# Patient Record
Sex: FEMALE | Race: WHITE | Employment: OTHER | URBAN - METROPOLITAN AREA
[De-identification: names, ages, dates, MRNs, and addresses within clinical notes are randomized per-mention and may not be internally consistent; named-entity substitution may affect disease eponyms.]

---

## 2017-08-24 ENCOUNTER — HOSPITAL ENCOUNTER (OUTPATIENT)
Dept: MAMMOGRAPHY | Age: 67
Discharge: HOME | End: 2017-08-24
Attending: FAMILY MEDICINE
Payer: MEDICARE

## 2017-08-24 DIAGNOSIS — Z78.0 POSTMENOPAUSAL: ICD-10-CM

## 2017-08-24 PROCEDURE — 77080 DXA BONE DENSITY AXIAL: CPT

## 2018-02-21 ENCOUNTER — APPOINTMENT (OUTPATIENT)
Dept: CT IMAGING | Age: 68
End: 2018-02-21
Attending: EMERGENCY MEDICINE
Payer: MEDICARE

## 2018-02-21 ENCOUNTER — HOSPITAL ENCOUNTER (EMERGENCY)
Age: 68
Discharge: HOME | End: 2018-02-21
Attending: EMERGENCY MEDICINE
Payer: MEDICARE

## 2018-02-21 VITALS
BODY MASS INDEX: 46.95 KG/M2 | RESPIRATION RATE: 18 BRPM | HEART RATE: 57 BPM | WEIGHT: 265 LBS | OXYGEN SATURATION: 93 % | TEMPERATURE: 98.5 F | SYSTOLIC BLOOD PRESSURE: 166 MMHG | HEIGHT: 63 IN | DIASTOLIC BLOOD PRESSURE: 76 MMHG

## 2018-02-21 DIAGNOSIS — R10.31 ABDOMINAL PAIN, RIGHT LOWER QUADRANT: Primary | ICD-10-CM

## 2018-02-21 LAB
ALBUMIN SERPL-MCNC: 4.1 G/DL (ref 3.2–4.6)
ALBUMIN/GLOB SERPL: 1.2 {RATIO} (ref 1.2–3.5)
ALP SERPL-CCNC: 82 U/L (ref 50–136)
ALT SERPL-CCNC: 40 U/L (ref 12–65)
ANION GAP SERPL CALC-SCNC: 8 MMOL/L (ref 7–16)
AST SERPL-CCNC: 30 U/L (ref 15–37)
BASOPHILS # BLD: 0 K/UL (ref 0–0.2)
BASOPHILS NFR BLD: 1 % (ref 0–2)
BILIRUB SERPL-MCNC: 0.6 MG/DL (ref 0.2–1.1)
BUN SERPL-MCNC: 15 MG/DL (ref 8–23)
CALCIUM SERPL-MCNC: 9.1 MG/DL (ref 8.3–10.4)
CHLORIDE SERPL-SCNC: 100 MMOL/L (ref 98–107)
CO2 SERPL-SCNC: 30 MMOL/L (ref 21–32)
CREAT SERPL-MCNC: 1.14 MG/DL (ref 0.6–1)
DIFFERENTIAL METHOD BLD: NORMAL
EOSINOPHIL # BLD: 0.2 K/UL (ref 0–0.8)
EOSINOPHIL NFR BLD: 2 % (ref 0.5–7.8)
ERYTHROCYTE [DISTWIDTH] IN BLOOD BY AUTOMATED COUNT: 12.9 % (ref 11.9–14.6)
GLOBULIN SER CALC-MCNC: 3.3 G/DL (ref 2.3–3.5)
GLUCOSE SERPL-MCNC: 113 MG/DL (ref 65–100)
HCT VFR BLD AUTO: 43.4 % (ref 35.8–46.3)
HGB BLD-MCNC: 14.6 G/DL (ref 11.7–15.4)
IMM GRANULOCYTES # BLD: 0 K/UL (ref 0–0.5)
IMM GRANULOCYTES NFR BLD AUTO: 0 % (ref 0–5)
LYMPHOCYTES # BLD: 1.6 K/UL (ref 0.5–4.6)
LYMPHOCYTES NFR BLD: 22 % (ref 13–44)
MCH RBC QN AUTO: 31 PG (ref 26.1–32.9)
MCHC RBC AUTO-ENTMCNC: 33.6 G/DL (ref 31.4–35)
MCV RBC AUTO: 92.1 FL (ref 79.6–97.8)
MONOCYTES # BLD: 0.7 K/UL (ref 0.1–1.3)
MONOCYTES NFR BLD: 10 % (ref 4–12)
NEUTS SEG # BLD: 4.8 K/UL (ref 1.7–8.2)
NEUTS SEG NFR BLD: 65 % (ref 43–78)
PLATELET # BLD AUTO: 229 K/UL (ref 150–450)
PMV BLD AUTO: 11.1 FL (ref 10.8–14.1)
POTASSIUM SERPL-SCNC: 4 MMOL/L (ref 3.5–5.1)
PROT SERPL-MCNC: 7.4 G/DL (ref 6.3–8.2)
RBC # BLD AUTO: 4.71 M/UL (ref 4.05–5.25)
SODIUM SERPL-SCNC: 138 MMOL/L (ref 136–145)
WBC # BLD AUTO: 7.3 K/UL (ref 4.3–11.1)

## 2018-02-21 PROCEDURE — 96360 HYDRATION IV INFUSION INIT: CPT | Performed by: EMERGENCY MEDICINE

## 2018-02-21 PROCEDURE — 99283 EMERGENCY DEPT VISIT LOW MDM: CPT | Performed by: EMERGENCY MEDICINE

## 2018-02-21 PROCEDURE — 74011636320 HC RX REV CODE- 636/320: Performed by: EMERGENCY MEDICINE

## 2018-02-21 PROCEDURE — 74177 CT ABD & PELVIS W/CONTRAST: CPT

## 2018-02-21 PROCEDURE — 96361 HYDRATE IV INFUSION ADD-ON: CPT | Performed by: EMERGENCY MEDICINE

## 2018-02-21 PROCEDURE — 74011000258 HC RX REV CODE- 258: Performed by: EMERGENCY MEDICINE

## 2018-02-21 PROCEDURE — 80053 COMPREHEN METABOLIC PANEL: CPT | Performed by: EMERGENCY MEDICINE

## 2018-02-21 PROCEDURE — 99284 EMERGENCY DEPT VISIT MOD MDM: CPT | Performed by: EMERGENCY MEDICINE

## 2018-02-21 PROCEDURE — 74011250636 HC RX REV CODE- 250/636: Performed by: EMERGENCY MEDICINE

## 2018-02-21 PROCEDURE — 81003 URINALYSIS AUTO W/O SCOPE: CPT | Performed by: EMERGENCY MEDICINE

## 2018-02-21 PROCEDURE — 85025 COMPLETE CBC W/AUTO DIFF WBC: CPT | Performed by: EMERGENCY MEDICINE

## 2018-02-21 RX ORDER — SODIUM CHLORIDE 0.9 % (FLUSH) 0.9 %
10 SYRINGE (ML) INJECTION
Status: COMPLETED | OUTPATIENT
Start: 2018-02-21 | End: 2018-02-21

## 2018-02-21 RX ORDER — ONDANSETRON 4 MG/1
4 TABLET, ORALLY DISINTEGRATING ORAL
Qty: 15 TAB | Refills: 0 | Status: SHIPPED | OUTPATIENT
Start: 2018-02-21 | End: 2020-08-31

## 2018-02-21 RX ADMIN — IOPAMIDOL 100 ML: 755 INJECTION, SOLUTION INTRAVENOUS at 12:51

## 2018-02-21 RX ADMIN — SODIUM CHLORIDE 500 ML: 900 INJECTION, SOLUTION INTRAVENOUS at 11:37

## 2018-02-21 RX ADMIN — SODIUM CHLORIDE 100 ML: 900 INJECTION, SOLUTION INTRAVENOUS at 12:51

## 2018-02-21 RX ADMIN — Medication 10 ML: at 12:51

## 2018-02-21 NOTE — DISCHARGE INSTRUCTIONS
Abdominal Pain: Care Instructions  Your Care Instructions    Abdominal pain has many possible causes. Some aren't serious and get better on their own in a few days. Others need more testing and treatment. If your pain continues or gets worse, you need to be rechecked and may need more tests to find out what is wrong. You may need surgery to correct the problem. Don't ignore new symptoms, such as fever, nausea and vomiting, urination problems, pain that gets worse, and dizziness. These may be signs of a more serious problem. Your doctor may have recommended a follow-up visit in the next 8 to 12 hours. If you are not getting better, you may need more tests or treatment. The doctor has checked you carefully, but problems can develop later. If you notice any problems or new symptoms, get medical treatment right away. Follow-up care is a key part of your treatment and safety. Be sure to make and go to all appointments, and call your doctor if you are having problems. It's also a good idea to know your test results and keep a list of the medicines you take. How can you care for yourself at home? · Rest until you feel better. · To prevent dehydration, drink plenty of fluids, enough so that your urine is light yellow or clear like water. Choose water and other caffeine-free clear liquids until you feel better. If you have kidney, heart, or liver disease and have to limit fluids, talk with your doctor before you increase the amount of fluids you drink. · If your stomach is upset, eat mild foods, such as rice, dry toast or crackers, bananas, and applesauce. Try eating several small meals instead of two or three large ones. · Wait until 48 hours after all symptoms have gone away before you have spicy foods, alcohol, and drinks that contain caffeine. · Do not eat foods that are high in fat. · Avoid anti-inflammatory medicines such as aspirin, ibuprofen (Advil, Motrin), and naproxen (Aleve).  These can cause stomach upset. Talk to your doctor if you take daily aspirin for another health problem. When should you call for help? Call 911 anytime you think you may need emergency care. For example, call if:  ? · You passed out (lost consciousness). ? · You pass maroon or very bloody stools. ? · You vomit blood or what looks like coffee grounds. ? · You have new, severe belly pain. ?Call your doctor now or seek immediate medical care if:  ? · Your pain gets worse, especially if it becomes focused in one area of your belly. ? · You have a new or higher fever. ? · Your stools are black and look like tar, or they have streaks of blood. ? · You have unexpected vaginal bleeding. ? · You have symptoms of a urinary tract infection. These may include:  ¨ Pain when you urinate. ¨ Urinating more often than usual.  ¨ Blood in your urine. ? · You are dizzy or lightheaded, or you feel like you may faint. ? Watch closely for changes in your health, and be sure to contact your doctor if:  ? · You are not getting better after 1 day (24 hours). Where can you learn more? Go to http://rodolfo-elio.info/. Enter T860 in the search box to learn more about \"Abdominal Pain: Care Instructions. \"  Current as of: March 20, 2017  Content Version: 11.4  © 5697-6079 [employer]. Care instructions adapted under license by [employer] (which disclaims liability or warranty for this information). If you have questions about a medical condition or this instruction, always ask your healthcare professional. [place] [address] any warranty or liability for your use of this information.

## 2018-02-21 NOTE — ED PROVIDER NOTES
HPI:  28-year-old female here with right lower quadrant abdominal pain and the right flank pain that has been ongoing for the past 3 days. No blood in urine. Initially started right flank now radiated down to the right lower quadrant. Has been more consistent and constant. Nausea without vomiting. She thinks she may be constipated. Usually regular daily bowel movement however over the past 3-4 days has only had bowel movement when taken Dulcolax and they are very small. She denies any fever. No recent antibiotic, travel. No prior history of bowel obstructions. Has had total hysterectomy in the past.  Stated the sort of feels like a menstrual cycle. ROS  Constitutional: No fever, no chills  Skin: no rash  Eye: No vision changes  ENMT: No sore throat, no congestion  Respiratory: No shortness of breath, no cough  Cardiovascular: No chest pain, no palpitations  Gastrointestinal: No vomiting, + nausea, no diarrhea, + constipation, no rectal bleeding, + abdominal pain  : No dysuria, no hematuria  MSK: No back pain, no muscle pain, no joint pain  Neuro: No headache, no change in mental status, no numbness, no tingling, no weakness    All other review of systems positive per history of present illness and the above otherwise negative or noncontributory.     Visit Vitals    /84 (BP 1 Location: Right arm, BP Patient Position: At rest)    Pulse 79    Temp 98.4 °F (36.9 °C)    Resp 18    Ht 5' 3\" (1.6 m)    Wt 120.2 kg (265 lb)    SpO2 95%    BMI 46.94 kg/m2     Past Medical History:   Diagnosis Date    Acid reflux     Allergic rhinitis, cause unspecified 2/8/2013    Arthritis     OA- hips and knees    Back pain 2/8/2013    Basal cell carcinoma of cheek 2/8/2013    Elevated blood uric acid level 2/12/2015    Hyperlipidemia     Hypertension     Malaise and fatigue 2/8/2013    Morbid obesity ([address] 9/11/13    BMI 43.3    Personal history of anxiety disorder 2/12/2015    Sleep apnea 06/01/2015  Unspecified hypothyroidism 2015     Past Surgical History:   Procedure Laterality Date    HX BREAST REDUCTION  2013    BREAST REDUCTION performed by Sony Grace MD at [facility]    ganglion cyst removed right wrist     HX SUSAN AND BSO       Prior to Admission Medications   Prescriptions Last Dose Informant Patient Reported? Taking? FLUoxetine (PROZAC) 20 mg capsule   No No   Si-2 po Qam for mood   HYDROcodone-homatropine (HYCODAN) 5-1.5 mg/5 mL (5 mL) syrup   No No   Si-2 tsp po QHS for cough up to q6h ( causes sleepiness)   OTHER   No No   Sig: Knee High compression hose or socks  (20 - 30 mmHg if possible) Wear Daily Dx:   ( vericose veins, edema - R60.9)   Omeprazole delayed release (PRILOSEC D/R) 20 mg tablet   No No   Si po Qam for reflux   Syringe with Needle, Disp, (SYRINGE 3CC/25GX1\") 3 mL 25 gauge x 1\" syrg   No No   Sig: Use Every week - monthly for B12 injection   VITAMIN D2 50,000 unit capsule   No No   Sig: TAKE ONE CAPSULE BY MOUTH EVERY SEVEN DAYS   albuterol (PROAIR HFA) 90 mcg/actuation inhaler   No No   Sig: Take 2 Puffs by inhalation every six (6) hours as needed for Wheezing. allopurinol (ZYLOPRIM) 100 mg tablet   No No   Sig: Take 1 Tab by mouth daily. atorvastatin (LIPITOR) 20 mg tablet   No No   Si by mouth daily at bedtime for cholesterol   cpap machine kit   Yes No   Sig: by Does Not Apply route. cpap 13 cm   cyanocobalamin (VITAMIN B12) 1,000 mcg/mL injection   No No   Si ml IM q week for 8 weeks then 1 ml month  Dx: pernicious anemia   furosemide (LASIX) 20 mg tablet   No No   Si-2 po qam for edema   glucosamine-chondroitin (ARTHX) 500-400 mg cap   Yes No   Sig: Take 1 Cap by mouth daily. ipratropium (ATROVENT) 0.06 % nasal spray   No No   Si Sprays by Both Nostrils route four (4) times daily. levocetirizine (XYZAL) 5 mg tablet   No No   Sig: Take 1 Tab by mouth daily.  For allergies ( instead of zyrtec)  Dx: allergic rhinitis - failed zyrtec claritin on singulair and nasal steroid   levothyroxine (SYNTHROID) 88 mcg tablet   No No   Sig: Take 1 Tab by mouth Daily (before breakfast). lisinopril-hydroCHLOROthiazide (PRINZIDE, ZESTORETIC) 20-12.5 mg per tablet   No No   Sig: Take 1 Tab by mouth daily. meloxicam (MOBIC) 15 mg tablet   No No   Si po Qd for pain and inflammation after eating  Indications: hold for surgery- last dose 13   metFORMIN ER (GLUCOPHAGE XR) 500 mg tablet   No No   Si po every day at heaviest meal increasing to 2 po every day for diabetes   metoprolol succinate (TOPROL-XL) 100 mg tablet   No No   Sig: Take 1 Tab by mouth daily. Instead of metoprolol 100 mg   mometasone (NASONEX) 50 mcg/actuation nasal spray   No No   Si Sprays by Both Nostrils route daily. montelukast (SINGULAIR) 10 mg tablet   No No   Sig: Take 1 Tab by mouth daily. For allergies with zyrtec   omeprazole (PRILOSEC) 20 mg capsule   Yes No   Sig: TAKE ONE CAPSULE BY MOUTH IN THE MORNING FOR REFLUX   potassium chloride (KLOR-CON) 10 mEq tablet   No No   Si po every day for potassium with furosemide   potassium chloride SR (KLOR-CON 10) 10 mEq tablet   Yes No   Sig: TAKE ONE TABLET BY MOUTH EVERY DAY. TAKE WITH FUROSEMIDE. Facility-Administered Medications: None         Adult Exam   General: alert, no acute distress  Head: normocephalic, atraumatic  ENT: moist mucous membranes  Neck: supple, non-tender; full range of motion  Cardiovascular: regular rate and rhythm, normal peripheral perfusion, no edema  Respiratory: lungs are clear to auscultation; normal respirations; no wheezing, rales or rhonchi  Gastrointestinal: soft, focal tenderness to right lower quadrant.; no pain at the right upper left lower, left upper quadrant.   No CVA tenderness on percussion no rebound or guarding, no peritoneal signs, no distension  Back: non-tender, full range of motion  Musculoskeletal: normal range of motion, normal strength, no gross deformities  Neurological: alert and oriented x 4, no gross focal deficits; normal speech  Psychiatric: cooperative; appropriate mood and affect    MDM:urine without signs of blood, infection. Labs unremarkable but focal tenderness consistently for 3 days. We'll obtain CT for evaluation of colitis, enteritis, inflammatory changes. I have a lower suspicion for appendicitis at this time given the overall clinical picture. Low suspicion for kidney stone. Do not suspect dissection or obstruction    Recent Results (from the past 12 hour(s))   CBC WITH AUTOMATED DIFF    Collection Time: 02/21/18 10:28 AM   Result Value Ref Range    WBC 7.3 4.3 - 11.1 K/uL    RBC 4.71 4.05 - 5.25 M/uL    HGB 14.6 11.7 - 15.4 g/dL    HCT 43.4 35.8 - 46.3 %    MCV 92.1 79.6 - 97.8 FL    MCH 31.0 26.1 - 32.9 PG    MCHC 33.6 31.4 - 35.0 g/dL    RDW 12.9 11.9 - 14.6 %    PLATELET 741 525 - 189 K/uL    MPV 11.1 10.8 - 14.1 FL    DF AUTOMATED      NEUTROPHILS 65 43 - 78 %    LYMPHOCYTES 22 13 - 44 %    MONOCYTES 10 4.0 - 12.0 %    EOSINOPHILS 2 0.5 - 7.8 %    BASOPHILS 1 0.0 - 2.0 %    IMMATURE GRANULOCYTES 0 0.0 - 5.0 %    ABS. NEUTROPHILS 4.8 1.7 - 8.2 K/UL    ABS. LYMPHOCYTES 1.6 0.5 - 4.6 K/UL    ABS. MONOCYTES 0.7 0.1 - 1.3 K/UL    ABS. EOSINOPHILS 0.2 0.0 - 0.8 K/UL    ABS. BASOPHILS 0.0 0.0 - 0.2 K/UL    ABS. IMM.  GRANS. 0.0 0.0 - 0.5 K/UL   METABOLIC PANEL, COMPREHENSIVE    Collection Time: 02/21/18 10:28 AM   Result Value Ref Range    Sodium 138 136 - 145 mmol/L    Potassium 4.0 3.5 - 5.1 mmol/L    Chloride 100 98 - 107 mmol/L    CO2 30 21 - 32 mmol/L    Anion gap 8 7 - 16 mmol/L    Glucose 113 (H) 65 - 100 mg/dL    BUN 15 8 - 23 MG/DL    Creatinine 1.14 (H) 0.6 - 1.0 MG/DL    GFR est AA >60 >60 ml/min/1.73m2    GFR est non-AA 51 (L) >60 ml/min/1.73m2    Calcium 9.1 8.3 - 10.4 MG/DL    Bilirubin, total 0.6 0.2 - 1.1 MG/DL    ALT (SGPT) 40 12 - 65 U/L    AST (SGOT) 30 15 - 37 U/L Alk. phosphatase 82 50 - 136 U/L    Protein, total 7.4 6.3 - 8.2 g/dL    Albumin 4.1 3.2 - 4.6 g/dL    Globulin 3.3 2.3 - 3.5 g/dL    A-G Ratio 1.2 1.2 - 3.5         Ct Abd Pelv W Cont    Result Date: 2/21/2018  CT of the Abdomen with Contrast and CT of the Pelvis with Contrast 2/21/2018 12:59 PM Indication: Right lower quadrant pain, nausea x3 days Comparison: None available at this hospital PACS Technique:  Multiple contiguous axial images encompassing the abdomen and pelvis are obtained following the administration of approximately 100cc of Isovue 370 nonionic intravenous contrast by power injector. No oral contrast is administered. For this CT scanner at least one of the following techniques is utilized to decrease patient radiation dose: Automatic exposure control, KVP and mA modulation based on patient weight, and iterative reconstruction. IV contrast is given to better delineate vascular structures and increase sensitivity of lesions in the solid organs. CT abdomen-mild elevation of the right hemidiaphragm. There may be hepatic steatosis but otherwise only a small cyst is suggested in the dome of the liver. Gallbladder, biliary tree and pancreas are not remarkable. Kidneys are symmetric with only some renal and parapelvic cysts suggested. Stomach, duodenum, spleen and adrenals are unremarkable. No acute vascular lesion. No enlarged adenopathy. Unopacified small bowel loops and colon are nondistended with no obvious inflammation. CT pelvis-uterus absent, no adnexal mass. Bladder appearance unremarkable. Mild prominence of fat in the left inguinal canal. Small fat-containing umbilical hernia only. Pelvic small bowel loops and the rectosigmoid colon are nondistended with no obvious inflammation. No acute vascular lesion or lymphadenopathy. IMPRESSION: Nothing acute identified in the abdomen and pelvis. Some chronic changes-possible hepatic steatosis and hepatic and renal cysts.  There has been hysterectomy. Normal size appendix in the right lower quadrant. UA negative. Nonspecific hepato-and renal cysts. No acute intra-abdominal surgical pathology identified. Patient is well-appearing at this time. Uterus and ovaries medicine secondary to total hysterectomy. Discharge home with Zofran. Recommend MiraLAX, Colace for suspected constipation. Return precautions given. No further questions or concern. Dragon voice recognition software was used to create this note. Although the note has been reviewed and corrected where necessary, additional errors may have been overlooked and remain in the text.

## 2018-02-21 NOTE — ED TRIAGE NOTES
C/o constant lower abdominal pain radiating around to bilateral flank and lower back. States intermittent episodes of worsening. Onset Monday. Denies n/v/d. Denies fever. Attempted laxative Monday and yesterday without relief. Denies hx chronic constipation however states no regular bms this weekend. Denies urinary symptoms. Denies hx kidney stones. Denies attempting pain meds.

## 2021-12-06 PROBLEM — E11.9 TYPE 2 DIABETES MELLITUS (HCC): Status: ACTIVE | Noted: 2021-12-06

## 2022-01-06 PROBLEM — N39.41 URGE INCONTINENCE OF URINE: Status: ACTIVE | Noted: 2022-01-06

## 2022-01-06 PROBLEM — N39.3 SUI (STRESS URINARY INCONTINENCE, FEMALE): Status: ACTIVE | Noted: 2022-01-06

## 2022-03-18 PROBLEM — N39.3 SUI (STRESS URINARY INCONTINENCE, FEMALE): Status: ACTIVE | Noted: 2022-01-06

## 2022-03-18 PROBLEM — E11.9 TYPE 2 DIABETES MELLITUS (HCC): Status: ACTIVE | Noted: 2021-12-06

## 2022-03-19 PROBLEM — N39.41 URGE INCONTINENCE OF URINE: Status: ACTIVE | Noted: 2022-01-06

## 2022-03-30 ENCOUNTER — HOSPITAL ENCOUNTER (OUTPATIENT)
Dept: PHYSICAL THERAPY | Age: 72
Discharge: HOME | End: 2022-03-30
Attending: OBSTETRICS & GYNECOLOGY
Payer: MEDICARE

## 2022-03-30 DIAGNOSIS — E66.01 MORBID OBESITY WITH BMI OF 45.0-49.9, ADULT (HCC): ICD-10-CM

## 2022-03-30 DIAGNOSIS — N39.41 URGE INCONTINENCE OF URINE: ICD-10-CM

## 2022-03-30 DIAGNOSIS — N39.3 SUI (STRESS URINARY INCONTINENCE, FEMALE): ICD-10-CM

## 2022-03-30 PROCEDURE — 97110 THERAPEUTIC EXERCISES: CPT

## 2022-03-30 PROCEDURE — 97161 PT EVAL LOW COMPLEX 20 MIN: CPT
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
## 2022-03-30 NOTE — PROGRESS NOTES
Alexi Lutz  : 1950  [place]  Secondary: [address] [facility] [address], [place] [address]  [phone]   [phone]      OUTPATIENT PHYSICAL THERAPY: Daily Treatment Note 3/30/2022   Visit Count:  1  ICD-10: Treatment Diagnosis: Lack of coordination (muscle incoordination) (R27.8), Generalized weakness (M62.81), Pelvic Muscle Wasting (N81.84), Mixed incontinence (Urge and stress incontinence) (N39.46) and Nocturia (R35.1)  Precautions/Allergies:   Patient has no known allergies. TREATMENT PLAN:  Effective Dates: 3/30/2022 TO 2022 (90 days). Frequency/Duration: 1 time a week for 90 Day(s) MEDICAL/REFERRING DIAGNOSIS:  Urge incontinence of urine [N39.41]  PERCY (stress urinary incontinence, female) [N39.3]  Morbid obesity with BMI of 45.0-49.9, adult ([facility] [E66.01, Z68.42]   DATE OF ONSET: chronic   REFERRING PHYSICIAN: Kwame Cisneros DO MD Orders: Evaluate and treat  Return MD Appointment: not scheduled; f/u after PT     Pre-treatment Symptoms/Complaints:  PERCY, UUI  Pain: Initial: Pain Intensity 1: 0  Post Session:  0/10   Medications Last Reviewed:  3/30/2022  Updated Objective Findings:  See evaluation note from today   TREATMENT:   THERAPEUTIC EXERCISE: (10 minutes):  Exercises per grid below to improve mobility, strength and coordination. Required maximal visual, verbal and tactile cues to promote proper body breathing techniques and activation of PFM. Progressed range and repetitions as indicated.   TE= therapeutic exercise, NE= neuro re-education   Date:  3/30/22 Date:   Date:     Activity/Exercise Parameters Parameters Parameters   PFM coordination Isolation of contraction      HEP kegel x10; 3x/day             THERAPEUTIC ACTIVITY: ( 10 minutes): Functional activity education regarding anatomy, pathology and role of pelvic floor muscle (PFM) function in relation to presenting symptoms and role of pelvic floor therapy in conservative treatment. TA Educational Topic Notes Date Completed   Pathology/Anatomy/PFM Function  3/30/22   Bladder health education     Urinary urge suppression     The knack     Voiding strategies     Bowel/Bladder log     Bowel health education     Constipation care     Diarrhea/Fecal leakage     Colonic massage     Toilet positioning     Defecation dynamics     Sources of fiber     Return to intercourse/Dilator training     Sexual positioning     Lubricants/vaginal moisturizers     Vaginal irritants     Body mechanics     Posture/ergonomics     Diaphragmatic breathing     Resources and technology       Pt gives verbal consent to internal vaginal assessment/treatment without chaperon present. Treatment/Session Summary:    Response to Treatment:  Pt reports good understanding of plan of care, as well as prescribed home exercise program.  All questions were answered to pt's satisfaction. Pt was invited to call with any further questions or concerns.   Communication/Consultation:  None today  Equipment provided today:  None today  Recommendations/Intent for next treatment session: Next visit will focus on progression of kegel, functional use of breath, bladder health, Healthy Lifestyles Program.  Variation from POC: N/A  Total Treatment Billable Duration: Evaluation 35 minutes, treatment 20 minutes (10 minutes TE, 10 minutes TA)  PT Patient Time In/Time Out  Time In: 1400  Time Out: [address] [facility] [facility] [address], PT, DPT     Future Appointments   Date Time Provider Ryan Hutchinson   4/6/2022  3:00 PM Chandrakant Rosenberg PT DPT [facility]   4/13/2022  1:00 PM Korina Dorado PT, DPT [facility]   4/20/2022  1:00 PM Korina Dorado PT, DPT [facility]   4/27/2022 11:00 AM Korina Dorado PT, DPT [facility]   5/11/2022  1:00 PM Chandrakant Rosenberg PT, DPT [facility]   5/25/2022  2:00 PM Chandrakant Rosenberg PT, DPT [facility]   6/8/2022  1:15 PM Nix, Meghan Up, PT, DPT [facility]   6/20/2022  1:00 PM Lena Garcia PT, DPT [facility]   9/6/2022  9:30 AM Phoebe Adam MD [facility] FPA FPA   11/8/2022  9:20 AM Clarice Madrid MD [facility] PSCD PP

## 2022-04-06 ENCOUNTER — HOSPITAL ENCOUNTER (OUTPATIENT)
Dept: PHYSICAL THERAPY | Age: 72
Discharge: HOME | End: 2022-04-06
Attending: OBSTETRICS & GYNECOLOGY
Payer: MEDICARE

## 2022-04-06 PROCEDURE — 97530 THERAPEUTIC ACTIVITIES: CPT

## 2022-04-06 PROCEDURE — 97110 THERAPEUTIC EXERCISES: CPT

## 2022-04-06 NOTE — PROGRESS NOTES
Edilma Credit  : 1950  [place]  Secondary: [address] at [facility]  [address], [address], [place]  [phone]   [phone]      OUTPATIENT PHYSICAL THERAPY: Daily Treatment Note 2022   Visit Count:  2  ICD-10: Treatment Diagnosis: Lack of coordination (muscle incoordination) (R27.8), Generalized weakness (M62.81), Pelvic Muscle Wasting (N81.84), Mixed incontinence (Urge and stress incontinence) (N39.46) and Nocturia (R35.1)  Precautions/Allergies:   Patient has no known allergies. TREATMENT PLAN:  Effective Dates: 3/30/2022 TO 2022 (90 days). Frequency/Duration: 1 time a week for 90 Day(s) MEDICAL/REFERRING DIAGNOSIS:  Urge incontinence of urine [N39.41]  PERCY (stress urinary incontinence, female) [N39.3]  Morbid obesity with BMI of 45.0-49.9, adult ([facility] [E66.01, Z68.42]   DATE OF ONSET: chronic   REFERRING PHYSICIAN: Lorene Vargas DO MD Orders: Evaluate and treat  Return MD Appointment: not scheduled; f/u after PT     Pre-treatment Symptoms/Complaints:  PERCY, UUI    Pt reports doing well with exercises. She is doing about 5 sets of kegels per day. Still challenged with isolation of contraction. Nothing new to report. Pain: Initial: Pain Intensity 1: 0  Post Session:  0/10   Medications Last Reviewed:  2022  Updated Objective Findings:  unable to activation PFM with bulging present with increased in IAP   TREATMENT:   THERAPEUTIC EXERCISE: (30 minutes):  Exercises per grid below to improve mobility, strength and coordination. Required maximal visual, verbal and tactile cues to promote proper body breathing techniques and activation of PFM. Progressed range and repetitions as indicated.   TE= therapeutic exercise, NE= neuro re-education   Date:  3/30/22 Date:  22 Date:     Activity/Exercise Parameters Parameters Parameters   PFM coordination Isolation of contraction Isolation of contraction w/ digital palpation; max verbal, tactile cuing to improve activation and minimize bulging  Breathing strategies to improve activation    HEP kegel x10; 3x/day Continue with isolation of PFM            THERAPEUTIC ACTIVITY: ( 25 minutes): Functional activity education on avoiding bladder irritants, substitutions for common irritants, recommended fluid intake (types and spacing), appropriate voiding frequency, weight management and overall contributing factors of bowel health on bladder health/function in order to improve independent self management. Patient was provided a list of common bladder irritants including caffeine, carbonation, alcohol, artificial sweeteners, chocolate, acidic drinks, and tomato based drinks. and Extensive functional activity training on avoiding valsava/breath holding during strenuous activities as well as abdominal/pelvic bracing to provide muscular support and decrease symptoms of PERCY during lifting, sit to stand, getting in/out of car as well as log roll technique to decrease intraabdominal pressure. TA Educational Topic Notes Date Completed   Pathology/Anatomy/PFM Function  3/30/22   Bladder health education Timed voiding every 2-4 hours, fluid management 4/6/22   Urinary urge suppression     The william     Voiding strategies     Bowel/Bladder log     Bowel health education     Constipation care     Diarrhea/Fecal leakage     Colonic massage     Toilet positioning     Defecation dynamics     Sources of fiber     Return to intercourse/Dilator training     Sexual positioning     Lubricants/vaginal moisturizers     Vaginal irritants     Body mechanics Sit to stand w/ IAP management 4/6/22   Posture/ergonomics     Diaphragmatic breathing     Resources and technology       Pt gives verbal consent to internal vaginal assessment/treatment without chaperon present.     Treatment/Session Summary:    Response to Treatment:  Pt with significant difficult coordination activation of PFM. Noted bulging with increase oblique and IAP increase. Difficulty with awareness and activation of TA, often resulting in oblique activation and IAP. Pt educated extensive on bladder health education in order to improve understanding and awareness of bladder norms. Encouraged pt to utilized timed voiding every 2-4 hours in order to avoid overflow incontinence normal bladder sensations/urges. Discussed tips for nocturia management including LE swelling management that might contribute to increased urine production at night time. Practice functional use of breathing during sit to  order minimize IAP management contributing to PERCY.   Communication/Consultation:  None today  Equipment provided today:  None today  Recommendations/Intent for next treatment session: Next visit will focus on progression of kegel, functional use of breath, hip strengthening w/ focus on breathing mechanics to improve IAP management  Variation from POC: N/A  Total Treatment Billable Duration: 30 minutes TE, 25 minutes TA  PT Patient Time In/Time Out  Time In: 1400  Time Out: 1500  Sherman Cordon, PT, DPT     Future Appointments   Date Time Provider Ryan Hutchinson   4/13/2022  1:00 PM Gaston Gregorio PT, DPT [facility]   4/20/2022  1:00 PM Yeni Dorado, PT, DPT [facility]   4/27/2022 11:00 AM Yeni Dorado PT, DPT [facility]   5/11/2022  1:00 PM Gaston Gregorio PT, DPT [facility]   5/25/2022  2:00 PM Gaston Gregorio PT, DPT [facility]   6/8/2022  1:15 PM Gaston Gregorio, PT, DPT [facility]   6/20/2022  1:00 PM Gaston Gregorio, PT, DPT [facility]   9/6/2022  9:30 AM Mitesh Godoy MD [facility] FPA   11/8/2022  9:20 AM Nicolás Teran MD [facility] PP

## 2022-04-13 ENCOUNTER — HOSPITAL ENCOUNTER (OUTPATIENT)
Dept: PHYSICAL THERAPY | Age: 72
Discharge: HOME | End: 2022-04-13
Attending: OBSTETRICS & GYNECOLOGY
Payer: MEDICARE

## 2022-04-13 PROCEDURE — 97140 MANUAL THERAPY 1/> REGIONS: CPT

## 2022-04-13 PROCEDURE — 97530 THERAPEUTIC ACTIVITIES: CPT

## 2022-04-13 PROCEDURE — 97110 THERAPEUTIC EXERCISES: CPT

## 2022-04-13 NOTE — PROGRESS NOTES
Geo Kaiser  : 1950  [place]  Secondary: [address] at [facility]  [address], [address], [place] [address]  [phone]   [phone]      OUTPATIENT PHYSICAL THERAPY: Daily Treatment Note 2022   Visit Count:  3  ICD-10: Treatment Diagnosis: Lack of coordination (muscle incoordination) (R27.8), Generalized weakness (M62.81), Pelvic Muscle Wasting (N81.84), Mixed incontinence (Urge and stress incontinence) (N39.46) and Nocturia (R35.1)  Precautions/Allergies:   Patient has no known allergies. TREATMENT PLAN:  Effective Dates: 3/30/2022 TO 2022 (90 days). Frequency/Duration: 1 time a week for 90 Day(s) MEDICAL/REFERRING DIAGNOSIS:  Urge incontinence of urine [N39.41]  PERCY (stress urinary incontinence, female) [N39.3]  Morbid obesity with BMI of 45.0-49.9, adult ([facility] [E66.01, Z68.42]   DATE OF ONSET: chronic   REFERRING PHYSICIAN: Hiren Cook DO MD Orders: Evaluate and treat  Return MD Appointment: not scheduled; f/u after PT     Pre-treatment Symptoms/Complaints:  PERCY, UUI    Has continued with kegels. She is feeling a squeeze in the vaginal area when she is doing her exercises. Completing in supine and reclined. Moved up medication (lasix) time at night to about 6p. She is only waking up only about 1x/night. Pain: Initial: Pain Intensity 1: 0  Post Session:  0/10   Medications Last Reviewed:  2022  Updated Objective Findings:  decreased accessory mm use in antigravity positioning   TREATMENT:   THERAPEUTIC EXERCISE: (30 minutes):  Exercises per grid below to improve mobility, strength and coordination. Required maximal visual, verbal and tactile cues to promote proper body breathing techniques and activation of PFM. Progressed range and repetitions as indicated.   TE= therapeutic exercise, NE= neuro re-education   Date:  3/30/22 Date:  22 Date:  22   Activity/Exercise Parameters Parameters Parameters   PFM coordination Isolation of contraction  Isolation of contraction w/ digital palpation; max verbal, tactile cuing to improve activation and minimize bulging  Breathing strategies to improve activation - Isolation of contraction w/ digital palpation; max verbal, tactile cuing to improve activation and minimize bulging  - Breathing strategies to improve activation  - kegels in antigravity positioning   HEP kegel x10; 3x/day Continue with isolation of PFM kegels in antigravity positioning 3x/day; urge suppression           THERAPEUTIC ACTIVITY: ( 15 minutes): Functional activity training on role and use of urge suppression in order to delay voiding, minimize urge urinary incontinence and improve control in the presence of urge triggers (ie. running water, key in lock, cold, etc.)  TA Educational Topic Notes Date Completed   Pathology/Anatomy/PFM Function  3/30/22   Bladder health education Timed voiding every 2-4 hours, fluid management 4/6/22   Urinary urge suppression  4/13/22   The william     Voiding strategies     Bowel/Bladder log     Bowel health education     Constipation care     Diarrhea/Fecal leakage     Colonic massage     Toilet positioning     Defecation dynamics     Sources of fiber     Return to intercourse/Dilator training     Sexual positioning     Lubricants/vaginal moisturizers     Vaginal irritants     Body mechanics Sit to stand w/ IAP management 4/6/22   Posture/ergonomics     Diaphragmatic breathing     Resources and technology       MANUAL THERAPY: (10 minutes): Soft tissue mobilization was utilized and necessary because of the patient's restricted motion of soft tissue.     Date Type Location Comments   4/13/2022 Internal assessment/treatment Via vaginal canal Single digit MT to levator ani to improve STM                                       (Used abbreviations: MET - muscle energy technique; SCS- Strain counter strain; CTM-Connective tissue mobilizations; CR- Contract/Relax; SP- Sustained pressure; PIT- Positional inhibition techniques; STM Soft -tissue mobilization; MM- Myofascial mobilization; TrP-Trigger point release; IASTM- Instrument assisted soft tissue mobilizations, TDN-Trigger point dry needling)    Pt gives verbal consent to internal vaginal assessment/treatment without chaperon present. Treatment/Session Summary:    Response to Treatment: Continues to have significant accessory mm use result in increased IAP. There is reduced accessory mm use with pt in anti gravity position. Will complete kegel program and this position over the next week in order to improve strength/endurance of PFM. Pt educated extensively today on role and use of urge suppression in order to improve urge control and minimize UUI. Pt verbalizes good understanding and is encouraged to practice daily and implement as needed in presence of urge triggers and urinary urgency. Communication/Consultation:  None today  Equipment provided today:  None today  Recommendations/Intent for next treatment session: Next visit will focus on progression of kegel, overflow activation, functional integration to improve IAP management- log roll, lifting.   Variation from POC: N/A  Total Treatment Billable Duration: 30 minutes TE, 15 minutes TA, 10 minutes MT  PT Patient Time In/Time Out  Time In: 1300  Time Out: 503 [address] Nix, PT, DPT     Future Appointments   Date Time Provider Ryan Monacoi   4/20/2022  1:00 PM Dixie Castillo PT, DPT [facility]   4/27/2022 11:00 AM [facility]   5/11/2022  1:00 PM [facility]   5/25/2022  2:00 PM Dixie Castillo PT, DPT [facility]   6/8/2022  1:15 PM [facility]illo [facility]   6/20/2022  1:00 PM Dxiie Castillo, PT, DPT [facility]   9/6/2022  9:30 AM Elier Sun MD [facility] FPA FPA   11/8/2022  9:20 AM MD MOIRA Dunbar PSCD PP

## 2022-04-20 ENCOUNTER — HOSPITAL ENCOUNTER (OUTPATIENT)
Dept: PHYSICAL THERAPY | Age: 72
Discharge: HOME | End: 2022-04-20
Attending: OBSTETRICS & GYNECOLOGY

## 2022-04-20 NOTE — PROGRESS NOTES
Jordana Bonds  : 1950  Primary:   Secondary:  [address] at [address]  [address], [address], [place]  [phone]   [phone]        OUTPATIENT DAILY NOTE    NAME/AGE/GENDER: Jordana Bonds is a 73 y.o. female. DATE: 2022    Ms. Sedrick Alfred for today's appointment due to out of town.     Ira Etienne, PT, DPT

## 2022-04-27 ENCOUNTER — HOSPITAL ENCOUNTER (OUTPATIENT)
Dept: PHYSICAL THERAPY | Age: 72
Discharge: HOME | End: 2022-04-27
Attending: OBSTETRICS & GYNECOLOGY
Payer: MEDICARE

## 2022-04-27 PROCEDURE — 97110 THERAPEUTIC EXERCISES: CPT

## 2022-04-27 NOTE — PROGRESS NOTES
Trenton Hines  : 1950  [place]  Secondary: [address] at [facility]  [address], [address], [place] [address]  [phone]   [phone]      OUTPATIENT PHYSICAL THERAPY: Daily Treatment Note 2022   Visit Count:  4  ICD-10: Treatment Diagnosis: Lack of coordination (muscle incoordination) (R27.8), Generalized weakness (M62.81), Pelvic Muscle Wasting (N81.84), Mixed incontinence (Urge and stress incontinence) (N39.46) and Nocturia (R35.1)  Precautions/Allergies:   Patient has no known allergies. TREATMENT PLAN:  Effective Dates: 3/30/2022 TO 2022 (90 days). Frequency/Duration: 1 time a week for 90 Day(s) MEDICAL/REFERRING DIAGNOSIS:  Urge incontinence of urine [N39.41]  PERCY (stress urinary incontinence, female) [N39.3]  Morbid obesity with BMI of 45.0-49.9, adult ([facility] [E66.01, Z68.42]   DATE OF ONSET: chronic   REFERRING PHYSICIAN: Daniel Blackmon DO MD Orders: Evaluate and treat  Return MD Appointment: not scheduled; f/u after PT     Pre-treatment Symptoms/Complaints:  PERCY, UUI    Pt states that did a lot of walking while away on her cruise. She did not get to do her exercises as much. She did well overall while on the cruise. She did not have any urinary accidents. She has had good success with using urge suppression. Pain: Initial: Pain Intensity 1: 0  Post Session:  0/10   Medications Last Reviewed:  2022  Updated Objective Findings:  decreased accessory mm use in antigravity positioning   TREATMENT:   THERAPEUTIC EXERCISE: (53 minutes):  Exercises per grid below to improve mobility, strength and coordination. Required maximal visual, verbal and tactile cues to promote proper body breathing techniques and activation of PFM. Progressed resistance, range, repetitions and complexity of movement as indicated.  All exercises performed with emphasis on kegel and breathing in order improve coordination, awareness and to minimize symptoms.      Date:  3/30/22 Date:  4/6/22 Date:  4/13/22 Date:  4/27/22   Activity/Exercise Parameters Parameters Parameters    PFM coordination Isolation of contraction  Isolation of contraction w/ digital palpation; max verbal, tactile cuing to improve activation and minimize bulging  Breathing strategies to improve activation - Isolation of contraction w/ digital palpation; max verbal, tactile cuing to improve activation and minimize bulging  - Breathing strategies to improve activation  - kegels in antigravity positioning Coordination of kegel w/ digital palpation; exhale w/ contraction to improve recruitment and minimize IAP   HEP kegel x10; 3x/day Continue with isolation of PFM kegels in antigravity positioning 3x/day; urge suppression    Stepper    8 minutes    Hip abduction    x30   Hip adduction    x30   Bridge     2x10   HEP    Continue kegel program and emphasis on exhale w/ contraction; hip add, hip abd and bridge 1x/day            THERAPEUTIC ACTIVITY: ( 0 minutes): Functional activity training on role and use of urge suppression in order to delay voiding, minimize urge urinary incontinence and improve control in the presence of urge triggers (ie. running water, key in lock, cold, etc.)  TA Educational Topic Notes Date Completed   Pathology/Anatomy/PFM Function  3/30/22   Bladder health education Timed voiding every 2-4 hours, fluid management 4/6/22   Urinary urge suppression  4/13/22   The william     Voiding strategies     Bowel/Bladder log     Bowel health education     Constipation care     Diarrhea/Fecal leakage     Colonic massage     Toilet positioning     Defecation dynamics     Sources of fiber     Return to intercourse/Dilator training     Sexual positioning     Lubricants/vaginal moisturizers     Vaginal irritants     Body mechanics Sit to stand w/ IAP management 4/6/22   Posture/ergonomics     Diaphragmatic breathing     Resources and technology MANUAL THERAPY: (0 minutes): Soft tissue mobilization was utilized and necessary because of the patient's restricted motion of soft tissue. Date Type Location Comments   4/27/2022 Internal assessment/treatment Via vaginal canal Single digit MT to levator ani to improve STM                                       (Used abbreviations: MET - muscle energy technique; SCS- Strain counter strain; CTM-Connective tissue mobilizations; CR- Contract/Relax; SP- Sustained pressure; PIT- Positional inhibition techniques; STM Soft -tissue mobilization; MM- Myofascial mobilization; TrP-Trigger point release; IASTM- Instrument assisted soft tissue mobilizations, TDN-Trigger point dry needling)    Pt gives verbal consent to internal vaginal assessment/treatment without chaperon present. Treatment/Session Summary:    Response to Treatment:  Continues to be challenged with IAP management and stability of kegel contraction. This is improved today with emphasis on gentle exhale. Kegel and breathing incorporated today with basic mat exercises to challenge coordiantion and awareness. Pt did well with this with improved coordination with practice. Pt to continue with HEP as prescribed to improve strength, coordination and awareness w/ addition of mat exercises. Communication/Consultation:  None today  Equipment provided today:  None today  Recommendations/Intent for next treatment session: Next visit will focus on progression of kegel, overflow activation, functional integration to improve IAP management- log roll, lifting.   Variation from POC: N/A  Total Treatment Billable Duration: 53 minutes TE  PT Patient Time In/Time Out  Time In: 1100  Time Out: 600 [facility], PT, DPT     Future Appointments   Date Time Provider Ryan Hutchinson   5/11/2022  1:00 PM Cari Taylor, PT, DPT [facility]   5/25/2022  2:00 PM Cari Taylor, PT, DPT [facility]   6/8/2022  1:15 PM Chana Dorado, PT, DPT [facility] 6/20/2022  1:00 PM Nix, Danielito Elliott, PT, DPT [facility]   9/6/2022  9:30 AM Mateusz Schumacher MD SSA FPA FPA   11/8/2022  9:20 AM Lizzy Lazaro MD SSA PSCD PP

## 2022-05-02 ENCOUNTER — HOSPITAL ENCOUNTER (OUTPATIENT)
Dept: PHYSICAL THERAPY | Age: 72
Setting detail: RECURRING SERIES
Discharge: HOME | End: 2022-05-05
Payer: MEDICARE

## 2022-05-03 ENCOUNTER — APPOINTMENT (OUTPATIENT)
Dept: PHYSICAL THERAPY | Age: 72
End: 2022-05-03
Payer: MEDICARE

## 2022-05-11 ENCOUNTER — HOSPITAL ENCOUNTER (OUTPATIENT)
Dept: PHYSICAL THERAPY | Age: 72
Discharge: HOME | End: 2022-05-11
Attending: OBSTETRICS & GYNECOLOGY
Payer: MEDICARE

## 2022-05-11 PROCEDURE — 9990 CHARGE CONVERSION

## 2022-05-11 PROCEDURE — 97110 THERAPEUTIC EXERCISES: CPT

## 2022-05-11 NOTE — PROGRESS NOTES
Iraida Ramsey  : 1950  [place]  Secondary: [address] at [facility]  [address], [address], [place]  [phone]   [phone]      OUTPATIENT PHYSICAL THERAPY: Daily Treatment Note 2022   Visit Count:  5  ICD-10: Treatment Diagnosis: Lack of coordination (muscle incoordination) (R27.8), Generalized weakness (M62.81), Pelvic Muscle Wasting (N81.84), Mixed incontinence (Urge and stress incontinence) (N39.46) and Nocturia (R35.1)  Precautions/Allergies:   Patient has no known allergies. TREATMENT PLAN:  Effective Dates: 3/30/2022 TO 2022 (90 days). Frequency/Duration: 1 time a week for 90 Day(s) MEDICAL/REFERRING DIAGNOSIS:  Urge incontinence of urine [N39.41]  PERCY (stress urinary incontinence, female) [N39.3]  Morbid obesity with BMI of 45.0-49.9, adult ([facility] [E66.01, Z68.42]   DATE OF ONSET: chronic   REFERRING PHYSICIAN: Wilda Lopez DO MD Orders: Evaluate and treat  Return MD Appointment: not scheduled; f/u after PT     Pre-treatment Symptoms/Complaints:  PERCY, UUI    Doing well. Denies accidents the last two weeks. Doing well with exercises; still finds coordination to be challenging, however getting easier. Pain: Initial: Pain Intensity 1: 0  Post Session:  0/10   Medications Last Reviewed:  2022  Updated Objective Findings:  None Today   TREATMENT:   THERAPEUTIC EXERCISE: (50 minutes):  Exercises per grid below to improve mobility, strength and coordination. Required moderate visual, verbal and tactile cues to promote proper body alignment, promote proper body posture, promote proper body breathing techniques and activation of PFM. Progressed resistance, range, repetitions and complexity of movement as indicated. All exercises performed with emphasis on kegel and breathing in order improve coordination, awareness and to minimize symptoms.      Date:  3/30/22 Date:  22 Date:  4/13/22 Date:  4/27/22 Date:  5/11/22   Activity/Exercise Parameters Parameters Parameters     PFM coordination Isolation of contraction  Isolation of contraction w/ digital palpation; max verbal, tactile cuing to improve activation and minimize bulging  Breathing strategies to improve activation - Isolation of contraction w/ digital palpation; max verbal, tactile cuing to improve activation and minimize bulging  - Breathing strategies to improve activation  - kegels in antigravity positioning Coordination of kegel w/ digital palpation; exhale w/ contraction to improve recruitment and minimize IAP    HEP kegel x10; 3x/day Continue with isolation of PFM kegels in antigravity positioning 3x/day; urge suppression     Stepper    8 minutes  8 minutes   Hip abduction    x30 x15, lime   Hip adduction    x30 x15   Bridge     2x10 x10  x10 w/ hip add  x10 w/ hip abd, lime   HEP    Continue kegel program and emphasis on exhale w/ contraction; hip add, hip abd and bridge 1x/day Continue with kegel program 2x/day; strengthening 2x/day, choose 3-4 exercises each time   Sit to stand     2x10   Sidestepping     4x10ft   Overhead press     x10 6# seated   Chest press     x10 6# seated   Row     X15, blue seated     THERAPEUTIC ACTIVITY: ( 0 minutes): Functional activity training on role and use of urge suppression in order to delay voiding, minimize urge urinary incontinence and improve control in the presence of urge triggers (ie. running water, key in lock, cold, etc.)  TA Educational Topic Notes Date Completed   Pathology/Anatomy/PFM Function  3/30/22   Bladder health education Timed voiding every 2-4 hours, fluid management 4/6/22   Urinary urge suppression  4/13/22   The william     Voiding strategies     Bowel/Bladder log     Bowel health education     Constipation care     Diarrhea/Fecal leakage     Colonic massage     Toilet positioning     Defecation dynamics     Sources of fiber     Return to intercourse/Dilator training     Sexual positioning     Lubricants/vaginal moisturizers     Vaginal irritants     Body mechanics Sit to stand w/ IAP management 4/6/22   Posture/ergonomics     Diaphragmatic breathing     Resources and technology       MANUAL THERAPY: (0 minutes): Soft tissue mobilization was utilized and necessary because of the patient's restricted motion of soft tissue. Date Type Location Comments   5/11/2022 Internal assessment/treatment Via vaginal canal Single digit MT to levator ani to improve STM                                       (Used abbreviations: MET - muscle energy technique; SCS- Strain counter strain; CTM-Connective tissue mobilizations; CR- Contract/Relax; SP- Sustained pressure; PIT- Positional inhibition techniques; STM Soft -tissue mobilization; MM- Myofascial mobilization; TrP-Trigger point release; IASTM- Instrument assisted soft tissue mobilizations, TDN-Trigger point dry needling)    Pt gives verbal consent to internal vaginal assessment/treatment without chaperon present. (Not today)    Treatment/Session Summary:    Response to Treatment: Pt tolerated progression of exercise activities well today without increased symptoms. Progressed positioning with exercises to including seated and standing in order to challenge strength and coordination. Communication/Consultation:  None today  Equipment provided today:  None today  Recommendations/Intent for next treatment session: Next visit will focus on progression of kegel, overflow activation, functional integration to improve IAP management- log roll, lifting.   Variation from POC: N/A  Total Treatment Billable Duration: 50 minutes TE  PT Patient Time In/Time Out  Time In: 1300  Time Out: 3828 Titus Toledo PT, DPT     Future Appointments   Date Time Provider Ryan Hutchinson   5/25/2022  2:00 PM Efren Milton DPT [facility]   6/8/2022  1:15 PM David Nuñez PT, DPT [facility]   6/20/2022  1:00 PM Natalie Dorado Speaker, PT, DPT [facility]

## 2022-05-23 DIAGNOSIS — E78.5 HYPERLIPIDEMIA, UNSPECIFIED: ICD-10-CM

## 2022-05-24 NOTE — PROGRESS NOTES
Felipe Rom  : 1950  Primary: Medicare Part A And B  Secondary: [employer]  [address] [facility] [address]  Phone: [phone]  Fax: [phone] Plan Frequency: 1 time a week for 90 days    Plan of Care/Certification Expiration Date: 22      PT Visit Info:    No data recorded    OUTPATIENT PHYSICAL THERAPY:OP NOTE TYPE: Treatment Note 2022       Episode   Appt Desk       Treatment Diagnosis:  Lack of coordination (muscle incoordination) (R27.8)  Generalized weakness (M62.81)  Pelvic Muscle Wasting (N81.84)  Mixed incontinence (Urge and stress incontinence) (N39.46)  Nocturia (R35.1)  Medical/Referring Diagnosis:  Urge incontinence [N39.41]  Stress incontinence (female) (male) [N39.3]  Referring Physician:  Tawnya Ashley DO MD Orders:  PT Eval and Treat   Return MD appointment:  Not scheduled  Date of Onset:  Chronic  Allergies:  Patient has no known allergies. Restrictions/Precautions:    Restrictions/Precautions: None  No data recorded   Interventions Planned (Treatment may consist of any combination of the following):    Current Treatment Recommendations: Therapeutic activities; Patient/Caregiver education & training; Home exercise program; Manual Therapy - Soft Tissue Mobilization; Neuromuscular re-education; Endurance training; Strengthening; ROM; Functional mobility training; ADL/Self-care training     Subjective Comments:   Doing \"pretty good with exercises, sometimes I forget\". Denies urinary leakage/incontinence episodes. Still wearing a brief when out of the house \"just in case\", but has not needed it.    Daytime frequency about 4-5x/day, night time 1-2x/night     Initial:     0/10 Post Session:     0/10  Medications Last Reviewed:  2022  Updated Objective Findings:  See below   Pelvic Floor Internal Exam:  Type of exam Performed: Internal Vaginal exam  Sensation: Intact  Palpation: non-tender w/ palpation via vaginal canal  Vaginal Vault Size: WNL  Tone: Vaginal,Normal  Power: 1/5 (w/ coordinated exhale, otherwise unable to elicit PFM contraction)  Endurance: 3  Co-contraction: Weak  Treatment   THERAPEUTIC EXERCISE: (40 minutes): Exercises per grid below to improve mobility, strength and coordination. Required moderate visual, verbal and tactile cues to promote proper body alignment, promote proper body posture and promote proper body breathing techniques. Progressed resistance, range, repetitions and complexity of movement as indicated. Date:  5/24/22 Date:   Date:     Activity/Exercise Parameters Parameters Parameters   PFM coordination PFM coordination and strength w/ digital palpation- 10 minutes     Stepper 8 minutes L2     Sidestepping  8x10ft, lime     Overhead press      Step up- anterior  x10 B     Step up- lateral x10 B     Mini squat to heel raise x15       THERAPEUTIC ACTIVITY: ( 15 minutes): Instruction on functional pelvic brace exercise including feedforward activation of pelvic floor muscles on exhale prior to activity that increases intra-abdominal pressure (IAP) such as cough, sneeze, laugh, sit to stand, lifting object to decrease pressure on pelvic organs and muscles during exertional activities to minimize AN. and Extensive functional activity training on avoiding valsava/breath holding during strenuous activities as well as abdominal/pelvic bracing to provide muscular support and decrease symptoms of AN during lifting, sit to stand, getting in/out of car as well as log roll technique to decrease intraabdominal pressure.     TA Educational Topic Notes Date Completed   Pathology/Anatomy/PFM Function     Bladder health education     Urinary urge suppression     The knack     Voiding strategies     Nocturia tips     Bowel/Bladder log     Bowel health education     Constipation care     Diarrhea/Fecal leakage     Colonic massage     Toilet positioning     Defecation dynamics     Sources of fiber Return to intercourse/Dilator training     Sexual positioning     Lubricants/vaginal moisturizers     Vaginal irritants     Body mechanics Log roll w/ bed mobility 5/25/22   Posture/ergonomics Lifting mechanics to decreased IAP; lift & carry w/ 11#/21# 5/25/22   Diaphragmatic breathing     Resources and technology         Treatment/Session Summary:    · Treatment Assessment: Pt continues to have minimal voluntary contraction, with best palpable contraction elicited with coordinated exhale. Continues to progress well with improving symptoms despite significant changes in objective measures. Exercises progressed today with functional integration during bed mobility and lifting activities. No symptoms reported during session. Pt progressing well toward goals at this time. · Communication/Consultation:  None today  · Equipment provided today:  None  · Recommendations/Intent for next treatment session: Next visit will focus on progression of kegel, overflow activation, functional integration to improve IAP management.     Total Treatment Billable Duration:  40 minutes TE, 15 minutes TA  Time In: 4877  Time Out: [facility], PT     Future Appointments   Date Time Provider Nickolas Gates   6/8/2022  1:00 PM Julio Navarro, PT [facility]   6/20/2022  1:00 PM Julio Navarro PT [facility]   9/6/2022  9:30 AM MD CARLOS Poe GVANTHONY AMB   11/8/2022  9:20 AM MD SANJUANITA Chavarria [facility] AMB       Post Session Pain  Charge Capture  [facility] Portal  MD Guidelines  Scanned Media  Benefits  MyChart

## 2022-05-25 ENCOUNTER — APPOINTMENT (OUTPATIENT)
Dept: PHYSICAL THERAPY | Age: 72
End: 2022-05-25
Attending: OBSTETRICS & GYNECOLOGY
Payer: MEDICARE

## 2022-05-25 ENCOUNTER — HOSPITAL ENCOUNTER (OUTPATIENT)
Dept: PHYSICAL THERAPY | Age: 72
Setting detail: RECURRING SERIES
Discharge: HOME | End: 2022-05-28
Payer: MEDICARE

## 2022-05-25 PROCEDURE — 97110 THERAPEUTIC EXERCISES: CPT

## 2022-05-25 PROCEDURE — 97530 THERAPEUTIC ACTIVITIES: CPT

## 2022-05-25 RX ORDER — ATORVASTATIN CALCIUM 20 MG/1
TABLET, FILM COATED ORAL
Qty: 90 TABLET | Refills: 1 | Status: SHIPPED | OUTPATIENT
Start: 2022-05-25

## 2022-05-25 ASSESSMENT — PAIN SCALES - GENERAL: PAINLEVEL_OUTOF10: 0

## 2022-06-08 ENCOUNTER — PATIENT MESSAGE (OUTPATIENT)
Dept: FAMILY MEDICINE CLINIC | Facility: CLINIC | Age: 72
End: 2022-06-08

## 2022-06-08 ENCOUNTER — HOSPITAL ENCOUNTER (OUTPATIENT)
Dept: PHYSICAL THERAPY | Age: 72
Setting detail: RECURRING SERIES
End: 2022-06-08
Payer: MEDICARE

## 2022-06-08 ENCOUNTER — APPOINTMENT (OUTPATIENT)
Dept: PHYSICAL THERAPY | Age: 72
End: 2022-06-08
Attending: OBSTETRICS & GYNECOLOGY

## 2022-06-08 ENCOUNTER — OFFICE VISIT (OUTPATIENT)
Dept: FAMILY MEDICINE CLINIC | Facility: CLINIC | Age: 72
End: 2022-06-08
Payer: MEDICARE

## 2022-06-08 VITALS
HEART RATE: 59 BPM | WEIGHT: 290 LBS | TEMPERATURE: 97.5 F | SYSTOLIC BLOOD PRESSURE: 138 MMHG | OXYGEN SATURATION: 93 % | BODY MASS INDEX: 51.38 KG/M2 | DIASTOLIC BLOOD PRESSURE: 53 MMHG | HEIGHT: 63 IN | RESPIRATION RATE: 16 BRPM

## 2022-06-08 DIAGNOSIS — B96.89 ACUTE BACTERIAL SINUSITIS: Primary | ICD-10-CM

## 2022-06-08 DIAGNOSIS — E78.5 HYPERLIPIDEMIA, UNSPECIFIED HYPERLIPIDEMIA TYPE: ICD-10-CM

## 2022-06-08 DIAGNOSIS — F41.1 GAD (GENERALIZED ANXIETY DISORDER): ICD-10-CM

## 2022-06-08 DIAGNOSIS — M1A.00X0 IDIOPATHIC CHRONIC GOUT WITHOUT TOPHUS, UNSPECIFIED SITE: ICD-10-CM

## 2022-06-08 DIAGNOSIS — R09.81 NASAL CONGESTION: ICD-10-CM

## 2022-06-08 DIAGNOSIS — E03.9 ACQUIRED HYPOTHYROIDISM: ICD-10-CM

## 2022-06-08 DIAGNOSIS — E11.9 TYPE 2 DIABETES MELLITUS WITHOUT COMPLICATION, WITHOUT LONG-TERM CURRENT USE OF INSULIN (HCC): ICD-10-CM

## 2022-06-08 DIAGNOSIS — I10 ESSENTIAL HYPERTENSION: ICD-10-CM

## 2022-06-08 DIAGNOSIS — J01.90 ACUTE BACTERIAL SINUSITIS: Primary | ICD-10-CM

## 2022-06-08 DIAGNOSIS — R68.89 FLU-LIKE SYMPTOMS: ICD-10-CM

## 2022-06-08 DIAGNOSIS — R06.2 WHEEZE: ICD-10-CM

## 2022-06-08 PROCEDURE — 1036F TOBACCO NON-USER: CPT | Performed by: FAMILY MEDICINE

## 2022-06-08 PROCEDURE — G8419 CALC BMI OUT NRM PARAM NOF/U: HCPCS | Performed by: FAMILY MEDICINE

## 2022-06-08 PROCEDURE — 3046F HEMOGLOBIN A1C LEVEL >9.0%: CPT | Performed by: FAMILY MEDICINE

## 2022-06-08 PROCEDURE — 1123F ACP DISCUSS/DSCN MKR DOCD: CPT | Performed by: FAMILY MEDICINE

## 2022-06-08 PROCEDURE — G8399 PT W/DXA RESULTS DOCUMENT: HCPCS | Performed by: FAMILY MEDICINE

## 2022-06-08 PROCEDURE — G8427 DOCREV CUR MEDS BY ELIG CLIN: HCPCS | Performed by: FAMILY MEDICINE

## 2022-06-08 PROCEDURE — 2022F DILAT RTA XM EVC RTNOPTHY: CPT | Performed by: FAMILY MEDICINE

## 2022-06-08 PROCEDURE — 99214 OFFICE O/P EST MOD 30 MIN: CPT | Performed by: FAMILY MEDICINE

## 2022-06-08 PROCEDURE — 3017F COLORECTAL CA SCREEN DOC REV: CPT | Performed by: FAMILY MEDICINE

## 2022-06-08 PROCEDURE — 1090F PRES/ABSN URINE INCON ASSESS: CPT | Performed by: FAMILY MEDICINE

## 2022-06-08 RX ORDER — DOXYCYCLINE HYCLATE 100 MG
100 TABLET ORAL 2 TIMES DAILY
Qty: 20 TABLET | Refills: 0 | Status: SHIPPED | OUTPATIENT
Start: 2022-06-08 | End: 2022-06-18

## 2022-06-08 RX ORDER — FLUOXETINE HYDROCHLORIDE 20 MG/1
CAPSULE ORAL
Qty: 180 CAPSULE | Refills: 1 | Status: SHIPPED | OUTPATIENT
Start: 2022-06-08

## 2022-06-08 RX ORDER — AMOXICILLIN AND CLAVULANATE POTASSIUM 875; 125 MG/1; MG/1
TABLET, FILM COATED ORAL
COMMUNITY
Start: 2022-04-18 | End: 2022-08-30

## 2022-06-08 RX ORDER — LISINOPRIL AND HYDROCHLOROTHIAZIDE 20; 12.5 MG/1; MG/1
TABLET ORAL
Qty: 90 TABLET | Refills: 1 | Status: SHIPPED | OUTPATIENT
Start: 2022-06-08

## 2022-06-08 RX ORDER — ALBUTEROL SULFATE 90 UG/1
2 AEROSOL, METERED RESPIRATORY (INHALATION) EVERY 6 HOURS PRN
Qty: 18 G | Refills: 2 | Status: SHIPPED | OUTPATIENT
Start: 2022-06-08

## 2022-06-08 RX ORDER — LEVOTHYROXINE SODIUM 88 UG/1
TABLET ORAL
Qty: 90 TABLET | Refills: 1 | Status: SHIPPED | OUTPATIENT
Start: 2022-06-08

## 2022-06-08 RX ORDER — ALLOPURINOL 100 MG/1
TABLET ORAL
Qty: 90 TABLET | Refills: 1 | Status: SHIPPED | OUTPATIENT
Start: 2022-06-08

## 2022-06-08 ASSESSMENT — ENCOUNTER SYMPTOMS
SWOLLEN GLANDS: 0
NAUSEA: 0
RHINORRHEA: 1
ABDOMINAL PAIN: 0
WHEEZING: 0
SORE THROAT: 0
COUGH: 1
DIARRHEA: 0
SINUS PAIN: 0
VOMITING: 0

## 2022-06-08 ASSESSMENT — PATIENT HEALTH QUESTIONNAIRE - PHQ9
SUM OF ALL RESPONSES TO PHQ QUESTIONS 1-9: 0
SUM OF ALL RESPONSES TO PHQ9 QUESTIONS 1 & 2: 0
2. FEELING DOWN, DEPRESSED OR HOPELESS: 0
1. LITTLE INTEREST OR PLEASURE IN DOING THINGS: 0
SUM OF ALL RESPONSES TO PHQ QUESTIONS 1-9: 0

## 2022-06-08 NOTE — TELEPHONE ENCOUNTER
From: Viky Strange  To: Dr. Camilo Morris: 6/8/2022 1:23 PM EDT  Subject: Covid test    Our home rapid test were negative.

## 2022-06-08 NOTE — PROGRESS NOTES
Ata Burns  : 1950  Primary: Medicare Part A And B  Secondary: [facility]  [address] [place] [address]  Phone: [phone]  Fax: [phone] Plan Frequency: 1 time a week for 90 days    Plan of Care/Certification Expiration Date: 22      PT Visit Info:  No data recorded       OUTPATIENT PHYSICAL THERAPY 2022     Appt Desk   Episode   MyChart      Ms. Abdoul Bermudez cancelled for today due to illness.  >24 hours    Beroshan Escobar, PT    Future Appointments   Date Time Provider Nickolas Gates   2022 11:00 AM MD CARLOS Lino [facility] AMB   2022  7:00 PM Judith Escobar, PT [facility]   2022  1:00 PM Judith Escobar, PT [facility]   2022  9:30 AM MD CARLOS Lino [facility]   2022  9:20 AM Deyanira Echevarria MD [facility]

## 2022-06-08 NOTE — PROGRESS NOTES
HISTORY OF PRESENT ILLNESS  Chief Complaint   Patient presents with    Cough     returned from a cruise on 06/02 and sx started 06/04    Nasal Congestion     returned from a cruise on 06/02 and sx started 06/04  Scant sputum clear like - especially in the mornings. Mostly in the head. Stays sob but not    Has used the inhaler and astelin nasal spray. Has been on 3 cruises since January    Dr. Bodgan Tabares referred her to the weight surgeon and PT was helping until she started coughing a lot. Cough  Associated symptoms include headaches and rhinorrhea. Pertinent negatives include no chest pain, chills, ear pain, fever, myalgias, rash, sore throat, shortness of breath or wheezing. URI   This is a new problem. The current episode started in the past 7 days. The problem has been unchanged. There has been no fever. Associated symptoms include congestion, coughing, headaches and rhinorrhea. Pertinent negatives include no abdominal pain, chest pain, diarrhea, dysuria, ear pain, joint pain, joint swelling, nausea, neck pain, plugged ear sensation, rash, sinus pain, sneezing, sore throat, swollen glands, vomiting or wheezing. She has tried acetaminophen, inhaler use and increased fluids for the symptoms. The treatment provided mild relief. Review of Systems   Constitutional: Negative for activity change, appetite change, chills, fatigue and fever. HENT: Positive for congestion and rhinorrhea. Negative for ear pain, sinus pain, sneezing and sore throat. Respiratory: Positive for cough. Negative for shortness of breath and wheezing. Cardiovascular: Negative for chest pain. Gastrointestinal: Negative for abdominal pain, constipation, diarrhea, nausea and vomiting. Genitourinary: Negative for dysuria. Musculoskeletal: Negative for arthralgias, joint pain, myalgias and neck pain. Skin: Negative for rash. Neurological: Positive for headaches. Negative for dizziness.    Psychiatric/Behavioral: Negative for dysphoric mood. The patient is not nervous/anxious. Patient Active Problem List   Diagnosis    Health care maintenance    AN (stress urinary incontinence, female)    Type 2 diabetes mellitus ([facility]    Vitamin D deficiency    Personal history of anxiety disorder    Hyperlipidemia    Allergic rhinitis    Basal cell carcinoma of cheek    Urge incontinence of urine    Elevated blood uric acid level    Arthritis    Morbid obesity with BMI of 45.0-49.9, adult (HCC)    Acid reflux    Sleep apnea, obstructive    B12 deficiency    Back pain    Essential hypertension    Hypothyroidism    Malaise and fatigue         Blood pressure (!) 138/53, pulse 59, temperature 97.5 °F (36.4 °C), temperature source Temporal, resp. rate 16, height 5' 3\" (1.6 m), weight 290 lb (131.5 kg), SpO2 93 %, not currently breastfeeding. Pain Scale: 0 - No pain/10 Pain Location:   Body mass index is 51.37 kg/m². Physical Exam  Vitals and nursing note reviewed. Constitutional:       General: She is not in acute distress. Appearance: Normal appearance. She is normal weight. She is not toxic-appearing. HENT:      Head: Normocephalic and atraumatic. Nose: Congestion and rhinorrhea present. Right Turbinates: Swollen. Left Turbinates: Swollen. Mouth/Throat:      Mouth: Mucous membranes are moist. No injury or oral lesions. Tongue: No lesions. Pharynx: Oropharynx is clear. Uvula midline. No pharyngeal swelling, oropharyngeal exudate or posterior oropharyngeal erythema. Eyes:      General: Lids are normal.      Extraocular Movements: Extraocular movements intact. Conjunctiva/sclera: Conjunctivae normal.      Pupils: Pupils are equal.   Cardiovascular:      Heart sounds: Normal heart sounds. No murmur heard. No friction rub. No gallop. Pulmonary:      Effort: Pulmonary effort is normal. No respiratory distress. Breath sounds: Normal breath sounds.  No wheezing or rales.   Skin:     General: Skin is warm and dry. Neurological:      Mental Status: She is alert. Psychiatric:         Mood and Affect: Mood normal.         Behavior: Behavior normal.         Thought Content: Thought content normal.         Judgment: Judgment normal.            Results for orders placed or performed in visit on 06/08/22   COVID-19    Specimen: Nasopharyngeal Swab   Result Value Ref Range    SARS-CoV-2 Please find results under separate order     COVID-19    Specimen: Nasopharyngeal   Result Value Ref Range    Source Nasopharyngeal      SARS-CoV-2, PCR Not detected Not detected          ASSESSMENT and PLAN   Diagnosis Orders   1. Acute bacterial sinusitis  doxycycline hyclate (VIBRA-TABS) 100 MG tablet   2. Hyperlipidemia, unspecified  Lipid Panel   3. Nasal congestion     4. Flu-like symptoms  COVID-19    COVID-19   5. Essential hypertension  lisinopril-hydroCHLOROthiazide (PRINZIDE;ZESTORETIC) 20-12.5 MG per tablet    Comprehensive Metabolic Panel    Microalbumin / Creatinine Urine Ratio   6. Type 2 diabetes mellitus without complication, without long-term current use of insulin (HCC)  Hemoglobin A1C   7. Acquired hypothyroidism  levothyroxine (SYNTHROID) 88 MCG tablet    TSH   8. Idiopathic chronic gout without tophus, unspecified site  allopurinol (ZYLOPRIM) 100 MG tablet   9. NANO (generalized anxiety disorder)  FLUoxetine (PROZAC) 20 MG capsule   10. Wheeze  albuterol sulfate HFA (PROVENTIL;VENTOLIN;PROAIR) 108 (90 Base) MCG/ACT inhaler       Reviewed medications and side effects in detail    3m labs and then ov 2-7 days later    May want to consider doing a home covid test since our rapid test is not available at this time. Her other chronic conditions are stable at this time. She is stable on the current treatment and tolerating it well. No significant sides effects. Will not adjust therapy at this time, unless noted above. We will continue to monitor for any problems.   Medications refilled and lab work has been ordered where needed. Reviewed medications are explained including any potential interactions or side effects in detail. The patient's questions were answered. She  understands the above and has no further questions. Further workup and treatment should be done if symptoms persist, worsen or new symptoms occur. She  will call to notify us of any problems, complications or worsening symptoms. Follow-up and Dispositions    · Return in about 3 months (around 9/8/2022). There are no Patient Instructions on file for this visit. (Some details in this note may have been created with speech-recognition software. Some errors in speech recognition may have occurred.    Most of those have been corrected but some may have been missed.)         Alon Moffett MD  [address] [facility] [address]  Phone [phone]

## 2022-06-09 LAB — SARS-COV-2: NORMAL

## 2022-06-10 LAB
SARS-COV-2 RNA RESP QL NAA+PROBE: NOT DETECTED
SOURCE: NORMAL

## 2022-06-16 ASSESSMENT — ENCOUNTER SYMPTOMS
SHORTNESS OF BREATH: 0
CONSTIPATION: 0

## 2022-06-20 ENCOUNTER — HOSPITAL ENCOUNTER (OUTPATIENT)
Dept: PHYSICAL THERAPY | Age: 72
Setting detail: RECURRING SERIES
Discharge: HOME | End: 2022-06-23
Payer: MEDICARE

## 2022-06-20 ENCOUNTER — APPOINTMENT (OUTPATIENT)
Dept: PHYSICAL THERAPY | Age: 72
End: 2022-06-20
Attending: OBSTETRICS & GYNECOLOGY

## 2022-06-20 PROCEDURE — 97530 THERAPEUTIC ACTIVITIES: CPT

## 2022-06-20 PROCEDURE — 97110 THERAPEUTIC EXERCISES: CPT

## 2022-06-20 ASSESSMENT — PAIN SCALES - GENERAL: PAINLEVEL_OUTOF10: 0

## 2022-06-20 NOTE — THERAPY DISCHARGE
Wiley Fournier  : 1950  Primary: Medicare Part A And B  Secondary: [employer] SU[employer]  [address] [facility] [address]  Phone: [phone]  Fax: [phone] Plan Frequency: 1 time a week for 90 days    Plan of Care/Certification Expiration Date: 22      PT Visit Info:    No data recorded    OUTPATIENT PHYSICAL THERAPY:OP NOTE TYPE: Discharge Summary 2022               Episode  Appt Desk         Treatment Diagnosis:  Lack of coordination (muscle incoordination) (R27.8)  Generalized weakness (M62.81)  Pelvic Muscle Wasting (N81.84)  Mixed incontinence (Urge and stress incontinence) (N39.46)  Nocturia (R35.1)  Medical/Referring Diagnosis:  Urge incontinence [N39.41]  Stress incontinence (female) (male) [N39.3]  Referring Physician:  Aravind Eric DO MD Orders:  PT Eval and Treat   Return MD Appt:  Pt to call to schedule  Date of Onset:  No data recorded   Allergies:  Patient has no known allergies. Restrictions/Precautions:    Restrictions/Precautions: None  No data recorded   Medications Last Reviewed:  2022     SUBJECTIVE   History of Injury/Illness (Reason for Referral):  Ms. Darrel Arellano is a 80 yo female referred to pelvic floor physical therapy (PFPT) by Aravind Eric DO 2/2 Urge incontinence [N39.41]  Stress incontinence (female) (male) [N39.3]. 22: Pt w/ self reported improvements in urinary symptoms by 75%. She recently had a sinus infection and had increased AN with coughing and sneezing. This is gradually improving. She continues to wear incontinence briefs out of caution; thinks she could wear just at pad at this point but feels more comfortable with brief. Would like to continue with HEP. Urinary: Frequency 4-5 x/day, 1 x/night (from 2-4x/night) taking PM diuretics before 6p. Positive for stress urinary incontinence and urge urinary incontinence.  (UUI occasio   Negative for urge urinary incontinence, urinary urgency, urinary frequency, incomplete emptying, urinary hesitancy/intermittency, dysuria, hematuria, nocturia and nocturnal enuresis. Pt does use briefs for urinary protection; 1 brief per day (PPD). Fluid intake: 16 oz water/day; bladder irritants include: 1c coffee, 24oz diet coke (3-4x/week), 24oz lemonade (3-4x/week). Pt does not limit fluid intake due to bladder control. Bowel: Frequency daily. Positive for nothing. Negative for pain with bowel movement, pushing/straining with bowel movement, fecal incontinence, constipation and incomplete emptying. Pt does not use pads for bowel protection; 0 pads per day (PPD). Pope stool type: 4. Use of stool softeners or laxatives? No    Sexual: Pt is not sexually active. Patient Stated Goal(s):  \"to stop leakage and incontinence\"  Initial:     0/10 Post Session:     0/10  Past Medical History/Comorbidities:   Ms. Jodi Matamoros  has a past medical history of Acid reflux, Allergic rhinitis, cause unspecified, Arthritis, Back pain, Basal cell carcinoma of cheek, Elevated blood uric acid level, Hyperlipidemia, Hypertension, Malaise and fatigue, Morbid obesity ([address] Personal history of anxiety disorder, Sleep apnea, and Unspecified hypothyroidism. Ms. Jodi Matamoros  has a past surgical history that includes cyst removal (1970); orthopedic surgery; Total abdominal hysterectomy w/ bilateral salpingoophorectomy (2002); other surgical history (1974); and Breast reduction surgery (9/17/2013).       OBJECTIVE   External Observations:   Voluntary contraction: [] absent     [x] present   Involuntary contraction: [x] absent     [] present   Involuntary relaxation: [] absent     [x] present   Perineal descent at rest: [x] absent     [] present   Perineal descent with bearing: [x] absent     [] present   Skin integrity: WNL    Pelvic Floor Muscle (PFM) Assessment:   Vaginal vault size: [] decreased     [] increased     [x] WNL   Muscle volume: [] decreased     [x] WNL     [] Defect   PFM tension: [] decreased     [] increased     [x] WNL    Contraction ability:  Voluntary contraction: [] absent     [x] weak     [] moderate      [] strong  Voluntary relaxation: [] absent     [] partial     [x] complete   MMT: 1/5 w/ coordinated exhale  PFM endurance: ~5 seconds   Repetitions of endurance contractions: 5  Number of quick contractions in 10 seconds: difficulty w/ coordination  Quality of contractions: fair w/ moderate abdominal compensation  Overflow: [] absent     [] min     [x] mod     [] severe / Compensatory mm groups include abdominals    Tissue laxity test:  Anterior wall: [x] minimum     [] moderate     [] severe      [] WNL  Posterior wall: [x] minimum     [] moderate     [] severe      [] WNL    Palpation: via vaginal canal   Superficial Pelvic Floor Musculature (PFM): Tender? Intermediate PFM Tender? Deep PFM Tender? Superficial Transverse Perineal [] Right  [] Left  []DNT Deep Transverse Perineal [] Right  [] Left  []DNT Puborectalis [] Right  [] Left  []DNT   Ischiocavernosus [] Right  [] Left  []DNT Compressor Urethra [] Right  [] Left  []DNT Pubococcygeus [] Right  [] Left  []DNT   Bulbocavernosus [] Right  [] Left  []DNT   Iliococcygeus [] Right  [] Left  []DNT       Obturator Internus [] Right  [] Left  []DNT       Coccygeus [] Right  [] Left  []DNT     Breath assessment:  Observation: A/P chest expansion and lateral rib expansion  Coordination of pelvic floor muscles with breath: minimal pelvic floor muscle excursion and moderate pelvic floor muscle excursion  Co-contraction of PFM with transversus abdominis: present    ASSESSMENT   DISCHARGE SUMMARY: Ms. Mathew Valencia has been seen in skilled PT from 3/30/22 to 6/20/22. Patient has attended 7 out of 8 scheduled visits, with 1 cancellation(s) and 0 no shows.  Treatment has emphasized PFM retraining/coordination, general strengthening with integration of PFM and coordination of breathing in order to minimize IAP, bladder health education and retraining, including timed voiding and urge suppression to improve bladder control and minimize UUI and/or possible overflow incontinence. Patient responded well to therapy, with improvements in incontinence symptoms, however continued to have deficits in strength, coordination and integration of kegel with tasks including coughing, sneezing contributing to persistent AN. Pt has achieved goals as indicated below and all questions have been answered to their satisfaction. Pt was invited to call with any further questions or concerns as needed. PLAN   Effective Dates: 3/30/22 TO Plan of Care/Certification Expiration Date: 06/28/22     Frequency/Duration: Plan Frequency: 1 time a week for 90 days      Goals: (Goals have been discussed and agreed upon with patient.)  Short-Term Functional Goals: Time Frame: 6 weeks  1. Pt will demonstrate I with basic PFM HEP to improve awareness, coordination, and timing of PFM. (MET 6/20/22)  2. Patient will demonstrate understanding of and ability to teach back appropriate water intake, bladder irritants, toileting frequency, and positioning for improved self-management of symptoms. (MET 6/20/22)  3. Patient will demonstrate ability to isolate a pelvic floor contraction without breath holding and minimal to no accessory muscle use. (PARTIAL MET 6/20/22- continues to demonstrate compensation)  4. Patient will demonstrate appropriate co-contraction of the transversus abdominis and pelvic floor muscle group during exhalation in order to reduce IAP and improve functional task performance including sit to stand and bed mobility. (MET 6/20/22)  5. Patient will demonstrate ability to perform diaphragmatic breathing in multiple positions to assist in pelvic floor tension reduction. (MET 6/20/22)  Discharge Goals: Time Frame: 12 weeks  1.  Patient will demonstrate ability to voluntarily contract the pelvic floor muscles prior to a cough or sneeze for decreased leakage during increases in intra-abdominal pressure. (PARTIAL MET 6/20/22- able to activate, however weakness limiting ability to counteract increased IAP)  2. Patient will demonstrate independence with an advanced HEP for general conditioning, core stabilization, and mobility to facilitate carry over and independent management of symptoms. (MET 6/20/22)  3. Patient will improve outcome score by 12%. (MET 6/20/22)    4. Pt will demonstrate appropriate use of the pelvic floor muscle group (quick flicks and/or drops), without compensation, to implement urge suppression appropriately with urgency of urination and decrease the number of pads used per day. (MET 6/20/22)             Outcome Measure:   Pelvic Floor Impact Questionnaire--short form 7 (PFIQ-7):  Score:  Initial: 3/30/22  · Bladder or Urine: 38.1/100  · Bowel or Rectum: 14.29/100  · Vagina or Pelvis: 0/100 Most Recent: 6/20/22  · Bladder or Urine: 14.29/100  · Bowel or Rectum: 0/100  · Vagina or Pelvis: 0/100   Interpretation of Score: Each of the 7 sections is scored on a scale from 0-3; 0 representing \"Not at all\", 3 representing \"Quite a bit\". The mean value is taken from all the answered items, then multiplied by 100 to obtain the scale score, ranging from 0-100. This process is repeated for each column representing bowel, bladder, and pelvic pain. Total Duration:  Time In: 8632  Time Out: 1350    Regarding Marcial Reyes's therapy, I certify that the treatment plan above will be carried out by a therapist or under their direction.   Thank you for this referral,  Gisela Davis, PT          Post Session Pain  Charge Capture  PT Visit Info  POC Link  Treatment Note Link  MD Guidelines  MyChart

## 2022-06-20 NOTE — PROGRESS NOTES
Rishi Troy  : 1950  Primary: Medicare Part A And B  Secondary: [employer]  [address] [place] [address]  Phone: [phone]  Fax: [phone] Plan Frequency: 1 time a week for 90 days    Plan of Care/Certification Expiration Date: 22      PT Visit Info:    No data recorded    OUTPATIENT PHYSICAL THERAPY:OP NOTE TYPE: Treatment Note 2022       Episode   Appt Desk       Treatment Diagnosis:  Lack of coordination (muscle incoordination) (R27.8)  Generalized weakness (M62.81)  Pelvic Muscle Wasting (N81.84)  Mixed incontinence (Urge and stress incontinence) (N39.46)  Nocturia (R35.1)  Medical/Referring Diagnosis:  Urge incontinence [N39.41]  Stress incontinence (female) (male) [N39.3]  Referring Physician:  Fanny Merida DO  MD Orders:  PT Eval and Treat   Return MD appointment:  Not scheduled  Date of Onset:  Chronic  Allergies:  Patient has no known allergies. Restrictions/Precautions:    Restrictions/Precautions: None  No data recorded   Interventions Planned (Treatment may consist of any combination of the following):    Current Treatment Recommendations: Therapeutic activities; Patient/Caregiver education & training; Home exercise program; Manual Therapy - Soft Tissue Mobilization; Neuromuscular re-education; Endurance training; Strengthening; ROM; Functional mobility training; ADL/Self-care training     Subjective Comments:   Was doing well until she got a sinus infection. Improving since; most difficulty with coughing. Initial:     0/10 Post Session:     0/10  Medications Last Reviewed:  2022  Updated Objective Findings:  See discharge assessment     Treatment   THERAPEUTIC EXERCISE: (25 minutes): Exercises per grid below to improve mobility, strength and coordination.   Required moderate visual, verbal and tactile cues to promote proper body alignment, promote proper body posture and promote proper body increases intra-abdominal pressure (IAP) such as cough, sneeze, laugh, sit to stand, lifting object to decrease pressure on pelvic organs and muscles during exertional activities to minimize AN. and Extensive functional activity training on avoiding valsava/breath holding during strenuous activities as well as abdominal/pelvic bracing to provide muscular support and decrease symptoms of AN during lifting, sit to stand, getting in/out of car as well as log roll technique to decrease intraabdominal pressure. Review of previous instruction therapeutic activities. TA Educational Topic Notes Date Completed   Pathology/Anatomy/PFM Function  6/20/22   Bladder health education  6/20/22   Urinary urge suppression  6/20/22   The knack  6/20/22   Voiding strategies     Nocturia tips     Bowel/Bladder log     Bowel health education     Constipation care     Diarrhea/Fecal leakage     Colonic massage     Toilet positioning     Defecation dynamics     Sources of fiber     Return to intercourse/Dilator training     Sexual positioning     Lubricants/vaginal moisturizers     Vaginal irritants     Body mechanics Log roll w/ bed mobility 5/25/22 6/20/22   Posture/ergonomics Lifting mechanics to decreased IAP; lift & carry w/ 11#/21# 5/25/22 6/20/22   Diaphragmatic breathing     Resources and technology         Treatment/Session Summary:    · Treatment Assessment:    Ms. Brayan Cummings has been seen in skilled PT from 3/30/22 to 6/20/22. Patient has attended 7 out of 8 scheduled visits, with 1 cancellation(s) and 0 no shows. Treatment has emphasized PFM retraining/coordination, general strengthening with integration of PFM and coordination of breathing in order to minimize IAP, bladder health education and retraining, including timed voiding and urge suppression to improve bladder control and minimize UUI and/or possible overflow incontinence.  Patient responded well to therapy, with improvements in incontinence symptoms, however continued to have deficits in strength, coordination and integration of kegel with tasks including coughing, sneezing contributing to persistent AN. Pt has achieved goals as indicated below and all questions have been answered to their satisfaction. Pt was invited to call with any further questions or concerns as needed.   · Communication/Consultation:  None today  · Equipment provided today:  None    Total Treatment Billable Duration:  25 minutes TE, 15 minutes TA  Time In: 1305  Time Out: Caden Morris PT     Future Appointments   Date Time Provider Nickolas Gates   8/30/2022 10:00 AM CARLOS MISCELLANEOUS [facility]   9/6/2022  9:30 AM Saranya Asher MD [facility]   11/8/2022  9:20 [facility]       Post Session Pain  Charge Capture  [employer] Portal  MD Guidelines  Scanned Media  Benefits  MyChart

## 2022-06-27 ENCOUNTER — TRANSCRIBE ORDER (OUTPATIENT)
Dept: SCHEDULING | Age: 72
End: 2022-06-27

## 2022-08-30 ENCOUNTER — OFFICE VISIT (OUTPATIENT)
Dept: FAMILY MEDICINE CLINIC | Facility: CLINIC | Age: 72
End: 2022-08-30
Payer: MEDICARE

## 2022-08-30 ENCOUNTER — NURSE ONLY (OUTPATIENT)
Dept: FAMILY MEDICINE CLINIC | Facility: CLINIC | Age: 72
End: 2022-08-30

## 2022-08-30 VITALS
DIASTOLIC BLOOD PRESSURE: 61 MMHG | WEIGHT: 279.8 LBS | HEART RATE: 56 BPM | SYSTOLIC BLOOD PRESSURE: 137 MMHG | OXYGEN SATURATION: 94 % | HEIGHT: 63 IN | RESPIRATION RATE: 16 BRPM | TEMPERATURE: 97.7 F | BODY MASS INDEX: 49.58 KG/M2

## 2022-08-30 DIAGNOSIS — I10 ESSENTIAL HYPERTENSION: ICD-10-CM

## 2022-08-30 DIAGNOSIS — E11.9 TYPE 2 DIABETES MELLITUS WITHOUT COMPLICATION, WITHOUT LONG-TERM CURRENT USE OF INSULIN (HCC): ICD-10-CM

## 2022-08-30 DIAGNOSIS — J01.90 ACUTE BACTERIAL SINUSITIS: ICD-10-CM

## 2022-08-30 DIAGNOSIS — J20.9 ACUTE BRONCHITIS, UNSPECIFIED ORGANISM: Primary | ICD-10-CM

## 2022-08-30 DIAGNOSIS — E78.5 HYPERLIPIDEMIA, UNSPECIFIED HYPERLIPIDEMIA TYPE: ICD-10-CM

## 2022-08-30 DIAGNOSIS — R06.2 WHEEZING: ICD-10-CM

## 2022-08-30 DIAGNOSIS — R05.9 COUGH: ICD-10-CM

## 2022-08-30 DIAGNOSIS — B96.89 ACUTE BACTERIAL SINUSITIS: ICD-10-CM

## 2022-08-30 DIAGNOSIS — E03.9 ACQUIRED HYPOTHYROIDISM: ICD-10-CM

## 2022-08-30 DIAGNOSIS — J30.1 ALLERGIC RHINITIS DUE TO POLLEN, UNSPECIFIED SEASONALITY: ICD-10-CM

## 2022-08-30 LAB
CREAT UR-MCNC: 214 MG/DL
EXP DATE SOLUTION: NORMAL
EXP DATE SWAB: NORMAL
LOT NUMBER SOLUTION: NORMAL
LOT NUMBER SWAB: NORMAL
MICROALBUMIN UR-MCNC: 0.52 MG/DL
MICROALBUMIN/CREAT UR-RTO: 2 MG/G
SARS-COV-2 RNA, POC: NEGATIVE

## 2022-08-30 PROCEDURE — 99214 OFFICE O/P EST MOD 30 MIN: CPT | Performed by: FAMILY MEDICINE

## 2022-08-30 PROCEDURE — 1090F PRES/ABSN URINE INCON ASSESS: CPT | Performed by: FAMILY MEDICINE

## 2022-08-30 PROCEDURE — 3017F COLORECTAL CA SCREEN DOC REV: CPT | Performed by: FAMILY MEDICINE

## 2022-08-30 PROCEDURE — 96372 THER/PROPH/DIAG INJ SC/IM: CPT | Performed by: FAMILY MEDICINE

## 2022-08-30 PROCEDURE — G8427 DOCREV CUR MEDS BY ELIG CLIN: HCPCS | Performed by: FAMILY MEDICINE

## 2022-08-30 PROCEDURE — G8417 CALC BMI ABV UP PARAM F/U: HCPCS | Performed by: FAMILY MEDICINE

## 2022-08-30 PROCEDURE — 87635 SARS-COV-2 COVID-19 AMP PRB: CPT | Performed by: FAMILY MEDICINE

## 2022-08-30 PROCEDURE — G8399 PT W/DXA RESULTS DOCUMENT: HCPCS | Performed by: FAMILY MEDICINE

## 2022-08-30 PROCEDURE — 1123F ACP DISCUSS/DSCN MKR DOCD: CPT | Performed by: FAMILY MEDICINE

## 2022-08-30 PROCEDURE — 1036F TOBACCO NON-USER: CPT | Performed by: FAMILY MEDICINE

## 2022-08-30 RX ORDER — TRIAMCINOLONE ACETONIDE 40 MG/ML
40 INJECTION, SUSPENSION INTRA-ARTICULAR; INTRAMUSCULAR ONCE
Status: COMPLETED | OUTPATIENT
Start: 2022-08-30 | End: 2022-08-30

## 2022-08-30 RX ORDER — AZITHROMYCIN 250 MG/1
TABLET, FILM COATED ORAL
Qty: 1 PACKET | Refills: 0 | Status: SHIPPED | OUTPATIENT
Start: 2022-08-30 | End: 2022-09-06 | Stop reason: ALTCHOICE

## 2022-08-30 RX ADMIN — TRIAMCINOLONE ACETONIDE 40 MG: 40 INJECTION, SUSPENSION INTRA-ARTICULAR; INTRAMUSCULAR at 14:54

## 2022-08-30 SDOH — HEALTH STABILITY: PHYSICAL HEALTH: ON AVERAGE, HOW MANY MINUTES DO YOU ENGAGE IN EXERCISE AT THIS LEVEL?: 0 MIN

## 2022-08-30 SDOH — HEALTH STABILITY: PHYSICAL HEALTH: ON AVERAGE, HOW MANY DAYS PER WEEK DO YOU ENGAGE IN MODERATE TO STRENUOUS EXERCISE (LIKE A BRISK WALK)?: 0 DAYS

## 2022-08-30 ASSESSMENT — LIFESTYLE VARIABLES
HOW OFTEN DO YOU HAVE A DRINK CONTAINING ALCOHOL: 2
HOW OFTEN DO YOU HAVE SIX OR MORE DRINKS ON ONE OCCASION: 1
HOW OFTEN DO YOU HAVE A DRINK CONTAINING ALCOHOL: MONTHLY OR LESS
HOW MANY STANDARD DRINKS CONTAINING ALCOHOL DO YOU HAVE ON A TYPICAL DAY: 1
HOW MANY STANDARD DRINKS CONTAINING ALCOHOL DO YOU HAVE ON A TYPICAL DAY: 1 OR 2

## 2022-08-30 ASSESSMENT — PATIENT HEALTH QUESTIONNAIRE - PHQ9
1. LITTLE INTEREST OR PLEASURE IN DOING THINGS: 0
1. LITTLE INTEREST OR PLEASURE IN DOING THINGS: 1
SUM OF ALL RESPONSES TO PHQ9 QUESTIONS 1 & 2: 0
SUM OF ALL RESPONSES TO PHQ QUESTIONS 1-9: 0
SUM OF ALL RESPONSES TO PHQ QUESTIONS 1-9: 2
SUM OF ALL RESPONSES TO PHQ QUESTIONS 1-9: 2
2. FEELING DOWN, DEPRESSED OR HOPELESS: 1
SUM OF ALL RESPONSES TO PHQ QUESTIONS 1-9: 0
SUM OF ALL RESPONSES TO PHQ QUESTIONS 1-9: 0
2. FEELING DOWN, DEPRESSED OR HOPELESS: 0
SUM OF ALL RESPONSES TO PHQ QUESTIONS 1-9: 0
SUM OF ALL RESPONSES TO PHQ QUESTIONS 1-9: 2
SUM OF ALL RESPONSES TO PHQ9 QUESTIONS 1 & 2: 2
SUM OF ALL RESPONSES TO PHQ QUESTIONS 1-9: 2

## 2022-08-30 ASSESSMENT — ENCOUNTER SYMPTOMS
WHEEZING: 1
SINUS PAIN: 1
ABDOMINAL PAIN: 0
DIARRHEA: 0
VOMITING: 0
SWOLLEN GLANDS: 0
NAUSEA: 0
RHINORRHEA: 1
COUGH: 1

## 2022-08-30 NOTE — PROGRESS NOTES
HISTORY OF PRESENT ILLNESS  Chief Complaint   Patient presents with    Nasal Congestion    Cough    Chest Congestion     Sxs started on Saturday  started coughing a little  Some thick and some thin sputum  no color at this time. Coughed so much that back and chest is achy. Head so stopped up not getting enough air at times. Will get runny nose but when goes to blow it not much comes out. 2 covid tests negative. Yesterday started feeling really bad. Had been to a  out of town. Using the inhaler and nose spray. Used a nebulizer treatment last night - seemed lie it helps some for a little while. Sat got down to 89 last pm.    Is similar to what she is had before. Any time she has a cold will get down into the chest.    Is allergic to the grasses at the .  On her usual allergy medications. URI   This is a new problem. The current episode started in the past 7 days. The problem has been gradually worsening. There has been no fever. Associated symptoms include congestion, coughing, joint swelling, rhinorrhea, sinus pain and wheezing. Pertinent negatives include no abdominal pain, chest pain, diarrhea, dysuria, ear pain, headaches, joint pain, nausea, neck pain, plugged ear sensation, rash, sneezing, swollen glands or vomiting. She has tried inhaler use and increased fluids for the symptoms. The treatment provided mild relief. Review of Systems   HENT:  Positive for congestion, rhinorrhea and sinus pain. Negative for ear pain and sneezing. Respiratory:  Positive for cough and wheezing. Cardiovascular:  Negative for chest pain. Gastrointestinal:  Negative for abdominal pain, diarrhea, nausea and vomiting. Genitourinary:  Negative for dysuria. Musculoskeletal:  Negative for joint pain and neck pain. Skin:  Negative for rash. Neurological:  Negative for headaches.       Patient Active Problem List   Diagnosis    Health care maintenance    AN (stress urinary Mood and Affect: Mood normal.         Behavior: Behavior normal.         Thought Content: Thought content normal.         Judgment: Judgment normal.      Results for orders placed or performed in visit on 08/30/22   AMB POC COVID-19 COV   Result Value Ref Range    SARS-COV-2 RNA, POC Negative     Lot number swab NA     EXP date swab NA     Lot number solution NA     EXP date solution NA           ASSESSMENT and PLAN   Diagnosis Orders   1. Acute bronchitis, unspecified organism  azithromycin (ZITHROMAX) 250 MG tablet      2. Cough  AMB POC COVID-19 COV      3. Acute bacterial sinusitis  azithromycin (ZITHROMAX) 250 MG tablet    triamcinolone acetonide (KENALOG-40) injection 40 mg      4. Wheezing        5. Allergic rhinitis due to pollen, unspecified seasonality            Reviewed medications and side effects in detail    The patient's condition was discussed at length with the patient. The expected course and possible complications were reviewed. All questions were answered. Her other chronic conditions are stable at this time. She is stable on the current treatment and tolerating it well. No significant sides effects. Will not adjust therapy at this time, unless noted above. We will continue to monitor for any problems. Medications refilled and lab work has been ordered where needed. Reviewed medications are explained including any potential interactions or side effects in detail. The patient's questions were answered. She  understands the above and has no further questions. Further workup and treatment should be done if symptoms persist, worsen or new symptoms occur. She  will call to notify us of any problems, complications or worsening symptoms. There are no Patient Instructions on file for this visit. (Some details in this note may have been created with speech-recognition software. Some errors in speech recognition may have occurred.    Most of those have been corrected but some may have been missed.)         Cornelio Myers MD  [facility]  [address],[place] [address]  Phone [phone]

## 2022-08-31 LAB
ALBUMIN SERPL-MCNC: 3.6 G/DL (ref 3.2–4.6)
ALBUMIN/GLOB SERPL: 1.2 {RATIO} (ref 1.2–3.5)
ALP SERPL-CCNC: 101 U/L (ref 50–136)
ALT SERPL-CCNC: 35 U/L (ref 12–65)
ANION GAP SERPL CALC-SCNC: 3 MMOL/L (ref 7–16)
AST SERPL-CCNC: 22 U/L (ref 15–37)
BILIRUB SERPL-MCNC: 0.6 MG/DL (ref 0.2–1.1)
BUN SERPL-MCNC: 10 MG/DL (ref 8–23)
CALCIUM SERPL-MCNC: 9.4 MG/DL (ref 8.3–10.4)
CHLORIDE SERPL-SCNC: 100 MMOL/L (ref 98–107)
CHOLEST SERPL-MCNC: 134 MG/DL
CO2 SERPL-SCNC: 31 MMOL/L (ref 21–32)
CREAT SERPL-MCNC: 1 MG/DL (ref 0.6–1)
EST. AVERAGE GLUCOSE BLD GHB EST-MCNC: 128 MG/DL
GLOBULIN SER CALC-MCNC: 2.9 G/DL (ref 2.3–3.5)
GLUCOSE SERPL-MCNC: 117 MG/DL (ref 65–100)
HBA1C MFR BLD: 6.1 % (ref 4.8–5.6)
HDLC SERPL-MCNC: 39 MG/DL (ref 40–60)
HDLC SERPL: 3.4 {RATIO}
LDLC SERPL CALC-MCNC: 68.6 MG/DL
POTASSIUM SERPL-SCNC: 3.4 MMOL/L (ref 3.5–5.1)
PROT SERPL-MCNC: 6.5 G/DL (ref 6.3–8.2)
SODIUM SERPL-SCNC: 134 MMOL/L (ref 136–145)
TRIGL SERPL-MCNC: 132 MG/DL (ref 35–150)
TSH, 3RD GENERATION: 3.93 UIU/ML (ref 0.36–3.74)
VLDLC SERPL CALC-MCNC: 26.4 MG/DL (ref 6–23)

## 2022-09-03 DIAGNOSIS — K21.9 GASTRO-ESOPHAGEAL REFLUX DISEASE WITHOUT ESOPHAGITIS: ICD-10-CM

## 2022-09-05 NOTE — RESULT ENCOUNTER NOTE
Your A1c is in the prediabetic range. That means your average blood sugar is elevated. That puts you at higher risk for heart disease. Your potassium was also slightly low and your blood sugar elevated on this blood reading. Your good cholesterol, HDL, is on the low side. Improving that level is hard to do. You need to work on increasing your exercise and decreasing the cholesterol in your diet. The [facility] of cardiology guidelines stress the importance of lifestyle modification as the foundation to lowering your cardiovascular disease risk. This includes eating a heart healthy nutritious diet, regular aerobic physical activity maintenance of desired body weight and avoidance of tobacco products. Continue your current meds. Recheck labs with an appointment in 3-6 months.  Other labs are normal.

## 2022-09-06 ENCOUNTER — OFFICE VISIT (OUTPATIENT)
Dept: FAMILY MEDICINE CLINIC | Facility: CLINIC | Age: 72
End: 2022-09-06
Payer: MEDICARE

## 2022-09-06 VITALS
DIASTOLIC BLOOD PRESSURE: 58 MMHG | HEIGHT: 63 IN | BODY MASS INDEX: 49.79 KG/M2 | OXYGEN SATURATION: 93 % | HEART RATE: 53 BPM | TEMPERATURE: 96.9 F | RESPIRATION RATE: 16 BRPM | SYSTOLIC BLOOD PRESSURE: 150 MMHG | WEIGHT: 281 LBS

## 2022-09-06 DIAGNOSIS — K21.9 GASTROESOPHAGEAL REFLUX DISEASE WITHOUT ESOPHAGITIS: ICD-10-CM

## 2022-09-06 DIAGNOSIS — I10 ESSENTIAL HYPERTENSION: ICD-10-CM

## 2022-09-06 DIAGNOSIS — E78.5 HYPERLIPIDEMIA, UNSPECIFIED HYPERLIPIDEMIA TYPE: ICD-10-CM

## 2022-09-06 DIAGNOSIS — E03.9 HYPOTHYROIDISM, UNSPECIFIED TYPE: ICD-10-CM

## 2022-09-06 DIAGNOSIS — E53.8 B12 DEFICIENCY: ICD-10-CM

## 2022-09-06 DIAGNOSIS — E66.01 CLASS 3 SEVERE OBESITY WITH SERIOUS COMORBIDITY AND BODY MASS INDEX (BMI) OF 45.0 TO 49.9 IN ADULT, UNSPECIFIED OBESITY TYPE (HCC): ICD-10-CM

## 2022-09-06 DIAGNOSIS — Z74.1 REQUIRES ASSISTANCE WITH ACTIVITIES OF DAILY LIVING (ADL): ICD-10-CM

## 2022-09-06 DIAGNOSIS — M1A.00X0 IDIOPATHIC CHRONIC GOUT WITHOUT TOPHUS, UNSPECIFIED SITE: ICD-10-CM

## 2022-09-06 DIAGNOSIS — J30.1 NON-SEASONAL ALLERGIC RHINITIS DUE TO POLLEN: ICD-10-CM

## 2022-09-06 DIAGNOSIS — Z11.59 NEED FOR HEPATITIS C SCREENING TEST: ICD-10-CM

## 2022-09-06 DIAGNOSIS — E55.9 VITAMIN D DEFICIENCY: ICD-10-CM

## 2022-09-06 DIAGNOSIS — Z00.00 MEDICARE ANNUAL WELLNESS VISIT, SUBSEQUENT: Primary | ICD-10-CM

## 2022-09-06 DIAGNOSIS — N18.30 STAGE 3 CHRONIC KIDNEY DISEASE, UNSPECIFIED WHETHER STAGE 3A OR 3B CKD (HCC): ICD-10-CM

## 2022-09-06 DIAGNOSIS — E11.9 TYPE 2 DIABETES MELLITUS WITHOUT COMPLICATION, WITHOUT LONG-TERM CURRENT USE OF INSULIN (HCC): ICD-10-CM

## 2022-09-06 DIAGNOSIS — M19.90 ARTHRITIS: ICD-10-CM

## 2022-09-06 DIAGNOSIS — Z79.899 ENCOUNTER FOR LONG-TERM (CURRENT) USE OF MEDICATIONS: ICD-10-CM

## 2022-09-06 PROCEDURE — 1036F TOBACCO NON-USER: CPT | Performed by: FAMILY MEDICINE

## 2022-09-06 PROCEDURE — 99214 OFFICE O/P EST MOD 30 MIN: CPT | Performed by: FAMILY MEDICINE

## 2022-09-06 PROCEDURE — 1123F ACP DISCUSS/DSCN MKR DOCD: CPT | Performed by: FAMILY MEDICINE

## 2022-09-06 PROCEDURE — G8399 PT W/DXA RESULTS DOCUMENT: HCPCS | Performed by: FAMILY MEDICINE

## 2022-09-06 PROCEDURE — 2022F DILAT RTA XM EVC RTNOPTHY: CPT | Performed by: FAMILY MEDICINE

## 2022-09-06 PROCEDURE — 3044F HG A1C LEVEL LT 7.0%: CPT | Performed by: FAMILY MEDICINE

## 2022-09-06 PROCEDURE — 1090F PRES/ABSN URINE INCON ASSESS: CPT | Performed by: FAMILY MEDICINE

## 2022-09-06 PROCEDURE — G8417 CALC BMI ABV UP PARAM F/U: HCPCS | Performed by: FAMILY MEDICINE

## 2022-09-06 PROCEDURE — 3017F COLORECTAL CA SCREEN DOC REV: CPT | Performed by: FAMILY MEDICINE

## 2022-09-06 PROCEDURE — G0439 PPPS, SUBSEQ VISIT: HCPCS | Performed by: FAMILY MEDICINE

## 2022-09-06 PROCEDURE — G8427 DOCREV CUR MEDS BY ELIG CLIN: HCPCS | Performed by: FAMILY MEDICINE

## 2022-09-06 RX ORDER — OMEPRAZOLE 20 MG/1
CAPSULE, DELAYED RELEASE ORAL
Qty: 90 CAPSULE | Refills: 3 | Status: SHIPPED | OUTPATIENT
Start: 2022-09-06

## 2022-09-06 RX ORDER — OMEPRAZOLE 20 MG/1
CAPSULE, DELAYED RELEASE ORAL
Qty: 90 CAPSULE | Refills: 3 | OUTPATIENT
Start: 2022-09-06

## 2022-09-06 RX ORDER — MELOXICAM 15 MG/1
TABLET ORAL
Qty: 90 TABLET | Refills: 1 | Status: SHIPPED | OUTPATIENT
Start: 2022-09-06

## 2022-09-06 RX ORDER — MONTELUKAST SODIUM 10 MG/1
TABLET ORAL
Qty: 90 TABLET | Refills: 3 | Status: SHIPPED | OUTPATIENT
Start: 2022-09-06

## 2022-09-06 ASSESSMENT — ENCOUNTER SYMPTOMS
WHEEZING: 0
SHORTNESS OF BREATH: 0
VOMITING: 0
RHINORRHEA: 0
DIARRHEA: 0
NAUSEA: 0
COUGH: 1
CONSTIPATION: 0
ABDOMINAL PAIN: 0

## 2022-09-06 NOTE — PROGRESS NOTES
HISTORY OF PRESENT ILLNESS  Chief Complaint   Patient presents with    Medicare AWV       Wt Readings from Last 3 Encounters:   09/06/22 281 lb (127.5 kg)   08/30/22 279 lb 12.8 oz (126.9 kg)   06/08/22 290 lb (131.5 kg)      BP Readings from Last 3 Encounters:   09/06/22 (!) 150/58   08/30/22 137/61   06/08/22 (!) 138/53      Feeling better. Finished her zpak and still has a little congestion. Sometimes will have to have  help with toileting and dressing due to breathing. Was referred to bariatrics. Diabetes Mellitus: Patient presents for follow up of diabetes. Symptoms: taking medications as instructed, no medication side effects noted, no TIA's, no chest pain on exertion, no dyspnea on exertion, no swelling of ankles. Symptoms have well controlled. Patient denies visual disturbances. Evaluation to date has been included below. Home sugars: BGs consistently in an acceptable range. Treatment to date: medications. Diabetic issues reviewed with her: low cholesterol diet, weight control and daily exercise discussed.   Key Antihyperglycemic Medications            Semaglutide,0.25 or 0.5MG/DOS, 2 MG/1.5ML SOPN (Taking)    Sig: Give 0.5 mg SQ in abdomen, thigh, or upper arm; rotate inj sites Q week    Semaglutide,0.25 or 0.5MG/DOS, 2 MG/1.5ML SOPN (Taking)    Sig: Give 0.25 mg SQ in abdomen, thigh, or upper arm; rotate inj sites Q week for 4 weeks then increase to 0.5 mg qweek    Class: NO PRINT    metFORMIN (GLUCOPHAGE-XR) 500 MG extended release tablet (Taking)    Class: Historical Med           Lab Results   Component Value Date    LABA1C 6.1 (H) 08/30/2022     Lab Results   Component Value Date     08/30/2022      Lab Results   Component Value Date    CREATININE 1.00 08/30/2022      Lab Results   Component Value Date    LABMICR 0.52 08/30/2022      Wt Readings from Last 3 Encounters:   09/06/22 281 lb (127.5 kg)   08/30/22 279 lb 12.8 oz (126.9 kg)   06/08/22 290 lb (131.5 kg) BP Readings from Last 3 Encounters:   09/06/22 (!) 150/58   08/30/22 137/61   06/08/22 (!) 138/53          HPI  Diabetes  The history is provided by the patient. This is a new problem. The current episode started more than 1 week ago. The problem occurs daily. The problem has not changed since onset. Pertinent negatives include no chest pain, no abdominal pain, no headaches and no shortness of breath. The symptoms are aggravated by eating. The symptoms are relieved by medications. The treatment provided mild relief. Hypertension   The history is provided by the patient. This is a chronic problem. The current episode started more than 1 week ago. The problem has not changed since onset. Associated symptoms include anxiety and peripheral edema. Pertinent negatives include no chest pain, no palpitations, no malaise/fatigue, no blurred vision, no headaches, no tinnitus, no dizziness, no shortness of breath, no nausea and no vomiting. Agents associated with hypertension include NSAIDs and thyroid hormones. Risk factors include dyslipidemia, hypertension and postmenopause. Cholesterol Problem  The history is provided by the patient. This is a chronic problem. The current episode started more than 1 week ago. The problem occurs daily. The problem has not changed since onset. Pertinent negatives include no chest pain, no abdominal pain, no headaches and no shortness of breath. The symptoms are aggravated by eating. The symptoms are relieved by medications and walking. The treatment provided moderate relief. Anxiety  The history is provided by the patient. This is a new problem. The current episode started more than 1 week ago. The problem occurs daily. The problem has been gradually worsening. Pertinent negatives include no chest pain, no abdominal pain, no headaches and no shortness of breath. The symptoms are aggravated by stress. Nothing relieves the symptoms. She has tried rest for the symptoms.  The treatment normal.      Pupils: Pupils are equal.   Cardiovascular:      Rate and Rhythm: Normal rate and regular rhythm. Heart sounds: Normal heart sounds. No murmur heard. No friction rub. No gallop. Pulmonary:      Effort: Pulmonary effort is normal. No respiratory distress. Breath sounds: Normal breath sounds. No wheezing or rales. Musculoskeletal:      Right lower leg: No edema. Left lower leg: No edema. Skin:     General: Skin is warm and dry. Neurological:      General: No focal deficit present. Mental Status: She is alert. Psychiatric:         Mood and Affect: Mood normal.         Behavior: Behavior normal.         Thought Content: Thought content normal.         Judgment: Judgment normal.            ASSESSMENT and PLAN   Diagnosis Orders   1. Medicare annual wellness visit, subsequent        2. Essential hypertension        3. Non-seasonal allergic rhinitis due to pollen  montelukast (SINGULAIR) 10 MG tablet      4. Type 2 diabetes mellitus without complication, without long-term current use of insulin ([facility])  Microalbumin / Creatinine Urine Ratio    Hemoglobin A1C    Semaglutide,0.25 or 0.5MG/DOS, 2 MG/1.5ML SOPN    Semaglutide,0.25 or 0.5MG/DOS, 2 MG/1.5ML SOPN      5. Gastroesophageal reflux disease without esophagitis  omeprazole (PRILOSEC) 20 MG delayed release capsule      6. Hypothyroidism, unspecified type        7. Arthritis  meloxicam (MOBIC) 15 MG tablet      8. Idiopathic chronic gout without tophus, unspecified site        9. Stage 3 chronic kidney disease, unspecified whether stage 3a or 3b CKD ([facility] [address]        10. Hyperlipidemia, unspecified hyperlipidemia type  Lipid Panel      11. Vitamin D deficiency  Vitamin D 25 Hydroxy      12. Need for hepatitis C screening test  Hepatitis C Ab, Rflx to Qt by PCR      13. B12 deficiency  Vitamin B12      14. Encounter for long-term (current) use of medications  Magnesium      15.  Class 3 severe obesity with serious comorbidity and body insulin (HCC)  -     Microalbumin / Creatinine Urine Ratio; Future  -     Hemoglobin A1C; Future  -     Semaglutide,0.25 or 0.5MG/DOS, 2 MG/1.5ML SOPN; Give 0.5 mg SQ in abdomen, thigh, or upper arm; rotate inj sites Q week, Disp-4 Adjustable Dose Pre-filled Pen Syringe, R-3Normal  -     Semaglutide,0.25 or 0.5MG/DOS, 2 MG/1.5ML SOPN; Give 0.25 mg SQ in abdomen, thigh, or upper arm; rotate inj sites Q week for 4 weeks then increase to 0.5 mg qweek, Disp-4 Adjustable Dose Pre-filled Pen Syringe, R-1NO PRINT  Gastroesophageal reflux disease without esophagitis  -     omeprazole (PRILOSEC) 20 MG delayed release capsule; TAKE 1 CAPSULE BY MOUTH EVERY MORNING FOR REFLUX, Disp-90 capsule, R-3Normal  Hypothyroidism, unspecified type  Arthritis  -     meloxicam (MOBIC) 15 MG tablet; TAKE 1 TABLET BY MOUTH EVERY DAY FOR PAIN AND INFLAMMATION, Disp-90 tablet, R-1Normal  Idiopathic chronic gout without tophus, unspecified site  Stage 3 chronic kidney disease, unspecified whether stage 3a or 3b CKD (HCC)  Hyperlipidemia, unspecified hyperlipidemia type  -     Lipid Panel; Future  Vitamin D deficiency  -     Vitamin D 25 Hydroxy; Future  Need for hepatitis C screening test  -     Hepatitis C Ab, Rflx to Qt by PCR; Future  B12 deficiency  -     Vitamin B12; Future  Encounter for long-term (current) use of medications  -     Magnesium; Future  Class 3 severe obesity with serious comorbidity and body mass index (BMI) of 45.0 to 49.9 in adult, unspecified obesity type ([facility] [address]  Requires assistance with activities of daily living (ADL)    Recommendations for Preventive Services Due: see orders and patient instructions/AVS.  Recommended screening schedule for the next 5-10 years is provided to the patient in written form: see Patient Instructions/AVS.     Return for Medicare Annual Wellness Visit in 1 year, AWE.      Subjective       Patient's complete Health Risk Assessment and screening values have been reviewed and are found in Flowsheets. The following problems were reviewed today and where indicated follow up appointments were made and/or referrals ordered. Positive Risk Factor Screenings with Interventions:              Health Habits/Nutrition:  Physical Activity: Inactive    Days of Exercise per Week: 0 days    Minutes of Exercise per Session: 0 min     Have you lost any weight without trying in the past 3 months?: No  Body mass index: (!) 49.77  Have you seen the dentist within the past year?: Yes  Health Habits/Nutrition Interventions:  Inadequate physical activity:  patient is not ready to increase his/her physical activity level at this time      ADLs:  In the past 7 days, did you need help from others to perform any of the following everyday activities: Eating, dressing, grooming, bathing, toileting, or walking/balance?: (!) Yes  Select all that apply: (!) Dressing, Toileting  In the past 7 days, did you need help from others to take care of any of the following: Laundry, housekeeping, banking/finances, shopping, telephone use, food preparation, transportation, or taking medications?: No  ADL Interventions:  Discussed. Should be able to complete adl's better with some weight loss          Objective   Vitals:    09/06/22 0934 09/06/22 1015   BP: (!) 144/58 (!) 150/58   Site: Left Lower Arm Left Lower Arm   Position: Sitting Sitting   Cuff Size: Large Adult    Pulse: 53    Resp: 16    Temp: 96.9 °F (36.1 °C)    TempSrc: Temporal    SpO2: 93%    Weight: 281 lb (127.5 kg)    Height: 5' 3\" (1.6 m)       Body mass index is 49.78 kg/m². No Known Allergies  Prior to Visit Medications    Medication Sig Taking?  Authorizing Provider   montelukast (SINGULAIR) 10 MG tablet TAKE 1 TABLET BY MOUTH DAILY FOR ALLERGIES Yes Taiwo Woods MD   omeprazole (PRILOSEC) 20 MG delayed release capsule TAKE 1 CAPSULE BY MOUTH EVERY MORNING FOR REFLUX Yes Taiwo Woods MD   meloxicam (MOBIC) 15 MG tablet TAKE 1 TABLET BY MOUTH EVERY DAY FOR Tish Torres Yes Ricky Vargas MD   Semaglutide,0.25 or 0.5MG/DOS, 2 MG/1.5ML SOPN Give 0.5 mg SQ in abdomen, thigh, or upper arm; rotate inj sites Q week Yes Ricky Vargas MD   Semaglutide,0.25 or 0.5MG/DOS, 2 MG/1.5ML SOPN Give 0.25 mg SQ in abdomen, thigh, or upper arm; rotate inj sites Q week for 4 weeks then increase to 0.5 mg qweek Yes Ricky Vargas MD   albuterol sulfate HFA (PROVENTIL;VENTOLIN;PROAIR) 108 (90 Base) MCG/ACT inhaler Inhale 2 puffs into the lungs every 6 hours as needed for Wheezing Yes Ricky Vargas MD   FLUoxetine (PROZAC) 20 MG capsule TAKE 1 TO 2 CAPSULES BY MOUTH EVERY MORNING FOR MOOD Yes Ricky Vargas MD   allopurinol (ZYLOPRIM) 100 MG tablet TAKE 1 TABLET BY MOUTH EVERY DAY Yes Ricky Vargas MD   lisinopril-hydroCHLOROthiazide (PRINZIDE;ZESTORETIC) 20-12.5 MG per tablet TAKE 1 TABLET BY MOUTH EVERY DAY Yes Ricky Vargas MD   levothyroxine (SYNTHROID) 88 MCG tablet TAKE 1 TABLET BY MOUTH EVERY DAY BEFORE BREAKFAST Yes Ricky Vargas MD   atorvastatin (LIPITOR) 20 MG tablet TAKE 1 TABLET BY MOUTH AT BEDTIME FOR CHOLESTEROL Yes Ricky Vargas MD   azelastine HCl 0.15 % SOLN 2 sprays by Nasal route 2 times daily Yes Ar Automatic Reconciliation   Cholecalciferol 25 MCG/0.03ML LIQD Take by mouth Yes Ar Automatic Reconciliation   cyanocobalamin 1000 MCG/ML injection 1 ml IM q week for 8 weeks then 1 ml month Dx: pernicious anemia Indications: anemia due to vitamin B12 deficiency-pernicious anemia Yes Ar Automatic Reconciliation   ergocalciferol (ERGOCALCIFEROL) 1.25 MG (91756 UT) capsule TAKE ONE CAPSULE BY MOUTH EVERY SEVEN DAYS Yes Ar Automatic Reconciliation   furosemide (LASIX) 20 MG tablet TAKE 1 TO 2 TABLETS BY MOUTH EVERY MORNING Yes Ar Automatic Reconciliation   levocetirizine (XYZAL) 5 MG tablet TAKE 1 TAB BY MOUTH DAILY.  FOR ALLERGIES ( INSTEAD OF ZYRTEC) Yes Ar Automatic Reconciliation   metFORMIN (GLUCOPHAGE-XR) 500 MG extended release tablet TAKE 2 TABLETS BY MOUTH EVERY

## 2022-09-06 NOTE — PATIENT INSTRUCTIONS
Personalized Preventive Plan for Meng Haynes - 9/6/2022  Medicare offers a range of preventive health benefits. Some of the tests and screenings are paid in full while other may be subject to a deductible, co-insurance, and/or copay. Some of these benefits include a comprehensive review of your medical history including lifestyle, illnesses that may run in your family, and various assessments and screenings as appropriate. After reviewing your medical record and screening and assessments performed today your provider may have ordered immunizations, labs, imaging, and/or referrals for you. A list of these orders (if applicable) as well as your Preventive Care list are included within your After Visit Summary for your review. Other Preventive Recommendations:    A preventive eye exam performed by an eye specialist is recommended every 1-2 years to screen for glaucoma; cataracts, macular degeneration, and other eye disorders. A preventive dental visit is recommended every 6 months. Try to get at least 150 minutes of exercise per week or 10,000 steps per day on a pedometer . Order or download the FREE \"Exercise & Physical Activity: Your Everyday Guide\" from [employer]. Call 6-519.167.9417 or search [employer] online. You need 1010-5567 mg of calcium and 3803-6236 IU of vitamin D per day. It is possible to meet your calcium requirement with diet alone, but a vitamin D supplement is usually necessary to meet this goal.  When exposed to the sun, use a sunscreen that protects against both UVA and UVB radiation with an SPF of 30 or greater. Reapply every 2 to 3 hours or after sweating, drying off with a towel, or swimming. Always wear a seat belt when traveling in a car. Always wear a helmet when riding a bicycle or motorcycle.

## 2022-10-20 DIAGNOSIS — E55.9 VITAMIN D DEFICIENCY, UNSPECIFIED: ICD-10-CM

## 2022-10-20 RX ORDER — ERGOCALCIFEROL 1.25 MG/1
CAPSULE ORAL
Qty: 12 CAPSULE | Refills: 3 | Status: SHIPPED | OUTPATIENT
Start: 2022-10-20

## 2022-11-07 NOTE — PROGRESS NOTES
[address] [facility] [address] [place] [address], [address] [place] [address]  [phone]    Patient Name:  Vivia Severs  YOB: 1950      Office Visit 11/8/2022    The patient is seen by synchronous (real-time) audio-video technology on [facility]. me. Consent:  The patient/healthcare decision maker is aware that this patient-initiated Telehealth encounter is a billable service, with coverage as determined by his insurance carrier. The patient is aware that he/she may receive a bill and has provided verbal consent to proceed: Yes     I was at the office while conducting this visit. We discussed the expected course, resolution and complications of the diagnosis(es) in detail. Medication risks, benefits, costs, interactions, and alternatives were discussed as indicated. I advised him to contact the office if his condition worsens, changes or fails to improve as anticipated. He expressed understanding with the diagnosis(es) and plan. Pursuant to the emergency declaration under the [address] [place] [address], [facility] waiver authority and the [employer] Act, this Virtual  Visit was conducted, with patient's consent, to reduce the patient's risk of exposure to COVID-19 and provide continuity of care for an established patient. Services were provided through a video synchronous discussion virtually to substitute for in-person clinic visit. CHIEF COMPLAINT:    Chief Complaint   Patient presents with    CPAP/BiPAP     PRESSURE CHANGE 7-15    Sleep Apnea         HISTORY OF PRESENT ILLNESS:      The patient returns today in virtual follow-up of obstructive sleep apnea. At her last visit, pressure change was made going to APAP at 7-15 cmH2O with an EPR of 2. Patient's download today indicates 95% compliance over the past year. Average usage is running about 11 hours and her AHI is excellent at 2.0.   She does have a rare significant leak but otherwise is doing well. Maximum pressure achieved on her machine is 13.9 cm water. I will go ahead and renew her supplies today. We can see her back in a year. The patient's weight continues to be an issue but fortunately she has been able to lose about 30 pounds since her last visit. She is now down to 264 pounds on her home scale. She was put on Ozempic by her PCP and this is seem to help. She is also limiting portions and is congratulated on her achievement. The patient's Traver score today is improved at 2/24. Her last visit was 6/24. She did have a TSH drawn earlier this year which was borderline high at 3.93. She is on Synthroid and her PCP is monitoring her TSH.         Component Ref Range & Units 8/30/22 1000    TSH, 3RD GENERATION 0.358 - 3.740 uIU/mL 3.930 High     Resulting Agency  [facility]        Sleep Medicine 11/8/2022 11/9/2021   Sitting and reading 0 0   Watching TV 0 2   Sitting, inactive in a public place (e.g. a theatre or a meeting) 0 0   As a passenger in a car for an hour without a break 0 2   Lying down to rest in the afternoon when circumstances permit 2 2   Sitting and talking to someone 0 0   Sitting quietly after a lunch without alcohol 0 0   In a car, while stopped for a few minutes in traffic 0 0   Traver Sleepiness Score 2 6               Past Medical History:   Diagnosis Date    Acid reflux     Allergic rhinitis, cause unspecified 2/8/2013    Arthritis     OA- hips and knees    Back pain 2/8/2013    Basal cell carcinoma of cheek 2/8/2013    Elevated blood uric acid level 2/12/2015    Hyperlipidemia     Hypertension     Malaise and fatigue 2/8/2013    Morbid obesity ([facility] [address] 9/11/13    BMI 43.3    Personal history of anxiety disorder 2/12/2015    Sleep apnea 06/01/2015    Unspecified hypothyroidism 2/12/2015         [unfilled]      Past Surgical History:   Procedure Laterality Date    BREAST REDUCTION SURGERY  9/17/2013    BREAST REDUCTION performed by Sammi Giles MD at [facility] [address]      gell injected into knee    OTHER SURGICAL HISTORY      ganglion cyst removed right wrist     DAPHNIE AND BSO (CERVIX REMOVED)         [unfilled]        Social History     Socioeconomic History    Marital status:      Spouse name: Not on file    Number of children: Not on file    Years of education: Not on file    Highest education level: Not on file   Occupational History    Not on file   Tobacco Use    Smoking status: Former     Packs/day: 0.50     Types: Cigarettes     Quit date: 2005     Years since quittin.7    Smokeless tobacco: Never   Vaping Use    Vaping Use: Never used   Substance and Sexual Activity    Alcohol use: No    Drug use: No    Sexual activity: Not on file   Other Topics Concern    Not on file   Social History Narrative    Not on file     Social Determinants of Health     Financial Resource Strain: Not on file   Food Insecurity: Not on file   Transportation Needs: Not on file   Physical Activity: Inactive    Days of Exercise per Week: 0 days    Minutes of Exercise per Session: 0 min   Stress: Not on file   Social Connections: Not on file   Intimate Partner Violence: Not on file   Housing Stability: Not on file         Family History   Problem Relation Age of Onset    Lung Disease Brother     Cancer Neg Hx     Diabetes Mother     Hypertension Mother     COPD Mother          No Known Allergies      Current Outpatient Medications   Medication Sig    vitamin D (ERGOCALCIFEROL) 1.25 MG (37292 UT) CAPS capsule TAKE ONE CAPSULE BY MOUTH EVERY SEVEN DAYS    montelukast (SINGULAIR) 10 MG tablet TAKE 1 TABLET BY MOUTH DAILY FOR ALLERGIES    omeprazole (PRILOSEC) 20 MG delayed release capsule TAKE 1 CAPSULE BY MOUTH EVERY MORNING FOR REFLUX    meloxicam (MOBIC) 15 MG tablet TAKE 1 TABLET BY MOUTH EVERY DAY FOR PAIN AND INFLAMMATION    Semaglutide,0.25 or 0.5MG/DOS, 2 MG/1.5ML SOPN Give 0.5 mg SQ in abdomen, thigh, or upper arm; rotate inj sites Q week    Semaglutide,0.25 or 0.5MG/DOS, 2 MG/1.5ML SOPN Give 0.25 mg SQ in abdomen, thigh, or upper arm; rotate inj sites Q week for 4 weeks then increase to 0.5 mg qweek    albuterol sulfate HFA (PROVENTIL;VENTOLIN;PROAIR) 108 (90 Base) MCG/ACT inhaler Inhale 2 puffs into the lungs every 6 hours as needed for Wheezing    FLUoxetine (PROZAC) 20 MG capsule TAKE 1 TO 2 CAPSULES BY MOUTH EVERY MORNING FOR MOOD    allopurinol (ZYLOPRIM) 100 MG tablet TAKE 1 TABLET BY MOUTH EVERY DAY    lisinopril-hydroCHLOROthiazide (PRINZIDE;ZESTORETIC) 20-12.5 MG per tablet TAKE 1 TABLET BY MOUTH EVERY DAY    levothyroxine (SYNTHROID) 88 MCG tablet TAKE 1 TABLET BY MOUTH EVERY DAY BEFORE BREAKFAST    atorvastatin (LIPITOR) 20 MG tablet TAKE 1 TABLET BY MOUTH AT BEDTIME FOR CHOLESTEROL    azelastine HCl 0.15 % SOLN 2 sprays by Nasal route 2 times daily    Cholecalciferol 25 MCG/0.03ML LIQD Take by mouth    cyanocobalamin 1000 MCG/ML injection 1 ml IM q week for 8 weeks then 1 ml month Dx: pernicious anemia Indications: anemia due to vitamin B12 deficiency-pernicious anemia    furosemide (LASIX) 20 MG tablet TAKE 1 TO 2 TABLETS BY MOUTH EVERY MORNING    levocetirizine (XYZAL) 5 MG tablet TAKE 1 TAB BY MOUTH DAILY. FOR ALLERGIES ( INSTEAD OF ZYRTEC)    metFORMIN (GLUCOPHAGE-XR) 500 MG extended release tablet TAKE 2 TABLETS BY MOUTH EVERY DAY AT HEAVIEST MEAL    metoprolol succinate (TOPROL XL) 100 MG extended release tablet Take 100 mg by mouth daily    potassium chloride (KLOR-CON M) 10 MEQ extended release tablet 1 po every day for potassium with furosemide     No current facility-administered medications for this visit. REVIEW OF SYSTEMS:   CONSTITUTIONAL:   There is no history of fever, chills, night sweats, weight loss, weight gain, persistent fatigue, or lethargy/hypersomnolence.    CARDIAC:   No chest pain, pressure, discomfort, palpitations, orthopnea, murmurs, or edema.   GI:   No dysphagia, heartburn reflux, nausea/vomiting, diarrhea, abdominal pain, or bleeding. NEURO:   There is no history of AMS, persistent headache, decreased level of consciousness, seizures, or motor or sensory deficits. PHYSICAL EXAM:    There were no vitals filed for this visit. GENERAL APPEARANCE:   The patient is overweight and in no respiratory distress. HEENT:   PERRL. Conjunctivae unremarkable. No cranial nerve deficits are evident. NECK/LYMPHATIC:   Symmetrical with no elevation of jugular venous pulsation. Trachea midline. No obvious thyroid enlargement. LUNGS:   Normal respiratory effort with symmetrical lung expansion. Breath sounds are without audible wheezing or congestion. SKIN:  No cyanosis, jaundice, or rashes are evident. NEURO:   The patient is alert and oriented to person, place, and time. Memory appears intact and mood is normal.  No gross sensorimotor deficits are present. ASSESSMENT:  (Medical Decision Making)       ICD-10-CM    1. Sleep apnea, obstructive  G47.33 The patient sleep apnea is very well controlled at this time. No adjustment in her settings is needed. I will renew her supplies today and we will plan to see her back in a year. She continues to benefit from therapy. 2. Class 3 severe obesity with serious comorbidity and body mass index (BMI) of 45.0 to 49.9 in adult, unspecified obesity type ([facility] [address]  E66.01 Patient is lost about 30 pounds since her last visit. This seems to have coincided with her starting Ozempic. She is doing well and is also limiting portions. She will continue efforts to lose more weight. T14.85            PLAN:    Continue APAP as currently prescribed. New supplies are ordered today. Continue efforts at weight loss as above. Follow-up will be in 1 year.       Orders Placed This Encounter   Procedures    DME - [address] [place]  Phone: [address] [phone]  Dept: [phone]      Patient Name: Peter Merida  : 1950  Gender: female  Address: [address] [address]  Patient phone: [phone] (home)       Primary Insurance: Payor: Zohra Danielle / Plan: MEDICARE PART A AND B / Product Type: *No Product type* /   Subscriber ID: [id number] - (Medicare)      AMB Supply Order  Order Details     DME Location: [facility]   Order Date: 2022   The primary encounter diagnosis was Sleep apnea, obstructive. A diagnosis of Class 3 severe obesity with serious comorbidity and body mass index (BMI) of 45.0 to 49.9 in adult, unspecified obesity type [facility]) was also pertinent to this visit.              (  X   )Supplies Needed        Machine   (     ) CPAP Unit  (     ) Auto CPAP Unit  (     ) BiLevel Unit  (     ) Auto BiLevel Unit  (     ) ASV   (     ) Bilevel ST      Length of need: 12 months    Pressure: 7-15 cmH20  EPR: 2     Starting Ramp Pressure:  7 cm H20  Ramp Time: min N/A    Patient had a diagnostic Apnea Hypopnea Index (AHI) of : 14.8  *SUPPLIES* Replace all as needed, or per coverage guidelines     Masks Type:  ( x   ) -Full Face Mask (1 per 3 mon)  (  x  ) -Full Mask (1 per month) Interface/Cushion      (  ) -Nasal Mask (1 per 3 mon)  (  ) - Nasal Mask (1 per month) Interface/Cushion  (     ) -Pillow (2 per mon)  (     ) -Luzqikagu (1 per 6 mon)            Other Supplies:    (   X  )-Bzwjxtdk (1 per 6 mon)  (   X  )-Nqotrl Tubing (1 per 3 mon)  (   X  )- Disposable Filter (2 per mon)  (   x  )-Fsgacx Humidifier (1 per year)     ( x    )-Qpcteefgw (sometimes used with Full Face Mask) (1 per 6 mos)  (    )-Tubing-without heat (1 per 3 mos)  (     )-Non-Disposable Filter (1 per 6 mos)  (  x   )-Water Chamber (1 per 6 mos)  (     )-Humidifier non-heated (1 per 5 yrs)      Signed Date: 11/8/2022  Electronically Signed By: Michelle Hilton MD  Electronically Dated:  11/8/2022        No orders of the defined types were placed in this encounter. Michelle Hilton MD  Electronically signed    Over 50% of today's office visit was spent in face to face time reviewing test results, prognosis, importance of compliance, education about disease process, benefits of medications, instructions for management of acute flare-ups, and follow up plans. Total face to face time spent with the patient and charting was 19 minutes. Dictated using voice recognition software.   Proof read but unrecognized errors may exist.

## 2022-11-08 ENCOUNTER — TELEMEDICINE (OUTPATIENT)
Dept: SLEEP MEDICINE | Age: 72
End: 2022-11-08
Payer: MEDICARE

## 2022-11-08 DIAGNOSIS — E66.01 CLASS 3 SEVERE OBESITY WITH SERIOUS COMORBIDITY AND BODY MASS INDEX (BMI) OF 45.0 TO 49.9 IN ADULT, UNSPECIFIED OBESITY TYPE (HCC): ICD-10-CM

## 2022-11-08 DIAGNOSIS — G47.33 SLEEP APNEA, OBSTRUCTIVE: Primary | ICD-10-CM

## 2022-11-08 PROCEDURE — 99214 OFFICE O/P EST MOD 30 MIN: CPT | Performed by: INTERNAL MEDICINE

## 2022-11-08 PROCEDURE — 3017F COLORECTAL CA SCREEN DOC REV: CPT | Performed by: INTERNAL MEDICINE

## 2022-11-08 PROCEDURE — G8399 PT W/DXA RESULTS DOCUMENT: HCPCS | Performed by: INTERNAL MEDICINE

## 2022-11-08 PROCEDURE — G8427 DOCREV CUR MEDS BY ELIG CLIN: HCPCS | Performed by: INTERNAL MEDICINE

## 2022-11-08 PROCEDURE — 1090F PRES/ABSN URINE INCON ASSESS: CPT | Performed by: INTERNAL MEDICINE

## 2022-11-08 PROCEDURE — 1123F ACP DISCUSS/DSCN MKR DOCD: CPT | Performed by: INTERNAL MEDICINE

## 2022-11-08 ASSESSMENT — SLEEP AND FATIGUE QUESTIONNAIRES
HOW LIKELY ARE YOU TO NOD OFF OR FALL ASLEEP WHILE SITTING AND READING: 0
HOW LIKELY ARE YOU TO NOD OFF OR FALL ASLEEP WHILE WATCHING TV: 0
HOW LIKELY ARE YOU TO NOD OFF OR FALL ASLEEP WHILE LYING DOWN TO REST IN THE AFTERNOON WHEN CIRCUMSTANCES PERMIT: 2
HOW LIKELY ARE YOU TO NOD OFF OR FALL ASLEEP WHILE SITTING AND TALKING TO SOMEONE: 0
HOW LIKELY ARE YOU TO NOD OFF OR FALL ASLEEP WHILE SITTING QUIETLY AFTER LUNCH WITHOUT ALCOHOL: 0
ESS TOTAL SCORE: 2
HOW LIKELY ARE YOU TO NOD OFF OR FALL ASLEEP WHILE SITTING INACTIVE IN A PUBLIC PLACE: 0
HOW LIKELY ARE YOU TO NOD OFF OR FALL ASLEEP IN A CAR, WHILE STOPPED FOR A FEW MINUTES IN TRAFFIC: 0
HOW LIKELY ARE YOU TO NOD OFF OR FALL ASLEEP WHEN YOU ARE A PASSENGER IN A CAR FOR AN HOUR WITHOUT A BREAK: 0

## 2022-11-12 DIAGNOSIS — I10 ESSENTIAL (PRIMARY) HYPERTENSION: ICD-10-CM

## 2022-11-12 DIAGNOSIS — R00.2 PALPITATIONS: ICD-10-CM

## 2022-11-14 RX ORDER — METOPROLOL SUCCINATE 100 MG/1
TABLET, EXTENDED RELEASE ORAL
Qty: 90 TABLET | Refills: 1 | Status: SHIPPED | OUTPATIENT
Start: 2022-11-14

## 2022-11-16 DIAGNOSIS — J30.1 ALLERGIC RHINITIS DUE TO POLLEN: ICD-10-CM

## 2022-11-16 RX ORDER — AZELASTINE HCL 205.5 UG/1
SPRAY NASAL
Qty: 30 ML | Refills: 5 | Status: SHIPPED | OUTPATIENT
Start: 2022-11-16

## 2022-11-16 NOTE — TELEPHONE ENCOUNTER
Prescription(s) refilled  It (they) has been escribed to the pharmacy. Requested Prescriptions     Signed Prescriptions Disp Refills    azelastine HCl 0.15 % SOLN 30 mL 5     Sig: SPRAY 2 SPRAYS INTO EACH NOSTRIL TWICE A DAY     Authorizing Provider: JOSEFINA JORDAN     No orders of the defined types were placed in this encounter. ICD-10-CM    1.  Allergic rhinitis due to pollen  J30.1 azelastine HCl 0.15 % SOLN

## 2022-11-17 DIAGNOSIS — E78.5 HYPERLIPIDEMIA, UNSPECIFIED: ICD-10-CM

## 2022-11-17 RX ORDER — ATORVASTATIN CALCIUM 20 MG/1
TABLET, FILM COATED ORAL
Qty: 90 TABLET | Refills: 3 | Status: SHIPPED | OUTPATIENT
Start: 2022-11-17

## 2022-12-07 ENCOUNTER — NURSE ONLY (OUTPATIENT)
Dept: FAMILY MEDICINE CLINIC | Facility: CLINIC | Age: 72
End: 2022-12-07

## 2022-12-07 DIAGNOSIS — E11.9 TYPE 2 DIABETES MELLITUS WITHOUT COMPLICATION, WITHOUT LONG-TERM CURRENT USE OF INSULIN (HCC): ICD-10-CM

## 2022-12-07 DIAGNOSIS — E53.8 B12 DEFICIENCY: ICD-10-CM

## 2022-12-07 DIAGNOSIS — E55.9 VITAMIN D DEFICIENCY: ICD-10-CM

## 2022-12-07 DIAGNOSIS — E78.5 HYPERLIPIDEMIA, UNSPECIFIED HYPERLIPIDEMIA TYPE: ICD-10-CM

## 2022-12-07 DIAGNOSIS — Z11.59 NEED FOR HEPATITIS C SCREENING TEST: ICD-10-CM

## 2022-12-07 DIAGNOSIS — Z79.899 ENCOUNTER FOR LONG-TERM (CURRENT) USE OF MEDICATIONS: ICD-10-CM

## 2022-12-08 LAB
CHOLEST SERPL-MCNC: 137 MG/DL
CREAT UR-MCNC: 57 MG/DL
EST. AVERAGE GLUCOSE BLD GHB EST-MCNC: 111 MG/DL
HBA1C MFR BLD: 5.5 % (ref 4.8–5.6)
HDLC SERPL-MCNC: 48 MG/DL (ref 40–60)
HDLC SERPL: 2.9 {RATIO}
LDLC SERPL CALC-MCNC: 64.6 MG/DL
MAGNESIUM SERPL-MCNC: 2 MG/DL (ref 1.8–2.4)
MICROALBUMIN UR-MCNC: 0.87 MG/DL (ref 0–3)
MICROALBUMIN/CREAT UR-RTO: 15 MG/G (ref 0–30)
TRIGL SERPL-MCNC: 122 MG/DL (ref 35–150)
VIT B12 SERPL-MCNC: 1266 PG/ML (ref 193–986)
VLDLC SERPL CALC-MCNC: 24.4 MG/DL (ref 6–23)

## 2022-12-09 LAB
HCV AB S/CO SERPL IA: <0.1 S/CO RATIO (ref 0–0.9)
HCV AB SERPL QL IA: NORMAL

## 2022-12-11 LAB — 25(OH)D3 SERPL-MCNC: 88.6 NG/ML (ref 30–100)

## 2022-12-14 ENCOUNTER — OFFICE VISIT (OUTPATIENT)
Dept: FAMILY MEDICINE CLINIC | Facility: CLINIC | Age: 72
End: 2022-12-14
Payer: MEDICARE

## 2022-12-14 VITALS
HEIGHT: 63 IN | SYSTOLIC BLOOD PRESSURE: 121 MMHG | WEIGHT: 264 LBS | BODY MASS INDEX: 46.78 KG/M2 | OXYGEN SATURATION: 96 % | HEART RATE: 76 BPM | DIASTOLIC BLOOD PRESSURE: 71 MMHG | TEMPERATURE: 96.9 F | RESPIRATION RATE: 16 BRPM

## 2022-12-14 DIAGNOSIS — E78.5 HYPERLIPIDEMIA, UNSPECIFIED HYPERLIPIDEMIA TYPE: ICD-10-CM

## 2022-12-14 DIAGNOSIS — R60.9 EDEMA, UNSPECIFIED TYPE: ICD-10-CM

## 2022-12-14 DIAGNOSIS — Z12.31 ENCOUNTER FOR SCREENING MAMMOGRAM FOR MALIGNANT NEOPLASM OF BREAST: ICD-10-CM

## 2022-12-14 DIAGNOSIS — I10 ESSENTIAL HYPERTENSION: ICD-10-CM

## 2022-12-14 DIAGNOSIS — E11.9 TYPE 2 DIABETES MELLITUS WITHOUT COMPLICATION, WITHOUT LONG-TERM CURRENT USE OF INSULIN (HCC): Primary | ICD-10-CM

## 2022-12-14 PROCEDURE — 99213 OFFICE O/P EST LOW 20 MIN: CPT | Performed by: FAMILY MEDICINE

## 2022-12-14 PROCEDURE — G8399 PT W/DXA RESULTS DOCUMENT: HCPCS | Performed by: FAMILY MEDICINE

## 2022-12-14 PROCEDURE — 1036F TOBACCO NON-USER: CPT | Performed by: FAMILY MEDICINE

## 2022-12-14 PROCEDURE — G8417 CALC BMI ABV UP PARAM F/U: HCPCS | Performed by: FAMILY MEDICINE

## 2022-12-14 PROCEDURE — 3074F SYST BP LT 130 MM HG: CPT | Performed by: FAMILY MEDICINE

## 2022-12-14 PROCEDURE — G8484 FLU IMMUNIZE NO ADMIN: HCPCS | Performed by: FAMILY MEDICINE

## 2022-12-14 PROCEDURE — 3044F HG A1C LEVEL LT 7.0%: CPT | Performed by: FAMILY MEDICINE

## 2022-12-14 PROCEDURE — 2022F DILAT RTA XM EVC RTNOPTHY: CPT | Performed by: FAMILY MEDICINE

## 2022-12-14 PROCEDURE — 3078F DIAST BP <80 MM HG: CPT | Performed by: FAMILY MEDICINE

## 2022-12-14 PROCEDURE — 1090F PRES/ABSN URINE INCON ASSESS: CPT | Performed by: FAMILY MEDICINE

## 2022-12-14 PROCEDURE — 3017F COLORECTAL CA SCREEN DOC REV: CPT | Performed by: FAMILY MEDICINE

## 2022-12-14 PROCEDURE — G8427 DOCREV CUR MEDS BY ELIG CLIN: HCPCS | Performed by: FAMILY MEDICINE

## 2022-12-14 PROCEDURE — 1123F ACP DISCUSS/DSCN MKR DOCD: CPT | Performed by: FAMILY MEDICINE

## 2022-12-14 RX ORDER — METFORMIN HYDROCHLORIDE 500 MG/1
TABLET, EXTENDED RELEASE ORAL
Qty: 180 TABLET | Refills: 1 | Status: SHIPPED | OUTPATIENT
Start: 2022-12-14

## 2022-12-14 RX ORDER — SEMAGLUTIDE 1.34 MG/ML
INJECTION, SOLUTION SUBCUTANEOUS
Qty: 4 ML | Refills: 5 | Status: SHIPPED | OUTPATIENT
Start: 2022-12-14

## 2022-12-14 RX ORDER — FUROSEMIDE 20 MG/1
TABLET ORAL
Qty: 180 TABLET | Refills: 1 | Status: SHIPPED | OUTPATIENT
Start: 2022-12-14

## 2022-12-14 ASSESSMENT — PATIENT HEALTH QUESTIONNAIRE - PHQ9
SUM OF ALL RESPONSES TO PHQ QUESTIONS 1-9: 0
SUM OF ALL RESPONSES TO PHQ QUESTIONS 1-9: 0
2. FEELING DOWN, DEPRESSED OR HOPELESS: 0
SUM OF ALL RESPONSES TO PHQ9 QUESTIONS 1 & 2: 0
SUM OF ALL RESPONSES TO PHQ QUESTIONS 1-9: 0
SUM OF ALL RESPONSES TO PHQ QUESTIONS 1-9: 0
1. LITTLE INTEREST OR PLEASURE IN DOING THINGS: 0

## 2022-12-14 ASSESSMENT — ENCOUNTER SYMPTOMS
ORTHOPNEA: 0
SHORTNESS OF BREATH: 0
BLURRED VISION: 0
VISUAL CHANGE: 0

## 2022-12-14 NOTE — PROGRESS NOTES
HISTORY OF PRESENT ILLNESS  Chief Complaint   Patient presents with    Diabetes     Ozempic; pt feels this is working        Camden Shaffer; pt feels this is working . Knows that it has curbed her appetitie and not eating as much. Still on a reasonable diet. BP went up around thanksgiving. Has been doing pretty good for the most part. Lots of bruising on the forearms was taking a baby aspirin    Subjective:   Alonzo Covington is a 81 y.o. female with hypertension. Hypertension ROS: taking medications as instructed, no medication side effects noted, no TIA's, no chest pain on exertion, no dyspnea on exertion, no swelling of ankles.    Key CAD CHF Meds            furosemide (LASIX) 20 MG tablet (Taking)    Sig: TAKE 1 TO 2 TABLETS BY MOUTH EVERY MORNING    atorvastatin (LIPITOR) 20 MG tablet (Taking)    Sig: TAKE 1 TABLET BY MOUTH EVERY DAY AT BEDTIME FOR CHOLESTEROL    metoprolol succinate (TOPROL XL) 100 MG extended release tablet (Taking)    Sig: TAKE 1 TABLET BY MOUTH EVERY DAY    lisinopril-hydroCHLOROthiazide (PRINZIDE;ZESTORETIC) 20-12.5 MG per tablet (Taking)    Sig: TAKE 1 TABLET BY MOUTH EVERY DAY           Lab Results   Component Value Date/Time     08/30/2022 10:00 AM    K 3.4 08/30/2022 10:00 AM     08/30/2022 10:00 AM    CO2 31 08/30/2022 10:00 AM    BUN 10 08/30/2022 10:00 AM    CREATININE 1.00 08/30/2022 10:00 AM    GLUCOSE 117 08/30/2022 10:00 AM    CALCIUM 9.4 08/30/2022 10:00 AM       Lab Results   Component Value Date    CREATININE 1.00 08/30/2022      Lab Results   Component Value Date    CHOL 137 12/07/2022    CHOL 134 08/30/2022    CHOL 144 09/02/2021     Lab Results   Component Value Date    TRIG 122 12/07/2022    TRIG 132 08/30/2022    TRIG 123 09/02/2021     Lab Results   Component Value Date    HDL 48 12/07/2022    HDL 39 (L) 08/30/2022    HDL 49 09/02/2021     Lab Results   Component Value Date    LDLCALC 64.6 12/07/2022    LDLCALC 68.6 08/30/2022    LDLCALC 73 09/02/2021     Lab Results   Component Value Date    LABVLDL 24.4 (H) 12/07/2022    LABVLDL 26.4 (H) 08/30/2022    LABVLDL 29 08/28/2019    VLDL 22 09/02/2021    VLDL 29 08/31/2020     Lab Results   Component Value Date    CHOLHDLRATIO 2.9 12/07/2022    CHOLHDLRATIO 3.4 08/30/2022      Lab Results   Component Value Date    LABMICR 0.87 12/07/2022      BP Readings from Last 3 Encounters:   12/14/22 121/71   09/06/22 (!) 150/58   08/30/22 137/61        Worse with stress, salt. Improved with exercise, medication. She is  monitoring blood pressures. Wt Readings from Last 3 Encounters:   12/14/22 264 lb (119.7 kg)   09/06/22 281 lb (127.5 kg)   08/30/22 279 lb 12.8 oz (126.9 kg)        Diabetes  She presents for her follow-up diabetic visit. Pertinent negatives for hypoglycemia include no dizziness, headaches, nervousness/anxiousness or sweats. Associated symptoms include weight loss. Pertinent negatives for diabetes include no blurred vision, no chest pain, no fatigue, no foot paresthesias, no foot ulcerations, no polydipsia, no polyphagia, no polyuria, no visual change and no weakness. Symptoms are stable. Hypertension  This is a chronic problem. The current episode started more than 1 year ago. The problem is unchanged. Pertinent negatives include no anxiety, blurred vision, chest pain, headaches, malaise/fatigue, neck pain, orthopnea, palpitations, peripheral edema, PND, shortness of breath or sweats. Review of Systems   Constitutional:  Positive for weight loss. Negative for activity change, appetite change, chills, fatigue, fever and malaise/fatigue. HENT:  Negative for congestion and rhinorrhea. Eyes:  Negative for blurred vision. Respiratory:  Negative for cough, shortness of breath and wheezing. Cardiovascular:  Negative for chest pain, palpitations, orthopnea and PND. Gastrointestinal:  Negative for abdominal pain, constipation, diarrhea, nausea and vomiting.    Endocrine: Negative for polydipsia, polyphagia and polyuria. Genitourinary:  Negative for dysuria. Musculoskeletal:  Negative for arthralgias, myalgias and neck pain. Neurological:  Negative for dizziness, weakness and headaches. Hematological:  Bruises/bleeds easily. Psychiatric/Behavioral:  Negative for dysphoric mood. The patient is not nervous/anxious. Patient Active Problem List   Diagnosis    Health care maintenance    AN (stress urinary incontinence, female)    Type 2 diabetes mellitus ([facility])    Vitamin D deficiency    Personal history of anxiety disorder    Hyperlipidemia    Allergic rhinitis    Basal cell carcinoma of cheek    Urge incontinence of urine    Elevated blood uric acid level    Arthritis    Class 3 severe obesity with serious comorbidity and body mass index (BMI) of 45.0 to 49.9 in adult ([facility])    Acid reflux    Sleep apnea, obstructive    B12 deficiency    Back pain    Essential hypertension    Hypothyroidism    Malaise and fatigue    Chronic renal disease, stage III ([facility]) [[id number]]         Blood pressure 121/71, pulse 76, temperature 96.9 °F (36.1 °C), temperature source Temporal, resp. rate 16, height 5' 3\" (1.6 m), weight 264 lb (119.7 kg), SpO2 96 %, not currently breastfeeding. Pain Scale: 0 - No pain/10 Pain Location:   Body mass index is 46.77 kg/m². Physical Exam  Vitals and nursing note reviewed. Constitutional:       General: She is not in acute distress. Appearance: Normal appearance. She is normal weight. She is not toxic-appearing. HENT:      Head: Normocephalic and atraumatic. Eyes:      General: Lids are normal.      Extraocular Movements: Extraocular movements intact. Conjunctiva/sclera: Conjunctivae normal.      Pupils: Pupils are equal.   Cardiovascular:      Rate and Rhythm: Normal rate and regular rhythm. Heart sounds: Normal heart sounds. No murmur heard. No friction rub. No gallop.    Pulmonary:      Effort: Pulmonary effort is normal. No respiratory distress. Breath sounds: Normal breath sounds. No wheezing or rales. Musculoskeletal:      Right lower leg: No edema. Left lower leg: No edema. Skin:     General: Skin is warm and dry. Neurological:      General: No focal deficit present. Mental Status: She is alert. Psychiatric:         Mood and Affect: Mood normal.         Behavior: Behavior normal.         Thought Content: Thought content normal.         Judgment: Judgment normal.            ASSESSMENT and PLAN   Diagnosis Orders   1. Type 2 diabetes mellitus without complication, without long-term current use of insulin (HCC)  metFORMIN (GLUCOPHAGE-XR) 500 MG extended release tablet    Semaglutide, 1 MG/DOSE, (OZEMPIC, 1 MG/DOSE,) 4 MG/3ML SOPN      2. Encounter for screening mammogram for malignant neoplasm of breast  BRIANNA DIGITAL SCREEN W OR WO CAD BILATERAL      3. Essential hypertension        4. Hyperlipidemia, unspecified hyperlipidemia type        5. Edema, unspecified type  furosemide (LASIX) 20 MG tablet          Reviewed medications and side effects in detail    Patient is asked to monitor BP at home or work, several times per month and return with written values at next office visit. Her other chronic conditions are stable at this time. She is stable on the current treatment and tolerating it well. No significant sides effects. Will not adjust therapy at this time, unless noted above. We will continue to monitor for any problems. Medications refilled and lab work has been ordered where needed. Reviewed medications are explained including any potential interactions or side effects in detail. The patient's questions were answered. She  understands the above and has no further questions. Further workup and treatment should be done if symptoms persist, worsen or new symptoms occur. She  will call to notify us of any problems, complications or worsening symptoms.     Follow-up and Dispositions    Return in about 3 months (around 3/14/2023). There are no Patient Instructions on file for this visit. (Some details in this note may have been created with speech-recognition software. Some errors in speech recognition may have occurred.    Most of those have been corrected but some may have been missed.)         Gareth French MD  [address] [facility]  Phone [phone]

## 2022-12-20 ASSESSMENT — ENCOUNTER SYMPTOMS
WHEEZING: 0
CONSTIPATION: 0
NAUSEA: 0
RHINORRHEA: 0
VOMITING: 0
ABDOMINAL PAIN: 0
DIARRHEA: 0
COUGH: 0

## 2023-01-15 DIAGNOSIS — E03.9 ACQUIRED HYPOTHYROIDISM: ICD-10-CM

## 2023-01-16 RX ORDER — LEVOTHYROXINE SODIUM 88 UG/1
TABLET ORAL
Qty: 90 TABLET | Refills: 1 | Status: SHIPPED | OUTPATIENT
Start: 2023-01-16

## 2023-02-11 DIAGNOSIS — F41.1 GAD (GENERALIZED ANXIETY DISORDER): ICD-10-CM

## 2023-02-13 RX ORDER — FLUOXETINE HYDROCHLORIDE 20 MG/1
CAPSULE ORAL
Qty: 180 CAPSULE | Refills: 1 | Status: SHIPPED | OUTPATIENT
Start: 2023-02-13

## 2023-03-03 DIAGNOSIS — I10 ESSENTIAL HYPERTENSION: ICD-10-CM

## 2023-03-03 RX ORDER — LISINOPRIL AND HYDROCHLOROTHIAZIDE 20; 12.5 MG/1; MG/1
TABLET ORAL
Qty: 90 TABLET | Refills: 1 | Status: SHIPPED | OUTPATIENT
Start: 2023-03-03

## 2023-03-09 DIAGNOSIS — M1A.00X0 IDIOPATHIC CHRONIC GOUT WITHOUT TOPHUS, UNSPECIFIED SITE: ICD-10-CM

## 2023-03-09 DIAGNOSIS — M19.90 ARTHRITIS: ICD-10-CM

## 2023-03-10 RX ORDER — MELOXICAM 15 MG/1
TABLET ORAL
Qty: 90 TABLET | Refills: 1 | Status: SHIPPED | OUTPATIENT
Start: 2023-03-10

## 2023-03-10 RX ORDER — ALLOPURINOL 100 MG/1
TABLET ORAL
Qty: 90 TABLET | Refills: 1 | Status: SHIPPED | OUTPATIENT
Start: 2023-03-10

## 2023-03-13 SDOH — ECONOMIC STABILITY: TRANSPORTATION INSECURITY
IN THE PAST 12 MONTHS, HAS LACK OF TRANSPORTATION KEPT YOU FROM MEETINGS, WORK, OR FROM GETTING THINGS NEEDED FOR DAILY LIVING?: NO

## 2023-03-13 SDOH — ECONOMIC STABILITY: HOUSING INSECURITY
IN THE LAST 12 MONTHS, WAS THERE A TIME WHEN YOU DID NOT HAVE A STEADY PLACE TO SLEEP OR SLEPT IN A SHELTER (INCLUDING NOW)?: NO

## 2023-03-13 SDOH — ECONOMIC STABILITY: FOOD INSECURITY: WITHIN THE PAST 12 MONTHS, THE FOOD YOU BOUGHT JUST DIDN'T LAST AND YOU DIDN'T HAVE MONEY TO GET MORE.: NEVER TRUE

## 2023-03-13 SDOH — ECONOMIC STABILITY: INCOME INSECURITY: HOW HARD IS IT FOR YOU TO PAY FOR THE VERY BASICS LIKE FOOD, HOUSING, MEDICAL CARE, AND HEATING?: NOT VERY HARD

## 2023-03-13 SDOH — ECONOMIC STABILITY: FOOD INSECURITY: WITHIN THE PAST 12 MONTHS, YOU WORRIED THAT YOUR FOOD WOULD RUN OUT BEFORE YOU GOT MONEY TO BUY MORE.: NEVER TRUE

## 2023-03-13 ASSESSMENT — ENCOUNTER SYMPTOMS
VISUAL CHANGE: 0
SHORTNESS OF BREATH: 0
BLURRED VISION: 0
ORTHOPNEA: 0

## 2023-03-14 ENCOUNTER — OFFICE VISIT (OUTPATIENT)
Dept: FAMILY MEDICINE CLINIC | Facility: CLINIC | Age: 73
End: 2023-03-14
Payer: MEDICARE

## 2023-03-14 VITALS
TEMPERATURE: 96.8 F | OXYGEN SATURATION: 96 % | RESPIRATION RATE: 16 BRPM | DIASTOLIC BLOOD PRESSURE: 56 MMHG | HEIGHT: 63 IN | WEIGHT: 247 LBS | SYSTOLIC BLOOD PRESSURE: 124 MMHG | BODY MASS INDEX: 43.77 KG/M2 | HEART RATE: 81 BPM

## 2023-03-14 DIAGNOSIS — M1A.00X0 IDIOPATHIC CHRONIC GOUT WITHOUT TOPHUS, UNSPECIFIED SITE: ICD-10-CM

## 2023-03-14 DIAGNOSIS — E11.9 TYPE 2 DIABETES MELLITUS WITHOUT COMPLICATION, WITHOUT LONG-TERM CURRENT USE OF INSULIN (HCC): ICD-10-CM

## 2023-03-14 DIAGNOSIS — E66.01 OBESITY, CLASS III, BMI 40-49.9 (MORBID OBESITY) (HCC): ICD-10-CM

## 2023-03-14 DIAGNOSIS — M19.90 ARTHRITIS: ICD-10-CM

## 2023-03-14 DIAGNOSIS — J30.1 NON-SEASONAL ALLERGIC RHINITIS DUE TO POLLEN: ICD-10-CM

## 2023-03-14 DIAGNOSIS — R60.9 EDEMA, UNSPECIFIED TYPE: ICD-10-CM

## 2023-03-14 DIAGNOSIS — I10 ESSENTIAL HYPERTENSION: Primary | ICD-10-CM

## 2023-03-14 DIAGNOSIS — R63.4 WEIGHT LOSS: ICD-10-CM

## 2023-03-14 DIAGNOSIS — N18.30 STAGE 3 CHRONIC KIDNEY DISEASE, UNSPECIFIED WHETHER STAGE 3A OR 3B CKD (HCC): ICD-10-CM

## 2023-03-14 DIAGNOSIS — E78.5 HYPERLIPIDEMIA, UNSPECIFIED HYPERLIPIDEMIA TYPE: ICD-10-CM

## 2023-03-14 DIAGNOSIS — N63.32 MASS OF AXILLARY TAIL OF LEFT BREAST: ICD-10-CM

## 2023-03-14 PROCEDURE — 3078F DIAST BP <80 MM HG: CPT | Performed by: FAMILY MEDICINE

## 2023-03-14 PROCEDURE — 2022F DILAT RTA XM EVC RTNOPTHY: CPT | Performed by: FAMILY MEDICINE

## 2023-03-14 PROCEDURE — 3074F SYST BP LT 130 MM HG: CPT | Performed by: FAMILY MEDICINE

## 2023-03-14 PROCEDURE — 1123F ACP DISCUSS/DSCN MKR DOCD: CPT | Performed by: FAMILY MEDICINE

## 2023-03-14 PROCEDURE — 3017F COLORECTAL CA SCREEN DOC REV: CPT | Performed by: FAMILY MEDICINE

## 2023-03-14 PROCEDURE — G8484 FLU IMMUNIZE NO ADMIN: HCPCS | Performed by: FAMILY MEDICINE

## 2023-03-14 PROCEDURE — 99214 OFFICE O/P EST MOD 30 MIN: CPT | Performed by: FAMILY MEDICINE

## 2023-03-14 PROCEDURE — G8417 CALC BMI ABV UP PARAM F/U: HCPCS | Performed by: FAMILY MEDICINE

## 2023-03-14 PROCEDURE — 3046F HEMOGLOBIN A1C LEVEL >9.0%: CPT | Performed by: FAMILY MEDICINE

## 2023-03-14 PROCEDURE — G8399 PT W/DXA RESULTS DOCUMENT: HCPCS | Performed by: FAMILY MEDICINE

## 2023-03-14 PROCEDURE — G8427 DOCREV CUR MEDS BY ELIG CLIN: HCPCS | Performed by: FAMILY MEDICINE

## 2023-03-14 PROCEDURE — 1090F PRES/ABSN URINE INCON ASSESS: CPT | Performed by: FAMILY MEDICINE

## 2023-03-14 PROCEDURE — 1036F TOBACCO NON-USER: CPT | Performed by: FAMILY MEDICINE

## 2023-03-14 RX ORDER — METFORMIN HYDROCHLORIDE 500 MG/1
TABLET, EXTENDED RELEASE ORAL
Qty: 180 TABLET | Refills: 1 | Status: SHIPPED | OUTPATIENT
Start: 2023-03-14

## 2023-03-14 RX ORDER — ALLOPURINOL 100 MG/1
TABLET ORAL
Qty: 90 TABLET | Refills: 1 | Status: SHIPPED | OUTPATIENT
Start: 2023-03-14

## 2023-03-14 RX ORDER — MELOXICAM 15 MG/1
TABLET ORAL
Qty: 90 TABLET | Refills: 1 | Status: SHIPPED | OUTPATIENT
Start: 2023-03-14

## 2023-03-14 RX ORDER — SEMAGLUTIDE 1.34 MG/ML
INJECTION, SOLUTION SUBCUTANEOUS
Qty: 4 ML | Refills: 5 | Status: SHIPPED | OUTPATIENT
Start: 2023-03-14

## 2023-03-14 RX ORDER — FUROSEMIDE 20 MG/1
TABLET ORAL
Qty: 180 TABLET | Refills: 1 | Status: SHIPPED | OUTPATIENT
Start: 2023-03-14

## 2023-03-14 RX ORDER — LEVOCETIRIZINE DIHYDROCHLORIDE 5 MG/1
TABLET, FILM COATED ORAL
Qty: 90 TABLET | Refills: 3 | Status: SHIPPED | OUTPATIENT
Start: 2023-03-14

## 2023-03-14 ASSESSMENT — PATIENT HEALTH QUESTIONNAIRE - PHQ9
SUM OF ALL RESPONSES TO PHQ QUESTIONS 1-9: 0
SUM OF ALL RESPONSES TO PHQ9 QUESTIONS 1 & 2: 0
1. LITTLE INTEREST OR PLEASURE IN DOING THINGS: 0
2. FEELING DOWN, DEPRESSED OR HOPELESS: 0
SUM OF ALL RESPONSES TO PHQ QUESTIONS 1-9: 0

## 2023-03-14 NOTE — PROGRESS NOTES
HISTORY OF PRESENT ILLNESS  Chief Complaint   Patient presents with    Diabetes       Got down to 245 highest was 295. Breathing and everything seems to be a lot better since she lost weight. Diabetes Mellitus: Patient presents for follow up of diabetes. Symptoms: taking medications as instructed, no medication side effects noted, no TIA's, no chest pain on exertion, no dyspnea on exertion, no swelling of ankles. Symptoms have well controlled. Patient denies chest pain and palpitations. She states she is compliant most of the time with her  medications. She  states she is compliant most of the time with her  diet. Evaluation to date has been included below. Home sugars: BGs consistently in an acceptable range. Treatment to date: medications. Diabetic issues reviewed with her: low cholesterol diet, weight control and daily exercise discussed.   Key Antihyperglycemic Medications            Semaglutide, 1 MG/DOSE, (OZEMPIC, 1 MG/DOSE,) 4 MG/3ML SOPN (Taking)    Sig: Use 1.0 mg SQ once weekly, rotate injection sites    metFORMIN (GLUCOPHAGE-XR) 500 MG extended release tablet (Taking)    Sig: TAKE 2 TABLETS BY MOUTH EVERY DAY AT HEAVIEST MEAL           Key CAD CHF Meds            furosemide (LASIX) 20 MG tablet (Taking)    Sig: TAKE 1 TO 2 TABLETS BY MOUTH EVERY MORNING    lisinopril-hydroCHLOROthiazide (PRINZIDE;ZESTORETIC) 20-12.5 MG per tablet (Taking)    Sig: TAKE 1 TABLET BY MOUTH EVERY DAY    atorvastatin (LIPITOR) 20 MG tablet (Taking)    Sig: TAKE 1 TABLET BY MOUTH EVERY DAY AT BEDTIME FOR CHOLESTEROL    metoprolol succinate (TOPROL XL) 100 MG extended release tablet (Taking)    Sig: TAKE 1 TABLET BY MOUTH EVERY DAY           Lab Results   Component Value Date    LABA1C 5.5 12/07/2022     Lab Results   Component Value Date     12/07/2022      Lab Results   Component Value Date    CREATININE 1.00 08/30/2022      Lab Results   Component Value Date    LABMICR 0.87 12/07/2022      Wt Readings from Last 3 Encounters:   03/14/23 247 lb (112 kg)   12/14/22 264 lb (119.7 kg)   09/06/22 281 lb (127.5 kg)      BP Readings from Last 3 Encounters:   03/14/23 (!) 124/56   12/14/22 121/71   09/06/22 (!) 150/58        Diabetes  She presents for her follow-up diabetic visit. Pertinent negatives for hypoglycemia include no dizziness, headaches, nervousness/anxiousness or sweats. Associated symptoms include weight loss. Pertinent negatives for diabetes include no blurred vision, no chest pain, no fatigue, no foot paresthesias, no foot ulcerations, no polydipsia, no polyphagia, no polyuria, no visual change and no weakness. Symptoms are stable. Hypertension  This is a chronic problem. The current episode started more than 1 year ago. The problem is unchanged. Pertinent negatives include no anxiety, blurred vision, chest pain, headaches, malaise/fatigue, neck pain, orthopnea, palpitations, peripheral edema, PND, shortness of breath or sweats. Review of Systems   Constitutional:  Positive for weight loss. Negative for fatigue and malaise/fatigue. Eyes:  Negative for blurred vision. Respiratory:  Negative for shortness of breath. Cardiovascular:  Negative for chest pain, palpitations, orthopnea and PND. Endocrine: Negative for polydipsia, polyphagia and polyuria. Musculoskeletal:  Negative for neck pain. Neurological:  Negative for dizziness, weakness and headaches. Psychiatric/Behavioral:  The patient is not nervous/anxious.        Patient Active Problem List   Diagnosis    Health care maintenance    AN (stress urinary incontinence, female)    Type 2 diabetes mellitus (HCC)    Vitamin D deficiency    Personal history of anxiety disorder    Hyperlipidemia    Allergic rhinitis    Basal cell carcinoma of cheek    Urge incontinence of urine    Elevated blood uric acid level    Arthritis    Class 3 severe obesity with serious comorbidity and body mass index (BMI) of 45.0 to 49.9 in adult [facility]) Acid reflux    Sleep apnea, obstructive    B12 deficiency    Back pain    Essential hypertension    Hypothyroidism    Malaise and fatigue    Chronic renal disease, stage III ([facility]) [074872]         Blood pressure (!) 124/56, pulse 81, temperature 96.8 °F (36 °C), temperature source Temporal, resp. rate 16, height 5' 3\" (1.6 m), weight 247 lb (112 kg), SpO2 96 %, not currently breastfeeding. Pain Scale: 0 - No pain/10 Pain Location:   Body mass index is 43.75 kg/m². Physical Exam  Vitals and nursing note reviewed. Constitutional:       General: She is not in acute distress. Appearance: Normal appearance. She is normal weight. She is not toxic-appearing. HENT:      Head: Normocephalic and atraumatic. Eyes:      General: Lids are normal.      Extraocular Movements: Extraocular movements intact. Conjunctiva/sclera: Conjunctivae normal.      Pupils: Pupils are equal.   Cardiovascular:      Rate and Rhythm: Normal rate and regular rhythm. Heart sounds: Normal heart sounds. No murmur heard. No friction rub. No gallop. Pulmonary:      Effort: Pulmonary effort is normal. No respiratory distress. Breath sounds: Normal breath sounds. No wheezing or rales. Musculoskeletal:      Right lower leg: No edema. Left lower leg: No edema. Lymphadenopathy:      Comments: Almost cystic feeling mass noted deep in the left axilla. Skin:     General: Skin is warm and dry. Neurological:      General: No focal deficit present. Mental Status: She is alert. Psychiatric:         Mood and Affect: Mood normal.         Behavior: Behavior normal.         Thought Content: Thought content normal.         Judgment: Judgment normal.            ASSESSMENT and PLAN   Diagnosis Orders   1. Essential hypertension  Comprehensive Metabolic Panel    CBC with Auto Differential      2. Idiopathic chronic gout without tophus, unspecified site  allopurinol (ZYLOPRIM) 100 MG tablet      3.  Arthritis  meloxicam (MOBIC) 15 MG tablet      4. Type 2 diabetes mellitus without complication, without long-term current use of insulin (HCC)  Semaglutide, 1 MG/DOSE, (OZEMPIC, 1 MG/DOSE,) 4 MG/3ML SOPN    metFORMIN (GLUCOPHAGE-XR) 500 MG extended release tablet    Hemoglobin A1C      5. Edema, unspecified type  furosemide (LASIX) 20 MG tablet      6. Non-seasonal allergic rhinitis due to pollen  levocetirizine (XYZAL) 5 MG tablet      7. Obesity, Class III, BMI 40-49.9 (morbid obesity) ([facility]        8. Stage 3 chronic kidney disease, unspecified whether stage 3a or 3b CKD ([facility]        9. Mass of axillary tail of left breast        10. Hyperlipidemia, unspecified hyperlipidemia type  Lipid Panel      11. Weight loss  TSH          Reviewed medications and side effects in detail    Call radiology about mammo to see if we need a diagnostic image. Usually goes to [facility] radiology. Has awe scheduled, lab appt 2-7d prior    Her other chronic conditions are stable at this time. She continues to be followed by her previous specialists for the above chronic issues. She is stable on the current treatment and tolerating it well. No significant sides effects. Will not adjust therapy at this time, unless noted above. We will continue to monitor for any problems. Medications refilled and lab work has been ordered where needed. Reviewed medications are explained including any potential interactions or side effects in detail. The patient's questions were answered. She  understands the above and has no further questions. Further workup and treatment should be done if symptoms persist, worsen or new symptoms occur. She  will call to notify us of any problems, complications or worsening symptoms. There are no Patient Instructions on file for this visit. (Some details in this note may have been created with speech-recognition software. Some errors in speech recognition may have occurred.    Most of those have been corrected but some may have been missed.)         Shelton Reno MD  [address] [place] [address]  Phone [phone]

## 2023-05-03 DIAGNOSIS — Z12.31 ENCOUNTER FOR SCREENING MAMMOGRAM FOR MALIGNANT NEOPLASM OF BREAST: ICD-10-CM

## 2023-05-19 DIAGNOSIS — R00.2 PALPITATIONS: ICD-10-CM

## 2023-05-19 DIAGNOSIS — I10 ESSENTIAL (PRIMARY) HYPERTENSION: ICD-10-CM

## 2023-05-19 RX ORDER — METOPROLOL SUCCINATE 100 MG/1
TABLET, EXTENDED RELEASE ORAL
Qty: 90 TABLET | Refills: 1 | Status: SHIPPED | OUTPATIENT
Start: 2023-05-19

## 2023-07-05 DIAGNOSIS — J30.1 NON-SEASONAL ALLERGIC RHINITIS DUE TO POLLEN: ICD-10-CM

## 2023-07-05 DIAGNOSIS — K21.9 GASTROESOPHAGEAL REFLUX DISEASE WITHOUT ESOPHAGITIS: ICD-10-CM

## 2023-07-05 DIAGNOSIS — E03.9 ACQUIRED HYPOTHYROIDISM: ICD-10-CM

## 2023-07-05 DIAGNOSIS — F41.1 GAD (GENERALIZED ANXIETY DISORDER): ICD-10-CM

## 2023-07-05 DIAGNOSIS — I10 ESSENTIAL HYPERTENSION: ICD-10-CM

## 2023-07-06 RX ORDER — MONTELUKAST SODIUM 10 MG/1
TABLET ORAL
Qty: 90 TABLET | Refills: 0 | Status: SHIPPED | OUTPATIENT
Start: 2023-07-06

## 2023-07-06 RX ORDER — FLUOXETINE HYDROCHLORIDE 20 MG/1
CAPSULE ORAL
Qty: 180 CAPSULE | Refills: 0 | Status: SHIPPED | OUTPATIENT
Start: 2023-07-06

## 2023-07-06 RX ORDER — LISINOPRIL AND HYDROCHLOROTHIAZIDE 20; 12.5 MG/1; MG/1
TABLET ORAL
Qty: 90 TABLET | Refills: 0 | Status: SHIPPED | OUTPATIENT
Start: 2023-07-06

## 2023-07-06 RX ORDER — LEVOTHYROXINE SODIUM 88 UG/1
TABLET ORAL
Qty: 90 TABLET | Refills: 0 | Status: SHIPPED | OUTPATIENT
Start: 2023-07-06

## 2023-07-06 RX ORDER — OMEPRAZOLE 20 MG/1
CAPSULE, DELAYED RELEASE ORAL
Qty: 90 CAPSULE | Refills: 0 | Status: SHIPPED | OUTPATIENT
Start: 2023-07-06

## 2023-09-01 ENCOUNTER — NURSE ONLY (OUTPATIENT)
Dept: FAMILY MEDICINE CLINIC | Facility: CLINIC | Age: 73
End: 2023-09-01

## 2023-09-01 DIAGNOSIS — R63.4 WEIGHT LOSS: ICD-10-CM

## 2023-09-01 DIAGNOSIS — E11.9 TYPE 2 DIABETES MELLITUS WITHOUT COMPLICATION, WITHOUT LONG-TERM CURRENT USE OF INSULIN (HCC): ICD-10-CM

## 2023-09-01 DIAGNOSIS — I10 ESSENTIAL HYPERTENSION: ICD-10-CM

## 2023-09-01 DIAGNOSIS — E78.5 HYPERLIPIDEMIA, UNSPECIFIED HYPERLIPIDEMIA TYPE: ICD-10-CM

## 2023-09-01 LAB
ALBUMIN SERPL-MCNC: 3.8 G/DL (ref 3.2–4.6)
ALBUMIN/GLOB SERPL: 1.2 (ref 0.4–1.6)
ALP SERPL-CCNC: 89 U/L (ref 50–136)
ALT SERPL-CCNC: 24 U/L (ref 12–65)
ANION GAP SERPL CALC-SCNC: 4 MMOL/L (ref 2–11)
AST SERPL-CCNC: 19 U/L (ref 15–37)
BASOPHILS # BLD: 0.1 K/UL (ref 0–0.2)
BASOPHILS NFR BLD: 1 % (ref 0–2)
BILIRUB SERPL-MCNC: 0.8 MG/DL (ref 0.2–1.1)
BUN SERPL-MCNC: 14 MG/DL (ref 8–23)
CALCIUM SERPL-MCNC: 10 MG/DL (ref 8.3–10.4)
CHLORIDE SERPL-SCNC: 101 MMOL/L (ref 101–110)
CHOLEST SERPL-MCNC: 124 MG/DL
CO2 SERPL-SCNC: 33 MMOL/L (ref 21–32)
CREAT SERPL-MCNC: 1.1 MG/DL (ref 0.6–1)
DIFFERENTIAL METHOD BLD: NORMAL
EOSINOPHIL # BLD: 0.3 K/UL (ref 0–0.8)
EOSINOPHIL NFR BLD: 4 % (ref 0.5–7.8)
ERYTHROCYTE [DISTWIDTH] IN BLOOD BY AUTOMATED COUNT: 13 % (ref 11.9–14.6)
EST. AVERAGE GLUCOSE BLD GHB EST-MCNC: 117 MG/DL
GLOBULIN SER CALC-MCNC: 3.2 G/DL (ref 2.8–4.5)
GLUCOSE SERPL-MCNC: 96 MG/DL (ref 65–100)
HBA1C MFR BLD: 5.7 % (ref 4.8–5.6)
HCT VFR BLD AUTO: 42.8 % (ref 35.8–46.3)
HDLC SERPL-MCNC: 46 MG/DL (ref 40–60)
HDLC SERPL: 2.7
HGB BLD-MCNC: 13.8 G/DL (ref 11.7–15.4)
IMM GRANULOCYTES # BLD AUTO: 0 K/UL (ref 0–0.5)
IMM GRANULOCYTES NFR BLD AUTO: 0 % (ref 0–5)
LDLC SERPL CALC-MCNC: 50.8 MG/DL
LYMPHOCYTES # BLD: 1.7 K/UL (ref 0.5–4.6)
LYMPHOCYTES NFR BLD: 24 % (ref 13–44)
MCH RBC QN AUTO: 30.5 PG (ref 26.1–32.9)
MCHC RBC AUTO-ENTMCNC: 32.2 G/DL (ref 31.4–35)
MCV RBC AUTO: 94.5 FL (ref 82–102)
MONOCYTES # BLD: 0.6 K/UL (ref 0.1–1.3)
MONOCYTES NFR BLD: 8 % (ref 4–12)
NEUTS SEG # BLD: 4.4 K/UL (ref 1.7–8.2)
NEUTS SEG NFR BLD: 63 % (ref 43–78)
NRBC # BLD: 0 K/UL (ref 0–0.2)
PLATELET # BLD AUTO: 176 K/UL (ref 150–450)
PMV BLD AUTO: 12.3 FL (ref 9.4–12.3)
POTASSIUM SERPL-SCNC: 3.8 MMOL/L (ref 3.5–5.1)
PROT SERPL-MCNC: 7 G/DL (ref 6.3–8.2)
RBC # BLD AUTO: 4.53 M/UL (ref 4.05–5.2)
SODIUM SERPL-SCNC: 138 MMOL/L (ref 133–143)
TRIGL SERPL-MCNC: 136 MG/DL (ref 35–150)
TSH, 3RD GENERATION: 3.31 UIU/ML (ref 0.36–3.74)
VLDLC SERPL CALC-MCNC: 27.2 MG/DL (ref 6–23)
WBC # BLD AUTO: 7.1 K/UL (ref 4.3–11.1)

## 2023-09-05 SDOH — HEALTH STABILITY: PHYSICAL HEALTH: ON AVERAGE, HOW MANY MINUTES DO YOU ENGAGE IN EXERCISE AT THIS LEVEL?: 30 MIN

## 2023-09-05 SDOH — HEALTH STABILITY: PHYSICAL HEALTH: ON AVERAGE, HOW MANY DAYS PER WEEK DO YOU ENGAGE IN MODERATE TO STRENUOUS EXERCISE (LIKE A BRISK WALK)?: 2 DAYS

## 2023-09-05 ASSESSMENT — PATIENT HEALTH QUESTIONNAIRE - PHQ9
SUM OF ALL RESPONSES TO PHQ QUESTIONS 1-9: 0
1. LITTLE INTEREST OR PLEASURE IN DOING THINGS: 0
SUM OF ALL RESPONSES TO PHQ QUESTIONS 1-9: 0
SUM OF ALL RESPONSES TO PHQ9 QUESTIONS 1 & 2: 0
2. FEELING DOWN, DEPRESSED OR HOPELESS: 0

## 2023-09-05 ASSESSMENT — LIFESTYLE VARIABLES
HOW OFTEN DO YOU HAVE A DRINK CONTAINING ALCOHOL: MONTHLY OR LESS
HOW MANY STANDARD DRINKS CONTAINING ALCOHOL DO YOU HAVE ON A TYPICAL DAY: 1 OR 2

## 2023-09-08 ENCOUNTER — PATIENT MESSAGE (OUTPATIENT)
Dept: FAMILY MEDICINE CLINIC | Facility: CLINIC | Age: 73
End: 2023-09-08

## 2023-09-08 NOTE — TELEPHONE ENCOUNTER
From: Fabrizio Prabhakar  To: Dr. Dmitry An: 9/8/2023 11:03 AM EDT  Subject: My appointment     I am sorry I did not make my appointment. I got to the  at 10:20 today, she said my appointment was at 10:00. I had my appointment in my calendar at 10:30. I know this was my fault but I have never been called as soon as I have checked in ever. They did not even say they would check with you to see if you could still see me. Again I am sorry and she rescheduled me for October. I understand your policy and it was my fault I did not double check on MyChart.

## 2023-09-18 DIAGNOSIS — E03.9 ACQUIRED HYPOTHYROIDISM: ICD-10-CM

## 2023-09-18 DIAGNOSIS — E55.9 VITAMIN D DEFICIENCY, UNSPECIFIED: ICD-10-CM

## 2023-09-18 RX ORDER — ERGOCALCIFEROL 1.25 MG/1
CAPSULE ORAL
Qty: 12 CAPSULE | Refills: 3 | Status: SHIPPED | OUTPATIENT
Start: 2023-09-18

## 2023-09-18 RX ORDER — LEVOTHYROXINE SODIUM 88 UG/1
TABLET ORAL
Qty: 90 TABLET | Refills: 0 | Status: SHIPPED | OUTPATIENT
Start: 2023-09-18

## 2023-10-09 SDOH — HEALTH STABILITY: PHYSICAL HEALTH: ON AVERAGE, HOW MANY DAYS PER WEEK DO YOU ENGAGE IN MODERATE TO STRENUOUS EXERCISE (LIKE A BRISK WALK)?: 2 DAYS

## 2023-10-09 SDOH — HEALTH STABILITY: PHYSICAL HEALTH: ON AVERAGE, HOW MANY MINUTES DO YOU ENGAGE IN EXERCISE AT THIS LEVEL?: 30 MIN

## 2023-10-09 ASSESSMENT — PATIENT HEALTH QUESTIONNAIRE - PHQ9
SUM OF ALL RESPONSES TO PHQ QUESTIONS 1-9: 0
SUM OF ALL RESPONSES TO PHQ QUESTIONS 1-9: 0
2. FEELING DOWN, DEPRESSED OR HOPELESS: 0
1. LITTLE INTEREST OR PLEASURE IN DOING THINGS: 0
SUM OF ALL RESPONSES TO PHQ9 QUESTIONS 1 & 2: 0
SUM OF ALL RESPONSES TO PHQ QUESTIONS 1-9: 0
SUM OF ALL RESPONSES TO PHQ QUESTIONS 1-9: 0

## 2023-10-09 ASSESSMENT — LIFESTYLE VARIABLES
HOW OFTEN DO YOU HAVE A DRINK CONTAINING ALCOHOL: 2
HOW OFTEN DO YOU HAVE SIX OR MORE DRINKS ON ONE OCCASION: 1
HOW MANY STANDARD DRINKS CONTAINING ALCOHOL DO YOU HAVE ON A TYPICAL DAY: 1
HOW MANY STANDARD DRINKS CONTAINING ALCOHOL DO YOU HAVE ON A TYPICAL DAY: 1 OR 2
HOW OFTEN DO YOU HAVE A DRINK CONTAINING ALCOHOL: MONTHLY OR LESS

## 2023-10-12 ENCOUNTER — OFFICE VISIT (OUTPATIENT)
Dept: FAMILY MEDICINE CLINIC | Facility: CLINIC | Age: 73
End: 2023-10-12
Payer: MEDICARE

## 2023-10-12 VITALS
DIASTOLIC BLOOD PRESSURE: 51 MMHG | HEIGHT: 63 IN | RESPIRATION RATE: 16 BRPM | WEIGHT: 232 LBS | SYSTOLIC BLOOD PRESSURE: 118 MMHG | HEART RATE: 67 BPM | OXYGEN SATURATION: 97 % | BODY MASS INDEX: 41.11 KG/M2 | TEMPERATURE: 97.2 F

## 2023-10-12 DIAGNOSIS — M19.90 ARTHRITIS: ICD-10-CM

## 2023-10-12 DIAGNOSIS — I10 ESSENTIAL HYPERTENSION: ICD-10-CM

## 2023-10-12 DIAGNOSIS — E78.5 HYPERLIPIDEMIA, UNSPECIFIED HYPERLIPIDEMIA TYPE: ICD-10-CM

## 2023-10-12 DIAGNOSIS — E03.9 ACQUIRED HYPOTHYROIDISM: ICD-10-CM

## 2023-10-12 DIAGNOSIS — M1A.00X0 IDIOPATHIC CHRONIC GOUT WITHOUT TOPHUS, UNSPECIFIED SITE: ICD-10-CM

## 2023-10-12 DIAGNOSIS — J30.1 NON-SEASONAL ALLERGIC RHINITIS DUE TO POLLEN: ICD-10-CM

## 2023-10-12 DIAGNOSIS — I10 ESSENTIAL (PRIMARY) HYPERTENSION: ICD-10-CM

## 2023-10-12 DIAGNOSIS — E53.8 B12 DEFICIENCY: ICD-10-CM

## 2023-10-12 DIAGNOSIS — F41.1 GAD (GENERALIZED ANXIETY DISORDER): ICD-10-CM

## 2023-10-12 DIAGNOSIS — K21.9 GASTROESOPHAGEAL REFLUX DISEASE WITHOUT ESOPHAGITIS: ICD-10-CM

## 2023-10-12 DIAGNOSIS — Z00.00 MEDICARE ANNUAL WELLNESS VISIT, SUBSEQUENT: Primary | ICD-10-CM

## 2023-10-12 DIAGNOSIS — R00.2 PALPITATIONS: ICD-10-CM

## 2023-10-12 DIAGNOSIS — E11.9 TYPE 2 DIABETES MELLITUS WITHOUT COMPLICATION, WITHOUT LONG-TERM CURRENT USE OF INSULIN (HCC): ICD-10-CM

## 2023-10-12 DIAGNOSIS — R60.9 EDEMA, UNSPECIFIED TYPE: ICD-10-CM

## 2023-10-12 PROCEDURE — 3078F DIAST BP <80 MM HG: CPT | Performed by: FAMILY MEDICINE

## 2023-10-12 PROCEDURE — 1123F ACP DISCUSS/DSCN MKR DOCD: CPT | Performed by: FAMILY MEDICINE

## 2023-10-12 PROCEDURE — 3017F COLORECTAL CA SCREEN DOC REV: CPT | Performed by: FAMILY MEDICINE

## 2023-10-12 PROCEDURE — 3044F HG A1C LEVEL LT 7.0%: CPT | Performed by: FAMILY MEDICINE

## 2023-10-12 PROCEDURE — G8484 FLU IMMUNIZE NO ADMIN: HCPCS | Performed by: FAMILY MEDICINE

## 2023-10-12 PROCEDURE — G0439 PPPS, SUBSEQ VISIT: HCPCS | Performed by: FAMILY MEDICINE

## 2023-10-12 PROCEDURE — 3074F SYST BP LT 130 MM HG: CPT | Performed by: FAMILY MEDICINE

## 2023-10-12 RX ORDER — FLUOXETINE HYDROCHLORIDE 20 MG/1
CAPSULE ORAL
Qty: 180 CAPSULE | Refills: 1 | Status: SHIPPED | OUTPATIENT
Start: 2023-10-12

## 2023-10-12 RX ORDER — FUROSEMIDE 20 MG/1
TABLET ORAL
Qty: 180 TABLET | Refills: 1 | Status: SHIPPED | OUTPATIENT
Start: 2023-10-12

## 2023-10-12 RX ORDER — MONTELUKAST SODIUM 10 MG/1
TABLET ORAL
Qty: 90 TABLET | Refills: 1 | Status: SHIPPED | OUTPATIENT
Start: 2023-10-12

## 2023-10-12 RX ORDER — ALLOPURINOL 100 MG/1
TABLET ORAL
Qty: 90 TABLET | Refills: 1 | Status: CANCELLED | OUTPATIENT
Start: 2023-10-12

## 2023-10-12 RX ORDER — LEVOTHYROXINE SODIUM 88 UG/1
88 TABLET ORAL
Qty: 90 TABLET | Refills: 1 | Status: SHIPPED | OUTPATIENT
Start: 2023-10-12

## 2023-10-12 RX ORDER — OMEPRAZOLE 20 MG/1
CAPSULE, DELAYED RELEASE ORAL
Qty: 90 CAPSULE | Refills: 1 | Status: SHIPPED | OUTPATIENT
Start: 2023-10-12

## 2023-10-12 RX ORDER — CYANOCOBALAMIN 1000 UG/ML
INJECTION, SOLUTION INTRAMUSCULAR; SUBCUTANEOUS
Qty: 10 ML | Refills: 5 | Status: SHIPPED | OUTPATIENT
Start: 2023-10-12

## 2023-10-12 RX ORDER — METFORMIN HYDROCHLORIDE 500 MG/1
TABLET, EXTENDED RELEASE ORAL
Qty: 180 TABLET | Refills: 1 | Status: SHIPPED | OUTPATIENT
Start: 2023-10-12

## 2023-10-12 RX ORDER — MELOXICAM 15 MG/1
TABLET ORAL
Qty: 90 TABLET | Refills: 1 | Status: SHIPPED | OUTPATIENT
Start: 2023-10-12

## 2023-10-12 RX ORDER — ATORVASTATIN CALCIUM 20 MG/1
TABLET, FILM COATED ORAL
Qty: 90 TABLET | Refills: 1 | Status: SHIPPED | OUTPATIENT
Start: 2023-10-12

## 2023-10-12 RX ORDER — SEMAGLUTIDE 1.34 MG/ML
INJECTION, SOLUTION SUBCUTANEOUS
Qty: 4 ML | Refills: 5 | Status: SHIPPED | OUTPATIENT
Start: 2023-10-12

## 2023-10-12 RX ORDER — IBUPROFEN 200 MG
TABLET ORAL
Qty: 50 EACH | Refills: 3 | Status: SHIPPED | OUTPATIENT
Start: 2023-10-12

## 2023-10-12 RX ORDER — LISINOPRIL AND HYDROCHLOROTHIAZIDE 20; 12.5 MG/1; MG/1
1 TABLET ORAL DAILY
Qty: 90 TABLET | Refills: 1 | Status: SHIPPED | OUTPATIENT
Start: 2023-10-12

## 2023-10-12 RX ORDER — METOPROLOL SUCCINATE 100 MG/1
100 TABLET, EXTENDED RELEASE ORAL DAILY
Qty: 90 TABLET | Refills: 1 | Status: SHIPPED | OUTPATIENT
Start: 2023-10-12

## 2023-10-12 ASSESSMENT — ENCOUNTER SYMPTOMS
VISUAL CHANGE: 0
ORTHOPNEA: 0
SHORTNESS OF BREATH: 0
BLURRED VISION: 0

## 2023-10-12 NOTE — PROGRESS NOTES
HISTORY OF PRESENT ILLNESS  Chief Complaint   Patient presents with    Medicare AWV    Medication Refill     B12 injections needles; only takes 1ml vial monthly      ? ?change to plain lisinopril due to gout? No gout flares    Usually 130/80    10/4/23 r cataract and 9/30 left eye cataract. Recent covid infections after cruise 9/11 week. Had been as high as 295 when started loosing weight. Diabetes Mellitus: Patient presents for follow up of diabetes. Symptoms: taking medications as instructed, no medication side effects noted, no TIA's, no chest pain on exertion, no dyspnea on exertion, no swelling of ankles. Symptoms have well controlled. Patient denies chest pain. She states she is compliant most of the time with her  medications. She  states she is compliant most of the time with her  diet. Evaluation to date has been included below. Home sugars: BGs consistently in an acceptable range. Treatment to date: medications. Diabetic issues reviewed with her: low cholesterol diet, weight control and daily exercise discussed. Key Antihyperglycemic Medications            metFORMIN (GLUCOPHAGE-XR) 500 MG extended release tablet (Taking)    Sig: TAKE 2 TABLETS BY MOUTH EVERY DAY AT HEAVIEST MEAL    Semaglutide, 1 MG/DOSE, (OZEMPIC, 1 MG/DOSE,) 4 MG/3ML SOPN (Taking)    Sig: Use 1.0 mg SQ once weekly, rotate injection sites           Key CAD CHF Meds            atorvastatin (LIPITOR) 20 MG tablet (Taking)    Sig: TAKE 1 TABLET BY MOUTH EVERY DAY AT BEDTIME FOR CHOLESTEROL    furosemide (LASIX) 20 MG tablet (Taking)    Sig: TAKE 1 TO 2 TABLETS BY MOUTH EVERY MORNING    metoprolol succinate (TOPROL XL) 100 MG extended release tablet (Taking)    Sig - Route: Take 1 tablet by mouth daily - Oral    lisinopril-hydroCHLOROthiazide (PRINZIDE;ZESTORETIC) 20-12.5 MG per tablet (Taking)    Sig - Route:  Take 1 tablet by mouth daily - Oral           Lab Results   Component Value Date    LABA1C 5.7 (H)

## 2023-11-07 NOTE — PROGRESS NOTES
counseling and/or coordinating care regarding KAYLAH and CPAP.         [id number], APRN - CNP  Electronically signed

## 2023-11-08 ENCOUNTER — TELEMEDICINE (OUTPATIENT)
Dept: SLEEP MEDICINE | Age: 73
End: 2023-11-08
Payer: MEDICARE

## 2023-11-08 DIAGNOSIS — E66.01 CLASS 3 SEVERE OBESITY DUE TO EXCESS CALORIES WITHOUT SERIOUS COMORBIDITY WITH BODY MASS INDEX (BMI) OF 40.0 TO 44.9 IN ADULT (HCC): ICD-10-CM

## 2023-11-08 DIAGNOSIS — G47.33 OSA (OBSTRUCTIVE SLEEP APNEA): Primary | ICD-10-CM

## 2023-11-08 PROCEDURE — 99213 OFFICE O/P EST LOW 20 MIN: CPT | Performed by: NURSE PRACTITIONER

## 2023-11-08 PROCEDURE — 3017F COLORECTAL CA SCREEN DOC REV: CPT | Performed by: NURSE PRACTITIONER

## 2023-11-08 PROCEDURE — G8427 DOCREV CUR MEDS BY ELIG CLIN: HCPCS | Performed by: NURSE PRACTITIONER

## 2023-11-08 PROCEDURE — G8399 PT W/DXA RESULTS DOCUMENT: HCPCS | Performed by: NURSE PRACTITIONER

## 2023-11-08 PROCEDURE — 1123F ACP DISCUSS/DSCN MKR DOCD: CPT | Performed by: NURSE PRACTITIONER

## 2023-11-08 PROCEDURE — 1090F PRES/ABSN URINE INCON ASSESS: CPT | Performed by: NURSE PRACTITIONER

## 2023-11-08 ASSESSMENT — SLEEP AND FATIGUE QUESTIONNAIRES
HOW LIKELY ARE YOU TO NOD OFF OR FALL ASLEEP WHILE LYING DOWN TO REST IN THE AFTERNOON WHEN CIRCUMSTANCES PERMIT: 3
HOW LIKELY ARE YOU TO NOD OFF OR FALL ASLEEP WHILE SITTING QUIETLY AFTER LUNCH WITHOUT ALCOHOL: 1
ESS TOTAL SCORE: 8
HOW LIKELY ARE YOU TO NOD OFF OR FALL ASLEEP WHILE SITTING AND TALKING TO SOMEONE: 0
HOW LIKELY ARE YOU TO NOD OFF OR FALL ASLEEP WHILE SITTING INACTIVE IN A PUBLIC PLACE: 0
HOW LIKELY ARE YOU TO NOD OFF OR FALL ASLEEP WHEN YOU ARE A PASSENGER IN A CAR FOR AN HOUR WITHOUT A BREAK: 2
HOW LIKELY ARE YOU TO NOD OFF OR FALL ASLEEP IN A CAR, WHILE STOPPED FOR A FEW MINUTES IN TRAFFIC: 0
HOW LIKELY ARE YOU TO NOD OFF OR FALL ASLEEP WHILE SITTING AND READING: 1
HOW LIKELY ARE YOU TO NOD OFF OR FALL ASLEEP WHILE WATCHING TV: 1

## 2023-11-08 NOTE — PATIENT INSTRUCTIONS
Continue CPAP 7-15 cm H2O with compliance  New CPAP supplies ordered  Recommendations as above  Follow-up in 1 year or sooner if needed

## 2023-11-14 DIAGNOSIS — M1A.00X0 IDIOPATHIC CHRONIC GOUT WITHOUT TOPHUS, UNSPECIFIED SITE: ICD-10-CM

## 2023-11-14 RX ORDER — ALLOPURINOL 100 MG/1
TABLET ORAL
Qty: 90 TABLET | Refills: 1 | Status: SHIPPED | OUTPATIENT
Start: 2023-11-14

## 2023-11-14 NOTE — TELEPHONE ENCOUNTER
Prescription(s) refilled  It (they) has been escribed to the pharmacy. Requested Prescriptions     Signed Prescriptions Disp Refills    allopurinol (ZYLOPRIM) 100 MG tablet 90 tablet 1     Sig: TAKE 1 TABLET BY MOUTH EVERY DAY     Authorizing Provider: JOSEFINA JORDAN     No orders of the defined types were placed in this encounter. ICD-10-CM    1. Idiopathic chronic gout without tophus, unspecified site  M1A. 00X0 allopurinol (ZYLOPRIM) 100 MG tablet

## 2024-03-14 DIAGNOSIS — I10 ESSENTIAL (PRIMARY) HYPERTENSION: ICD-10-CM

## 2024-03-14 DIAGNOSIS — J30.1 NON-SEASONAL ALLERGIC RHINITIS DUE TO POLLEN: ICD-10-CM

## 2024-03-14 DIAGNOSIS — M19.90 ARTHRITIS: ICD-10-CM

## 2024-03-14 DIAGNOSIS — E78.5 HYPERLIPIDEMIA, UNSPECIFIED HYPERLIPIDEMIA TYPE: ICD-10-CM

## 2024-03-14 DIAGNOSIS — R60.9 EDEMA, UNSPECIFIED TYPE: ICD-10-CM

## 2024-03-14 DIAGNOSIS — R00.2 PALPITATIONS: ICD-10-CM

## 2024-03-14 DIAGNOSIS — F41.1 GAD (GENERALIZED ANXIETY DISORDER): ICD-10-CM

## 2024-03-14 DIAGNOSIS — K21.9 GASTROESOPHAGEAL REFLUX DISEASE WITHOUT ESOPHAGITIS: ICD-10-CM

## 2024-03-14 DIAGNOSIS — I10 ESSENTIAL HYPERTENSION: ICD-10-CM

## 2024-03-14 DIAGNOSIS — M1A.00X0 IDIOPATHIC CHRONIC GOUT WITHOUT TOPHUS, UNSPECIFIED SITE: ICD-10-CM

## 2024-03-15 RX ORDER — ATORVASTATIN CALCIUM 20 MG/1
TABLET, FILM COATED ORAL
Qty: 90 TABLET | Refills: 2 | Status: SHIPPED | OUTPATIENT
Start: 2024-03-15

## 2024-03-15 RX ORDER — FUROSEMIDE 20 MG/1
TABLET ORAL
Qty: 180 TABLET | Refills: 2 | Status: SHIPPED | OUTPATIENT
Start: 2024-03-15

## 2024-03-15 RX ORDER — METOPROLOL SUCCINATE 100 MG/1
100 TABLET, EXTENDED RELEASE ORAL DAILY
Qty: 90 TABLET | Refills: 2 | Status: SHIPPED | OUTPATIENT
Start: 2024-03-15

## 2024-03-15 RX ORDER — LISINOPRIL AND HYDROCHLOROTHIAZIDE 20; 12.5 MG/1; MG/1
1 TABLET ORAL DAILY
Qty: 90 TABLET | Refills: 2 | Status: SHIPPED | OUTPATIENT
Start: 2024-03-15

## 2024-03-15 RX ORDER — FLUOXETINE HYDROCHLORIDE 20 MG/1
CAPSULE ORAL
Qty: 180 CAPSULE | Refills: 2 | Status: SHIPPED | OUTPATIENT
Start: 2024-03-15

## 2024-03-15 RX ORDER — ALLOPURINOL 100 MG/1
TABLET ORAL
Qty: 90 TABLET | Refills: 2 | Status: SHIPPED | OUTPATIENT
Start: 2024-03-15

## 2024-03-15 RX ORDER — MONTELUKAST SODIUM 10 MG/1
TABLET ORAL
Qty: 90 TABLET | Refills: 2 | Status: SHIPPED | OUTPATIENT
Start: 2024-03-15

## 2024-03-15 RX ORDER — OMEPRAZOLE 20 MG/1
CAPSULE, DELAYED RELEASE ORAL
Qty: 90 CAPSULE | Refills: 2 | Status: SHIPPED | OUTPATIENT
Start: 2024-03-15

## 2024-03-15 RX ORDER — MELOXICAM 15 MG/1
TABLET ORAL
Qty: 90 TABLET | Refills: 2 | Status: SHIPPED | OUTPATIENT
Start: 2024-03-15

## 2024-03-25 SDOH — ECONOMIC STABILITY: FOOD INSECURITY: WITHIN THE PAST 12 MONTHS, THE FOOD YOU BOUGHT JUST DIDN'T LAST AND YOU DIDN'T HAVE MONEY TO GET MORE.: NEVER TRUE

## 2024-03-25 SDOH — ECONOMIC STABILITY: FOOD INSECURITY: WITHIN THE PAST 12 MONTHS, YOU WORRIED THAT YOUR FOOD WOULD RUN OUT BEFORE YOU GOT MONEY TO BUY MORE.: NEVER TRUE

## 2024-03-25 SDOH — ECONOMIC STABILITY: INCOME INSECURITY: HOW HARD IS IT FOR YOU TO PAY FOR THE VERY BASICS LIKE FOOD, HOUSING, MEDICAL CARE, AND HEATING?: NOT VERY HARD

## 2024-03-25 ASSESSMENT — PATIENT HEALTH QUESTIONNAIRE - PHQ9
SUM OF ALL RESPONSES TO PHQ9 QUESTIONS 1 & 2: 0
2. FEELING DOWN, DEPRESSED OR HOPELESS: NOT AT ALL
1. LITTLE INTEREST OR PLEASURE IN DOING THINGS: NOT AT ALL
SUM OF ALL RESPONSES TO PHQ QUESTIONS 1-9: 0
SUM OF ALL RESPONSES TO PHQ9 QUESTIONS 1 & 2: 0
1. LITTLE INTEREST OR PLEASURE IN DOING THINGS: NOT AT ALL
SUM OF ALL RESPONSES TO PHQ QUESTIONS 1-9: 0
2. FEELING DOWN, DEPRESSED OR HOPELESS: NOT AT ALL
SUM OF ALL RESPONSES TO PHQ QUESTIONS 1-9: 0
SUM OF ALL RESPONSES TO PHQ QUESTIONS 1-9: 0

## 2024-03-26 ENCOUNTER — OFFICE VISIT (OUTPATIENT)
Dept: FAMILY MEDICINE CLINIC | Facility: CLINIC | Age: 74
End: 2024-03-26
Payer: MEDICARE

## 2024-03-26 ENCOUNTER — PATIENT MESSAGE (OUTPATIENT)
Dept: FAMILY MEDICINE CLINIC | Facility: CLINIC | Age: 74
End: 2024-03-26

## 2024-03-26 VITALS
BODY MASS INDEX: 38.62 KG/M2 | DIASTOLIC BLOOD PRESSURE: 68 MMHG | OXYGEN SATURATION: 98 % | WEIGHT: 218 LBS | TEMPERATURE: 97.2 F | SYSTOLIC BLOOD PRESSURE: 124 MMHG | HEIGHT: 63 IN | HEART RATE: 78 BPM | RESPIRATION RATE: 16 BRPM

## 2024-03-26 DIAGNOSIS — E53.8 B12 DEFICIENCY: ICD-10-CM

## 2024-03-26 DIAGNOSIS — I48.19 PERSISTENT ATRIAL FIBRILLATION (HCC): ICD-10-CM

## 2024-03-26 DIAGNOSIS — E66.01 SEVERE OBESITY (BMI 35.0-39.9) WITH COMORBIDITY (HCC): ICD-10-CM

## 2024-03-26 DIAGNOSIS — E78.5 HYPERLIPIDEMIA, UNSPECIFIED HYPERLIPIDEMIA TYPE: ICD-10-CM

## 2024-03-26 DIAGNOSIS — R63.4 WEIGHT LOSS: ICD-10-CM

## 2024-03-26 DIAGNOSIS — E11.9 TYPE 2 DIABETES MELLITUS WITHOUT COMPLICATION, WITHOUT LONG-TERM CURRENT USE OF INSULIN (HCC): Primary | ICD-10-CM

## 2024-03-26 DIAGNOSIS — I10 ESSENTIAL HYPERTENSION: ICD-10-CM

## 2024-03-26 DIAGNOSIS — E03.9 HYPOTHYROIDISM, UNSPECIFIED TYPE: ICD-10-CM

## 2024-03-26 DIAGNOSIS — E55.9 VITAMIN D DEFICIENCY: ICD-10-CM

## 2024-03-26 DIAGNOSIS — E03.9 ACQUIRED HYPOTHYROIDISM: ICD-10-CM

## 2024-03-26 DIAGNOSIS — N18.30 STAGE 3 CHRONIC KIDNEY DISEASE, UNSPECIFIED WHETHER STAGE 3A OR 3B CKD (HCC): ICD-10-CM

## 2024-03-26 DIAGNOSIS — G47.33 OSA (OBSTRUCTIVE SLEEP APNEA): ICD-10-CM

## 2024-03-26 DIAGNOSIS — R00.2 PALPITATION: ICD-10-CM

## 2024-03-26 DIAGNOSIS — Z79.899 ENCOUNTER FOR LONG-TERM (CURRENT) USE OF MEDICATIONS: ICD-10-CM

## 2024-03-26 LAB
ALBUMIN SERPL-MCNC: 4.1 G/DL (ref 3.2–4.6)
ALBUMIN/GLOB SERPL: 1.3 (ref 0.4–1.6)
ALP SERPL-CCNC: 77 U/L (ref 50–136)
ALT SERPL-CCNC: 25 U/L (ref 12–65)
ANION GAP SERPL CALC-SCNC: 3 MMOL/L (ref 2–11)
AST SERPL-CCNC: 17 U/L (ref 15–37)
BASOPHILS # BLD: 0.1 K/UL (ref 0–0.2)
BASOPHILS NFR BLD: 1 % (ref 0–2)
BILIRUB SERPL-MCNC: 0.9 MG/DL (ref 0.2–1.1)
BUN SERPL-MCNC: 16 MG/DL (ref 8–23)
CALCIUM SERPL-MCNC: 10.8 MG/DL (ref 8.3–10.4)
CHLORIDE SERPL-SCNC: 97 MMOL/L (ref 103–113)
CO2 SERPL-SCNC: 36 MMOL/L (ref 21–32)
CREAT SERPL-MCNC: 1.2 MG/DL (ref 0.6–1)
DIFFERENTIAL METHOD BLD: NORMAL
EOSINOPHIL # BLD: 0.3 K/UL (ref 0–0.8)
EOSINOPHIL NFR BLD: 3 % (ref 0.5–7.8)
ERYTHROCYTE [DISTWIDTH] IN BLOOD BY AUTOMATED COUNT: 12.5 % (ref 11.9–14.6)
GLOBULIN SER CALC-MCNC: 3.2 G/DL (ref 2.8–4.5)
GLUCOSE SERPL-MCNC: 84 MG/DL (ref 65–100)
HCT VFR BLD AUTO: 45.6 % (ref 35.8–46.3)
HGB BLD-MCNC: 15 G/DL (ref 11.7–15.4)
IMM GRANULOCYTES # BLD AUTO: 0 K/UL (ref 0–0.5)
IMM GRANULOCYTES NFR BLD AUTO: 0 % (ref 0–5)
LYMPHOCYTES # BLD: 2.8 K/UL (ref 0.5–4.6)
LYMPHOCYTES NFR BLD: 26 % (ref 13–44)
MAGNESIUM SERPL-MCNC: 2.2 MG/DL (ref 1.8–2.4)
MCH RBC QN AUTO: 30.5 PG (ref 26.1–32.9)
MCHC RBC AUTO-ENTMCNC: 32.9 G/DL (ref 31.4–35)
MCV RBC AUTO: 92.7 FL (ref 82–102)
MONOCYTES # BLD: 1 K/UL (ref 0.1–1.3)
MONOCYTES NFR BLD: 10 % (ref 4–12)
NEUTS SEG # BLD: 6.7 K/UL (ref 1.7–8.2)
NEUTS SEG NFR BLD: 60 % (ref 43–78)
NRBC # BLD: 0 K/UL (ref 0–0.2)
PLATELET # BLD AUTO: 234 K/UL (ref 150–450)
PMV BLD AUTO: 11.6 FL (ref 9.4–12.3)
POTASSIUM SERPL-SCNC: 3.4 MMOL/L (ref 3.5–5.1)
PROT SERPL-MCNC: 7.3 G/DL (ref 6.3–8.2)
RBC # BLD AUTO: 4.92 M/UL (ref 4.05–5.2)
SODIUM SERPL-SCNC: 136 MMOL/L (ref 136–146)
T4 FREE SERPL-MCNC: 1.1 NG/DL (ref 0.78–1.46)
TSH, 3RD GENERATION: 10.3 UIU/ML (ref 0.36–3.74)
WBC # BLD AUTO: 10.9 K/UL (ref 4.3–11.1)

## 2024-03-26 PROCEDURE — 99214 OFFICE O/P EST MOD 30 MIN: CPT | Performed by: FAMILY MEDICINE

## 2024-03-26 PROCEDURE — 93000 ELECTROCARDIOGRAM COMPLETE: CPT | Performed by: FAMILY MEDICINE

## 2024-03-26 PROCEDURE — 1123F ACP DISCUSS/DSCN MKR DOCD: CPT | Performed by: FAMILY MEDICINE

## 2024-03-26 PROCEDURE — G2211 COMPLEX E/M VISIT ADD ON: HCPCS | Performed by: FAMILY MEDICINE

## 2024-03-26 PROCEDURE — 3078F DIAST BP <80 MM HG: CPT | Performed by: FAMILY MEDICINE

## 2024-03-26 PROCEDURE — 3074F SYST BP LT 130 MM HG: CPT | Performed by: FAMILY MEDICINE

## 2024-03-26 RX ORDER — METFORMIN HYDROCHLORIDE 500 MG/1
TABLET, EXTENDED RELEASE ORAL
Qty: 180 TABLET | Refills: 1 | Status: SHIPPED | OUTPATIENT
Start: 2024-03-26

## 2024-03-26 RX ORDER — LEVOTHYROXINE SODIUM 88 UG/1
88 TABLET ORAL
Qty: 90 TABLET | Refills: 1 | Status: SHIPPED | OUTPATIENT
Start: 2024-03-26

## 2024-03-26 NOTE — PROGRESS NOTES
HISTORY OF PRESENT ILLNESS  Chief Complaint   Patient presents with    Palpitations     Pt has had palpitations but was told by home health that she has pvc and possible afib     NOTICE FOR THE PATIENT: This clinical note is not designed to be interpreted by patients.  We do not recommend reading it unless you have medical training. These notes may contain candid and (unintentionally) offensive descriptions, which are sometimes required for accurate documentation. If you would like more information about your healthcare, please obtain it directly by myself or my staff/colleagues - never solely from the notes. Thank you for your understanding and cooperation.     Changed insurance to a medicare advantage.  Nurse came out on Saturday.    f/up from insurance home nurse visit    jana Burns   Pt has had palpitations but was told by home health that she has pvc and possible afib    At times chest heaviness.  At times when she stands will feel a little light headed.    A1c 5.5 on Saturday.  Did circulation of toes and fingers.    Diabetes Mellitus: Patient presents for follow up of diabetes. Symptoms: taking medications as instructed, no medication side effects noted, no TIA's, no chest pain on exertion, no dyspnea on exertion, no swelling of ankles. Symptoms have well controlled.    Patient denies chest pain.  She states she is compliant most of the time with her  medications. She  states she is compliant most of the time with her  diet.  Evaluation to date has been included below.   Home sugars: BGs consistently in an acceptable range. Treatment to date: medications.  Diabetic issues reviewed with her: low cholesterol diet, weight control and daily exercise discussed.  Diabetic Medications       Biguanides       metFORMIN (GLUCOPHAGE-XR) 500 MG extended release tablet TAKE 2 TABLETS BY MOUTH EVERY DAY AT HEAVIEST MEAL       Incretin Mimetic Agents       Semaglutide, 1 MG/DOSE, (OZEMPIC, 1 MG/DOSE,) 4 MG/3ML

## 2024-03-27 NOTE — TELEPHONE ENCOUNTER
From: Lisbeth Reyes  To: Dr. Emani Alvarez  Sent: 3/26/2024 6:19 PM EDT  Subject: Meloxicam    When I picked up Eloquis, the pharmacist asked if you said to stop meloxicam. He told me to contact you to stop taking it while on eloquis. Is that what you want me to do?

## 2024-04-04 NOTE — RESULT ENCOUNTER NOTE
Your thyroid level is somewhat hypothyroid.  We could adjust your medication but I am hesitant to do so until we get your heart evaluated.  Your potassium was a little on the low side.  Make sure you get some electrolyte beverages in your fluids occasionally.    Your kidney function is borderline.  We will need to keep an eye on that.  Make sure you are drinking plenty of fluids.  We will need to keep your blood pressure and blood sugar under control.  Stay away from anti-inflammatories like Advil and Aleve.  Recheck that level with an office visit in 3 months.

## 2024-05-16 ENCOUNTER — INITIAL CONSULT (OUTPATIENT)
Age: 74
End: 2024-05-16
Payer: MEDICARE

## 2024-05-16 VITALS
DIASTOLIC BLOOD PRESSURE: 68 MMHG | SYSTOLIC BLOOD PRESSURE: 128 MMHG | HEIGHT: 63 IN | BODY MASS INDEX: 38.8 KG/M2 | WEIGHT: 219 LBS | HEART RATE: 66 BPM

## 2024-05-16 DIAGNOSIS — G47.33 OSA (OBSTRUCTIVE SLEEP APNEA): ICD-10-CM

## 2024-05-16 DIAGNOSIS — I10 ESSENTIAL HYPERTENSION: ICD-10-CM

## 2024-05-16 DIAGNOSIS — R60.0 BILATERAL LOWER EXTREMITY EDEMA: ICD-10-CM

## 2024-05-16 DIAGNOSIS — E78.2 MIXED HYPERLIPIDEMIA: ICD-10-CM

## 2024-05-16 DIAGNOSIS — I48.19 PERSISTENT ATRIAL FIBRILLATION (HCC): Primary | ICD-10-CM

## 2024-05-16 PROCEDURE — 3078F DIAST BP <80 MM HG: CPT | Performed by: INTERNAL MEDICINE

## 2024-05-16 PROCEDURE — 3074F SYST BP LT 130 MM HG: CPT | Performed by: INTERNAL MEDICINE

## 2024-05-16 PROCEDURE — 93000 ELECTROCARDIOGRAM COMPLETE: CPT | Performed by: INTERNAL MEDICINE

## 2024-05-16 PROCEDURE — 1123F ACP DISCUSS/DSCN MKR DOCD: CPT | Performed by: INTERNAL MEDICINE

## 2024-05-16 PROCEDURE — 99205 OFFICE O/P NEW HI 60 MIN: CPT | Performed by: INTERNAL MEDICINE

## 2024-05-16 RX ORDER — SODIUM CHLORIDE 9 MG/ML
INJECTION, SOLUTION INTRAVENOUS PRN
OUTPATIENT
Start: 2024-05-16

## 2024-05-16 RX ORDER — SODIUM CHLORIDE 0.9 % (FLUSH) 0.9 %
5-40 SYRINGE (ML) INJECTION PRN
OUTPATIENT
Start: 2024-05-16

## 2024-05-16 RX ORDER — FLECAINIDE ACETATE 50 MG/1
50 TABLET ORAL 2 TIMES DAILY
Qty: 60 TABLET | Refills: 11 | Status: SHIPPED | OUTPATIENT
Start: 2024-05-16

## 2024-05-16 RX ORDER — SODIUM CHLORIDE 0.9 % (FLUSH) 0.9 %
5-40 SYRINGE (ML) INJECTION EVERY 12 HOURS SCHEDULED
OUTPATIENT
Start: 2024-05-16

## 2024-05-16 ASSESSMENT — ENCOUNTER SYMPTOMS
EYE REDNESS: 0
WHEEZING: 0
STRIDOR: 0
HEMATEMESIS: 0
HOARSE VOICE: 0
HEMOPTYSIS: 0
ABDOMINAL PAIN: 0
HEMATOCHEZIA: 0
DOUBLE VISION: 0

## 2024-05-16 NOTE — PROGRESS NOTES
[facility] CARDIOLOGY  [address] [place] [address], [address]  [place] [address]  PHONE: [phone]          24    NAME:  Lisbeth Reyes  : 1950  MRN: [id number]         SUBJECTIVE:   Lisbeth Reyes is a 73 y.o. female seen for a visit regarding the following:     Chief Complaint   Patient presents with    Hyperlipidemia    Hypertension           HPI:    Cardio problem list:  1.  A-fib-noted initially on  EKG from 3/26/2024  2.  Hypertension  3.  Hyperlipidemia  4.  Type 2 diabetes mellitus  5.  Lower extremity edema  6.  Obesity  7.  Sleep apnea on CPAP    Dear Dr. lAvarez,  I saw Ms. Reyes who is a pleasant 73-year-old woman in cardiovascular consultation for A-fib, hypertension, hyperlipidemia with underlying sleep apnea lower extremity edema.    Atrial fibrillation-new diagnosis in 2024.  Has palpitations.  Has had dyspnea on exertion.  No orthopnea PND.  Lower extremity edema if anything has improved over time.  No TIAs or strokelike symptoms.  Was started on Eliquis in 2024 and has been on it now for close to 2 months.  Denies any excessive bleeding or bruising.  Occasional epistaxis when she blows her nose hard but not otherwise.  -No chest pain in any way.  No history of any known coronary artery disease.    Hypertension: Denies headaches blurry vision and remains compliant on her current therapy.    Hyperlipidemia-remains on appropriate atorvastatin therapy with no significant myalgias.    Type 2 diabetes mellitus-has lost 80 pounds after she was started on Ozempic and already feels significantly better.  Sugars have been a lot better.    Sleep apnea-remains on CPAP therapy and is doing well with this.  Denies any excessive daytime drowsiness or fatigue.        Past Medical History, Past Surgical History, Family history, Social History, and Medications were all reviewed with the patient today and updated as necessary.     No Known Allergies  Patient Active Problem

## 2024-05-21 NOTE — PROGRESS NOTES
Patient pre-assessment complete for CVN scheduled for Dr Olivarez, arrival time 100. Patient verified using .  NPO status reinforced. Patient instructed to HOLD Lasix & Lisinopril HCTZ. Instructed they can take all other medications excluding vitamins & supplements. Patient verbalizes understanding of all instructions & denies any questions at this time.

## 2024-05-22 ENCOUNTER — HOSPITAL ENCOUNTER (OUTPATIENT)
Dept: CARDIAC CATH/INVASIVE PROCEDURES | Age: 74
Discharge: HOME | End: 2024-05-22
Attending: INTERNAL MEDICINE | Admitting: INTERNAL MEDICINE
Payer: MEDICARE

## 2024-05-22 ENCOUNTER — APPOINTMENT (OUTPATIENT)
Dept: NON INVASIVE DIAGNOSTICS | Age: 74
End: 2024-05-22
Attending: INTERNAL MEDICINE
Payer: MEDICARE

## 2024-05-22 VITALS
OXYGEN SATURATION: 100 % | RESPIRATION RATE: 17 BRPM | BODY MASS INDEX: 38.8 KG/M2 | WEIGHT: 219 LBS | HEIGHT: 63 IN | SYSTOLIC BLOOD PRESSURE: 112 MMHG | DIASTOLIC BLOOD PRESSURE: 66 MMHG | HEART RATE: 61 BPM

## 2024-05-22 DIAGNOSIS — I48.91 UNSPECIFIED ATRIAL FIBRILLATION (HCC): Primary | ICD-10-CM

## 2024-05-22 DIAGNOSIS — I48.91 ATRIAL FIBRILLATION (HCC): ICD-10-CM

## 2024-05-22 PROBLEM — I48.19 PERSISTENT ATRIAL FIBRILLATION (HCC): Status: ACTIVE | Noted: 2024-05-22

## 2024-05-22 LAB
ANION GAP SERPL CALC-SCNC: 9 MMOL/L (ref 9–18)
BUN SERPL-MCNC: 10 MG/DL (ref 8–23)
CALCIUM SERPL-MCNC: 10.3 MG/DL (ref 8.8–10.2)
CHLORIDE SERPL-SCNC: 101 MMOL/L (ref 98–107)
CO2 SERPL-SCNC: 31 MMOL/L (ref 20–28)
CREAT SERPL-MCNC: 0.96 MG/DL (ref 0.6–1.1)
ECHO AO ASC DIAM: 3.3 CM
ECHO AO ASCENDING AORTA INDEX: 1.64 CM/M2
ECHO AO ROOT DIAM: 2.8 CM
ECHO AO ROOT INDEX: 1.39 CM/M2
ECHO AV AREA PEAK VELOCITY: 1.9 CM2
ECHO AV AREA VTI: 1.9 CM2
ECHO AV AREA/BSA PEAK VELOCITY: 0.9 CM2/M2
ECHO AV AREA/BSA VTI: 0.9 CM2/M2
ECHO AV MEAN GRADIENT: 4 MMHG
ECHO AV MEAN VELOCITY: 1 M/S
ECHO AV PEAK GRADIENT: 6 MMHG
ECHO AV PEAK VELOCITY: 1.3 M/S
ECHO AV VELOCITY RATIO: 0.77
ECHO AV VTI: 29.5 CM
ECHO BSA: 2.1 M2
ECHO BSA: 2.1 M2
ECHO EST RA PRESSURE: 3 MMHG
ECHO LA DIAMETER INDEX: 2.39 CM/M2
ECHO LA DIAMETER: 4.8 CM
ECHO LA TO AORTIC ROOT RATIO: 1.71
ECHO LV E' LATERAL VELOCITY: 8 CM/S
ECHO LV E' SEPTAL VELOCITY: 6 CM/S
ECHO LV EDV A2C: 63 ML
ECHO LV EDV A4C: 49 ML
ECHO LV EDV BP: 56 ML (ref 56–104)
ECHO LV EDV INDEX A4C: 24 ML/M2
ECHO LV EDV INDEX BP: 28 ML/M2
ECHO LV EDV NDEX A2C: 31 ML/M2
ECHO LV EJECTION FRACTION A2C: 60 %
ECHO LV EJECTION FRACTION A4C: 62 %
ECHO LV EJECTION FRACTION BIPLANE: 60 % (ref 55–100)
ECHO LV ESV A2C: 25 ML
ECHO LV ESV A4C: 18 ML
ECHO LV ESV BP: 22 ML (ref 19–49)
ECHO LV ESV INDEX A2C: 12 ML/M2
ECHO LV ESV INDEX A4C: 9 ML/M2
ECHO LV ESV INDEX BP: 11 ML/M2
ECHO LV FRACTIONAL SHORTENING: 37 % (ref 28–44)
ECHO LV INTERNAL DIMENSION DIASTOLE INDEX: 2.29 CM/M2
ECHO LV INTERNAL DIMENSION DIASTOLIC: 4.6 CM (ref 3.9–5.3)
ECHO LV INTERNAL DIMENSION SYSTOLIC INDEX: 1.44 CM/M2
ECHO LV INTERNAL DIMENSION SYSTOLIC: 2.9 CM
ECHO LV IVSD: 0.9 CM (ref 0.6–0.9)
ECHO LV MASS 2D: 148.1 G (ref 67–162)
ECHO LV MASS INDEX 2D: 73.7 G/M2 (ref 43–95)
ECHO LV POSTERIOR WALL DIASTOLIC: 1 CM (ref 0.6–0.9)
ECHO LV RELATIVE WALL THICKNESS RATIO: 0.43
ECHO LVOT AREA: 2.5 CM2
ECHO LVOT AV VTI INDEX: 0.76
ECHO LVOT DIAM: 1.8 CM
ECHO LVOT MEAN GRADIENT: 2 MMHG
ECHO LVOT PEAK GRADIENT: 4 MMHG
ECHO LVOT PEAK VELOCITY: 1 M/S
ECHO LVOT STROKE VOLUME INDEX: 28.3 ML/M2
ECHO LVOT SV: 57 ML
ECHO LVOT VTI: 22.4 CM
ECHO MV A VELOCITY: 0.37 M/S
ECHO MV AREA PHT: 2.9 CM2
ECHO MV E DECELERATION TIME (DT): 265 MS
ECHO MV E VELOCITY: 0.95 M/S
ECHO MV E/A RATIO: 2.57
ECHO MV E/E' LATERAL: 11.88
ECHO MV E/E' RATIO (AVERAGED): 13.85
ECHO MV E/E' SEPTAL: 15.83
ECHO MV PRESSURE HALF TIME (PHT): 76.9 MS
ECHO PULMONARY ARTERY END DIASTOLIC PRESSURE: 6 MMHG
ECHO PV MAX VELOCITY: 0.9 M/S
ECHO PV PEAK GRADIENT: 4 MMHG
ECHO PV REGURGITANT MAX VELOCITY: 1.2 M/S
ECHO RIGHT VENTRICULAR SYSTOLIC PRESSURE (RVSP): 47 MMHG
ECHO RV BASAL DIMENSION: 4.3 CM
ECHO RV FREE WALL PEAK S': 11 CM/S
ECHO RV TAPSE: 1.8 CM (ref 1.7–?)
ECHO TV REGURGITANT MAX VELOCITY: 3.32 M/S
ECHO TV REGURGITANT PEAK GRADIENT: 44 MMHG
EKG ATRIAL RATE: 57 BPM
EKG DIAGNOSIS: NORMAL
EKG DIAGNOSIS: NORMAL
EKG P AXIS: 48 DEGREES
EKG P-R INTERVAL: 234 MS
EKG Q-T INTERVAL: 420 MS
EKG Q-T INTERVAL: 476 MS
EKG QRS DURATION: 82 MS
EKG QRS DURATION: 84 MS
EKG QTC CALCULATION (BAZETT): 440 MS
EKG QTC CALCULATION (BAZETT): 463 MS
EKG R AXIS: -39 DEGREES
EKG R AXIS: -53 DEGREES
EKG T AXIS: 76 DEGREES
EKG T AXIS: 76 DEGREES
EKG VENTRICULAR RATE: 57 BPM
EKG VENTRICULAR RATE: 66 BPM
ERYTHROCYTE [DISTWIDTH] IN BLOOD BY AUTOMATED COUNT: 13 % (ref 11.9–14.6)
GLUCOSE SERPL-MCNC: 98 MG/DL (ref 70–99)
HCT VFR BLD AUTO: 40.1 % (ref 35.8–46.3)
HGB BLD-MCNC: 13.4 G/DL (ref 11.7–15.4)
MAGNESIUM SERPL-MCNC: 1.9 MG/DL (ref 1.8–2.4)
MCH RBC QN AUTO: 30.9 PG (ref 26.1–32.9)
MCHC RBC AUTO-ENTMCNC: 33.4 G/DL (ref 31.4–35)
MCV RBC AUTO: 92.4 FL (ref 82–102)
NRBC # BLD: 0 K/UL (ref 0–0.2)
PLATELET # BLD AUTO: 212 K/UL (ref 150–450)
PMV BLD AUTO: 10.8 FL (ref 9.4–12.3)
POTASSIUM SERPL-SCNC: 3.7 MMOL/L (ref 3.5–5.1)
RBC # BLD AUTO: 4.34 M/UL (ref 4.05–5.2)
SODIUM SERPL-SCNC: 141 MMOL/L (ref 136–145)
WBC # BLD AUTO: 8.1 K/UL (ref 4.3–11.1)

## 2024-05-22 PROCEDURE — 99152 MOD SED SAME PHYS/QHP 5/>YRS: CPT | Performed by: INTERNAL MEDICINE

## 2024-05-22 PROCEDURE — 80048 BASIC METABOLIC PNL TOTAL CA: CPT

## 2024-05-22 PROCEDURE — 92960 CARDIOVERSION ELECTRIC EXT: CPT

## 2024-05-22 PROCEDURE — 85027 COMPLETE CBC AUTOMATED: CPT

## 2024-05-22 PROCEDURE — 92960 CARDIOVERSION ELECTRIC EXT: CPT | Performed by: INTERNAL MEDICINE

## 2024-05-22 PROCEDURE — 93005 ELECTROCARDIOGRAM TRACING: CPT | Performed by: INTERNAL MEDICINE

## 2024-05-22 PROCEDURE — 6360000002 HC RX W HCPCS: Performed by: INTERNAL MEDICINE

## 2024-05-22 PROCEDURE — 83735 ASSAY OF MAGNESIUM: CPT

## 2024-05-22 PROCEDURE — 93306 TTE W/DOPPLER COMPLETE: CPT

## 2024-05-22 PROCEDURE — 99152 MOD SED SAME PHYS/QHP 5/>YRS: CPT

## 2024-05-22 RX ORDER — MIDAZOLAM HYDROCHLORIDE 1 MG/ML
INJECTION INTRAMUSCULAR; INTRAVENOUS PRN
Status: COMPLETED | OUTPATIENT
Start: 2024-05-22 | End: 2024-05-22

## 2024-05-22 RX ORDER — FENTANYL CITRATE 50 UG/ML
INJECTION, SOLUTION INTRAMUSCULAR; INTRAVENOUS PRN
Status: COMPLETED | OUTPATIENT
Start: 2024-05-22 | End: 2024-05-22

## 2024-05-22 RX ADMIN — MIDAZOLAM 1 MG: 1 INJECTION INTRAMUSCULAR; INTRAVENOUS at 11:12

## 2024-05-22 RX ADMIN — MIDAZOLAM 2 MG: 1 INJECTION INTRAMUSCULAR; INTRAVENOUS at 11:10

## 2024-05-22 RX ADMIN — FENTANYL CITRATE 50 MCG: 50 INJECTION, SOLUTION INTRAMUSCULAR; INTRAVENOUS at 11:11

## 2024-05-22 NOTE — PROGRESS NOTES
CVN complete with   Sedation start 1109  Sedation end 1119  3mg of Versed  50mcg of Fentanyl used for sedation  1 shock at 200j with conversion to NSR/SB

## 2024-06-01 DIAGNOSIS — E11.9 TYPE 2 DIABETES MELLITUS WITHOUT COMPLICATION, WITHOUT LONG-TERM CURRENT USE OF INSULIN (HCC): ICD-10-CM

## 2024-06-03 RX ORDER — SEMAGLUTIDE 1.34 MG/ML
INJECTION, SOLUTION SUBCUTANEOUS
Qty: 4 ADJUSTABLE DOSE PRE-FILLED PEN SYRINGE | Refills: 5 | Status: SHIPPED | OUTPATIENT
Start: 2024-06-03

## 2024-06-14 ENCOUNTER — OFFICE VISIT (OUTPATIENT)
Age: 74
End: 2024-06-14
Payer: MEDICARE

## 2024-06-14 VITALS
HEART RATE: 56 BPM | DIASTOLIC BLOOD PRESSURE: 62 MMHG | HEIGHT: 63 IN | SYSTOLIC BLOOD PRESSURE: 112 MMHG | BODY MASS INDEX: 38.7 KG/M2 | WEIGHT: 218.4 LBS

## 2024-06-14 DIAGNOSIS — G47.33 OSA (OBSTRUCTIVE SLEEP APNEA): ICD-10-CM

## 2024-06-14 DIAGNOSIS — I10 ESSENTIAL HYPERTENSION: ICD-10-CM

## 2024-06-14 DIAGNOSIS — E78.2 MIXED HYPERLIPIDEMIA: ICD-10-CM

## 2024-06-14 DIAGNOSIS — R60.0 BILATERAL LOWER EXTREMITY EDEMA: ICD-10-CM

## 2024-06-14 DIAGNOSIS — I48.19 PERSISTENT ATRIAL FIBRILLATION (HCC): ICD-10-CM

## 2024-06-14 DIAGNOSIS — R60.9 EDEMA, UNSPECIFIED TYPE: ICD-10-CM

## 2024-06-14 DIAGNOSIS — I48.0 PAROXYSMAL ATRIAL FIBRILLATION (HCC): Primary | ICD-10-CM

## 2024-06-14 PROCEDURE — 99214 OFFICE O/P EST MOD 30 MIN: CPT | Performed by: INTERNAL MEDICINE

## 2024-06-14 PROCEDURE — 3074F SYST BP LT 130 MM HG: CPT | Performed by: INTERNAL MEDICINE

## 2024-06-14 PROCEDURE — 1123F ACP DISCUSS/DSCN MKR DOCD: CPT | Performed by: INTERNAL MEDICINE

## 2024-06-14 PROCEDURE — 3078F DIAST BP <80 MM HG: CPT | Performed by: INTERNAL MEDICINE

## 2024-06-14 ASSESSMENT — ENCOUNTER SYMPTOMS
HEMATEMESIS: 0
HEMOPTYSIS: 0
WHEEZING: 0
EYE REDNESS: 0
ABDOMINAL PAIN: 0
HEMATOCHEZIA: 0
STRIDOR: 0
DOUBLE VISION: 0
HOARSE VOICE: 0

## 2024-06-14 NOTE — PROGRESS NOTES
Oh Pickard MD on 5/22/2024  5:11 PM   EKG from 3/26/2024 shows atrial fibrillation with controlled ventricular response with a heart rate of 69 bpm, poor R wave progression    EKG from 5/16/2024-atrial fibrillation with controlled ventricular response with a heart rate of 66 bpm, poor R wave progression, nonspecific T wave changes-personally reviewed with her.      ASSESSMENT and PLAN      Paroxysmal atrial fibrillation (HCC)  We got her through an echo which showed preserved LV systolic function, moderately dilated left atrium, and then we cardioverted her back into sinus rhythm on 5/22/2024.  We placed her on flecainide which has worked well to maintain sinus rhythm.  Continue Eliquis for thromboembolic prophylaxis    Essential hypertension  Continue current antihypertensive therapy.  Pressures are well-controlled on current dosing of lisinopril/hydrochlorothiazide 20/12.5 mg daily along with metoprolol succinate 100 mg daily    Mixed hyperlipidemia  Lipids well-controlled-continue atorvastatin 20 mg daily    KAYLAH (obstructive sleep apnea)  -Continue CPAP therapy.    Bilateral lower extremity edema  Has improved substantially.  Echo with preserved LV function.  Could be potentially aggravated initially by A-fib.  Cut back Lasix from 40 to 20 mg daily.       Overall Impression  -Maintaining sinus rhythm following her cardioversion.  Continue flecainide at current dosing along with metoprolol succinate.  Baseline heart rates in the 50s at this point.  Pressures are on the lower side so I cut back on Lasix from 40 to 20 mg daily with her being completely euvolemic.  I will see her tentatively in follow-up in 3 months time.  If systolic blood pressures are persistently below 110, we will cut back her metoprolol from 100 g daily to 75 mg equivalent daily.       On this date I have spent 35 minutes personally reviewing previous notes/outside records, test results and face to face time with the patient

## 2024-06-20 DIAGNOSIS — I48.0 PAROXYSMAL ATRIAL FIBRILLATION (HCC): ICD-10-CM

## 2024-06-25 ASSESSMENT — ENCOUNTER SYMPTOMS
VISUAL CHANGE: 0
BLURRED VISION: 0
ORTHOPNEA: 0
SHORTNESS OF BREATH: 0

## 2024-06-26 ENCOUNTER — OFFICE VISIT (OUTPATIENT)
Dept: FAMILY MEDICINE CLINIC | Facility: CLINIC | Age: 74
End: 2024-06-26
Payer: MEDICARE

## 2024-06-26 VITALS
TEMPERATURE: 97.2 F | OXYGEN SATURATION: 95 % | HEIGHT: 63 IN | RESPIRATION RATE: 16 BRPM | WEIGHT: 218 LBS | BODY MASS INDEX: 38.62 KG/M2 | SYSTOLIC BLOOD PRESSURE: 130 MMHG | DIASTOLIC BLOOD PRESSURE: 52 MMHG | HEART RATE: 57 BPM

## 2024-06-26 DIAGNOSIS — E53.8 B12 DEFICIENCY: ICD-10-CM

## 2024-06-26 DIAGNOSIS — E83.52 HYPERCALCEMIA: ICD-10-CM

## 2024-06-26 DIAGNOSIS — I10 ESSENTIAL HYPERTENSION: ICD-10-CM

## 2024-06-26 DIAGNOSIS — G47.33 OSA (OBSTRUCTIVE SLEEP APNEA): ICD-10-CM

## 2024-06-26 DIAGNOSIS — I48.19 PERSISTENT ATRIAL FIBRILLATION (HCC): ICD-10-CM

## 2024-06-26 DIAGNOSIS — E03.9 ACQUIRED HYPOTHYROIDISM: ICD-10-CM

## 2024-06-26 DIAGNOSIS — E11.9 TYPE 2 DIABETES MELLITUS WITHOUT COMPLICATION, WITHOUT LONG-TERM CURRENT USE OF INSULIN (HCC): ICD-10-CM

## 2024-06-26 DIAGNOSIS — E55.9 VITAMIN D DEFICIENCY: ICD-10-CM

## 2024-06-26 DIAGNOSIS — E78.5 HYPERLIPIDEMIA, UNSPECIFIED HYPERLIPIDEMIA TYPE: ICD-10-CM

## 2024-06-26 DIAGNOSIS — E11.9 TYPE 2 DIABETES MELLITUS WITHOUT COMPLICATION, WITHOUT LONG-TERM CURRENT USE OF INSULIN (HCC): Primary | ICD-10-CM

## 2024-06-26 PROBLEM — D68.69 SECONDARY HYPERCOAGULABLE STATE (HCC): Status: ACTIVE | Noted: 2024-06-26

## 2024-06-26 LAB
ALBUMIN SERPL-MCNC: 4 G/DL (ref 3.2–4.6)
ALBUMIN/GLOB SERPL: 1.6 (ref 1–1.9)
ALP SERPL-CCNC: 88 U/L (ref 35–104)
ALT SERPL-CCNC: 19 U/L (ref 12–65)
ANION GAP SERPL CALC-SCNC: 10 MMOL/L (ref 9–18)
AST SERPL-CCNC: 26 U/L (ref 15–37)
BILIRUB SERPL-MCNC: 0.6 MG/DL (ref 0–1.2)
BUN SERPL-MCNC: 9 MG/DL (ref 8–23)
CALCIUM SERPL-MCNC: 10.3 MG/DL (ref 8.8–10.2)
CALCIUM SERPL-MCNC: 10.4 MG/DL (ref 8.8–10.2)
CHLORIDE SERPL-SCNC: 97 MMOL/L (ref 98–107)
CHOLEST SERPL-MCNC: 155 MG/DL (ref 0–200)
CO2 SERPL-SCNC: 31 MMOL/L (ref 20–28)
CREAT SERPL-MCNC: 0.86 MG/DL (ref 0.6–1.1)
EST. AVERAGE GLUCOSE BLD GHB EST-MCNC: 114 MG/DL
GLOBULIN SER CALC-MCNC: 2.5 G/DL (ref 2.3–3.5)
GLUCOSE SERPL-MCNC: 81 MG/DL (ref 70–99)
HBA1C MFR BLD: 5.6 % (ref 0–5.6)
HDLC SERPL-MCNC: 54 MG/DL (ref 40–60)
HDLC SERPL: 2.9 (ref 0–5)
LDLC SERPL CALC-MCNC: 75 MG/DL (ref 0–100)
POTASSIUM SERPL-SCNC: 4.1 MMOL/L (ref 3.5–5.1)
PROT SERPL-MCNC: 6.5 G/DL (ref 6.3–8.2)
PTH-INTACT SERPL-MCNC: 85.4 PG/ML (ref 15–65)
SODIUM SERPL-SCNC: 138 MMOL/L (ref 136–145)
T4 FREE SERPL-MCNC: 1.5 NG/DL (ref 0.9–1.7)
TRIGL SERPL-MCNC: 132 MG/DL (ref 0–150)
TSH, 3RD GENERATION: 2.3 UIU/ML (ref 0.27–4.2)
VLDLC SERPL CALC-MCNC: 26 MG/DL (ref 6–23)

## 2024-06-26 PROCEDURE — 3075F SYST BP GE 130 - 139MM HG: CPT | Performed by: FAMILY MEDICINE

## 2024-06-26 PROCEDURE — 3078F DIAST BP <80 MM HG: CPT | Performed by: FAMILY MEDICINE

## 2024-06-26 PROCEDURE — 1123F ACP DISCUSS/DSCN MKR DOCD: CPT | Performed by: FAMILY MEDICINE

## 2024-06-26 PROCEDURE — 99214 OFFICE O/P EST MOD 30 MIN: CPT | Performed by: FAMILY MEDICINE

## 2024-06-26 RX ORDER — LEVOTHYROXINE SODIUM 88 UG/1
88 TABLET ORAL
Qty: 90 TABLET | Refills: 1 | Status: SHIPPED | OUTPATIENT
Start: 2024-06-26

## 2024-06-26 NOTE — ASSESSMENT & PLAN NOTE
Followed by cardiology -  echo which showed preserved LV systolic function, moderately dilated left atrium, and then we cardioverted her back into sinus rhythm on 5/22/2024.  We placed her on flecainide which has worked well to maintain sinus rhythm.  Continue Eliquis for thromboembolic prophylaxis

## 2024-06-26 NOTE — PROGRESS NOTES
HISTORY OF PRESENT ILLNESS  Chief Complaint   Patient presents with    Follow-up     3 mo follow up - Diabetes/A-Fib     NOTICE FOR THE PATIENT: This clinical note is not designed to be interpreted by patients.  We do not recommend reading it unless you have medical training. These notes may contain candid and (unintentionally) offensive descriptions, which are sometimes required for accurate documentation. If you would like more information about your healthcare, please obtain it directly by myself or my staff/colleagues - never solely from the notes. Thank you for your understanding and cooperation.     Got a little lightheaded on the way to the exam room.  Did cut back on the lasix.    Did fill the ozempic because in the donut.    Went to [email or web address] and got her eliquis for 80 instead of the 400 that [facility] wanted.    Previously said, \" 06/14/24 [facility] CARDIOLOGY   Paroxysmal atrial fibrillation (HCC)  We got her through an echo which showed preserved LV systolic function, moderately dilated left atrium, and then we cardioverted her back into sinus rhythm on 5/22/2024.  We placed her on flecainide which has worked well to maintain sinus rhythm.  Continue Eliquis for thromboembolic prophylaxis  Cut back Lasix from 40 to 20 mg daily.   Baseline heart rates in the 50s at this point.   If systolic blood pressures are persistently below 110, we will cut back her metoprolol from 100 g daily to 75 mg equivalent daily. \"    Face to Face Evaluation for Sleep Apnea/ CPAP Therapy    Face to Face evaluation for CPAP therapy done today.  The dangerous risks of nocturnal hypoxia were discussed including MI, CVA, and death.  The benefits of CPAP were discussed with her  including decreased SOB and decreased risk of overnight death.  She  may also have improvement in her  sleep, improvement in headaches and improved fatigue.    She  is using her  CPAP nightly.  She  states it is working well it does really help

## 2024-07-03 NOTE — RESULT ENCOUNTER NOTE
Your labs show continued elevation of your calcium.  Your intact PTH is elevated.  That combination primary hyperparathyroidism.  We need to get your bone density repeated.  Where would you like to have that done?  Make sure you are getting weightbearing exercise.  You need enough calcium and vitamin D in your diet.  Can discuss this further at your follow-up appointment.

## 2024-07-08 ENCOUNTER — CLINICAL DOCUMENTATION (OUTPATIENT)
Dept: FAMILY MEDICINE CLINIC | Facility: CLINIC | Age: 74
End: 2024-07-08

## 2024-07-08 DIAGNOSIS — Z78.0 ASYMPTOMATIC MENOPAUSE: Primary | ICD-10-CM

## 2024-07-08 NOTE — PROGRESS NOTES
Orders Placed This Encounter   Procedures    DEXA BONE DENSITY AXIAL SKELETON     Standing Status:   Future     Standing Expiration Date:   7/8/2025     Scheduling Instructions:      Isabel

## 2024-07-08 NOTE — PROGRESS NOTES
Faxed order for Bone Density test to [facility]iology scheduling for patient to be scheduled @ [facility]. Confirmation received.

## 2024-08-15 ENCOUNTER — OFFICE VISIT (OUTPATIENT)
Dept: ORTHOPEDIC SURGERY | Age: 74
End: 2024-08-15
Payer: MEDICARE

## 2024-08-15 VITALS — WEIGHT: 218 LBS | HEIGHT: 63 IN | BODY MASS INDEX: 38.62 KG/M2

## 2024-08-15 DIAGNOSIS — M51.36 DDD (DEGENERATIVE DISC DISEASE), LUMBAR: ICD-10-CM

## 2024-08-15 DIAGNOSIS — M47.816 LUMBAR SPONDYLOSIS: Primary | ICD-10-CM

## 2024-08-15 PROCEDURE — 1123F ACP DISCUSS/DSCN MKR DOCD: CPT | Performed by: PHYSICIAN ASSISTANT

## 2024-08-15 PROCEDURE — G2211 COMPLEX E/M VISIT ADD ON: HCPCS | Performed by: PHYSICIAN ASSISTANT

## 2024-08-15 PROCEDURE — 99204 OFFICE O/P NEW MOD 45 MIN: CPT | Performed by: PHYSICIAN ASSISTANT

## 2024-08-15 RX ORDER — METHYLPREDNISOLONE 4 MG/1
TABLET ORAL
Qty: 1 KIT | Refills: 0 | Status: SHIPPED | OUTPATIENT
Start: 2024-08-15

## 2024-08-15 NOTE — PROGRESS NOTES
Name: Lisbeth Reyes  YOB: 1950  Gender: female  MRN: [id number]    CC:   Chief Complaint   Patient presents with    New Patient     Low back pain          HPI:   Lisbeth Reyes is a 74 y.o. female with a PMHx of diabetes last A1c 5.6, recent diagnosis of A-fib on Eliquis recently cardioverted.,  Other comorbidities below.      They present here for evaluation of back pain rating to the right buttock and right lower leg.  This been going on for few months off-and-on.  She initially was taking some meloxicam but had to come off that when she started the Eliquis.  She also has tried a TENS unit at home.  She occasionally has pain rating down to the lower leg and shin area.  No specific injury or trauma at the onset.      Past Medical History Includes:   Past Medical History:   Diagnosis Date    Acid reflux     Allergic rhinitis, cause unspecified 2013    Arthritis     OA- hips and knees    Back pain 2013    Basal cell carcinoma of cheek 2013    Elevated blood uric acid level 2015    Hyperlipidemia     Hypertension     Malaise and fatigue 2013    Personal history of anxiety disorder 2015    Sleep apnea 2015    Unspecified hypothyroidism 2015   ,   Past Surgical History:   Procedure Laterality Date    BREAST REDUCTION SURGERY  2013    BREAST REDUCTION performed by SHAMA Fry MD at [facility]    CYST REMOVAL      ORTHOPEDIC SURGERY      gell injected into knee    OTHER SURGICAL HISTORY      ganglion cyst removed right wrist     DAPHNIE AND BSO (CERVIX REMOVED)       Family History:   Family History   Problem Relation Age of Onset    Lung Disease Brother     Cancer Neg Hx     Diabetes Mother     Hypertension Mother     COPD Mother       Social History:   Social History     Tobacco Use    Smoking status: Former     Current packs/day: 0.00     Types: Cigarettes     Quit date: 2005     Years since quittin.5    Smokeless tobacco:

## 2024-08-16 DIAGNOSIS — E55.9 VITAMIN D DEFICIENCY, UNSPECIFIED: ICD-10-CM

## 2024-08-16 RX ORDER — ERGOCALCIFEROL 1.25 MG/1
CAPSULE ORAL
Qty: 12 CAPSULE | Refills: 3 | Status: SHIPPED | OUTPATIENT
Start: 2024-08-16

## 2024-09-12 DIAGNOSIS — I48.0 PAROXYSMAL ATRIAL FIBRILLATION (HCC): ICD-10-CM

## 2024-09-20 ENCOUNTER — OFFICE VISIT (OUTPATIENT)
Age: 74
End: 2024-09-20
Payer: MEDICARE

## 2024-09-20 VITALS
WEIGHT: 220 LBS | HEART RATE: 56 BPM | DIASTOLIC BLOOD PRESSURE: 54 MMHG | BODY MASS INDEX: 38.98 KG/M2 | SYSTOLIC BLOOD PRESSURE: 126 MMHG | HEIGHT: 63 IN

## 2024-09-20 DIAGNOSIS — I10 ESSENTIAL (PRIMARY) HYPERTENSION: ICD-10-CM

## 2024-09-20 DIAGNOSIS — R00.2 PALPITATIONS: ICD-10-CM

## 2024-09-20 DIAGNOSIS — I48.0 PAROXYSMAL ATRIAL FIBRILLATION (HCC): Primary | ICD-10-CM

## 2024-09-20 DIAGNOSIS — E78.2 MIXED HYPERLIPIDEMIA: ICD-10-CM

## 2024-09-20 DIAGNOSIS — G47.33 OSA (OBSTRUCTIVE SLEEP APNEA): ICD-10-CM

## 2024-09-20 DIAGNOSIS — I48.19 PERSISTENT ATRIAL FIBRILLATION (HCC): ICD-10-CM

## 2024-09-20 DIAGNOSIS — I10 ESSENTIAL HYPERTENSION: ICD-10-CM

## 2024-09-20 DIAGNOSIS — R60.0 BILATERAL LOWER EXTREMITY EDEMA: ICD-10-CM

## 2024-09-20 PROCEDURE — 99214 OFFICE O/P EST MOD 30 MIN: CPT | Performed by: INTERNAL MEDICINE

## 2024-09-20 PROCEDURE — 3078F DIAST BP <80 MM HG: CPT | Performed by: INTERNAL MEDICINE

## 2024-09-20 PROCEDURE — 3074F SYST BP LT 130 MM HG: CPT | Performed by: INTERNAL MEDICINE

## 2024-09-20 PROCEDURE — 1123F ACP DISCUSS/DSCN MKR DOCD: CPT | Performed by: INTERNAL MEDICINE

## 2024-09-20 RX ORDER — FLECAINIDE ACETATE 50 MG/1
50 TABLET ORAL 2 TIMES DAILY
Qty: 60 TABLET | Refills: 11 | Status: SHIPPED | OUTPATIENT
Start: 2024-09-20

## 2024-09-20 RX ORDER — METOPROLOL SUCCINATE 100 MG/1
100 TABLET, EXTENDED RELEASE ORAL DAILY
Qty: 90 TABLET | Refills: 2 | Status: SHIPPED | OUTPATIENT
Start: 2024-09-20

## 2024-09-20 ASSESSMENT — ENCOUNTER SYMPTOMS
EYE REDNESS: 0
DOUBLE VISION: 0
HEMATEMESIS: 0
HEMATOCHEZIA: 0
ABDOMINAL PAIN: 0
HEMOPTYSIS: 0
STRIDOR: 0
HOARSE VOICE: 0
WHEEZING: 0

## 2024-10-12 SDOH — HEALTH STABILITY: PHYSICAL HEALTH: ON AVERAGE, HOW MANY DAYS PER WEEK DO YOU ENGAGE IN MODERATE TO STRENUOUS EXERCISE (LIKE A BRISK WALK)?: 0 DAYS

## 2024-10-12 SDOH — HEALTH STABILITY: PHYSICAL HEALTH: ON AVERAGE, HOW MANY MINUTES DO YOU ENGAGE IN EXERCISE AT THIS LEVEL?: 0 MIN

## 2024-10-12 ASSESSMENT — PATIENT HEALTH QUESTIONNAIRE - PHQ9
SUM OF ALL RESPONSES TO PHQ9 QUESTIONS 1 & 2: 1
SUM OF ALL RESPONSES TO PHQ QUESTIONS 1-9: 1
SUM OF ALL RESPONSES TO PHQ QUESTIONS 1-9: 1
1. LITTLE INTEREST OR PLEASURE IN DOING THINGS: SEVERAL DAYS
SUM OF ALL RESPONSES TO PHQ QUESTIONS 1-9: 1
SUM OF ALL RESPONSES TO PHQ QUESTIONS 1-9: 1
2. FEELING DOWN, DEPRESSED OR HOPELESS: NOT AT ALL

## 2024-10-12 ASSESSMENT — LIFESTYLE VARIABLES
HOW OFTEN DO YOU HAVE A DRINK CONTAINING ALCOHOL: 2-4 TIMES A MONTH
HOW MANY STANDARD DRINKS CONTAINING ALCOHOL DO YOU HAVE ON A TYPICAL DAY: 1 OR 2
HOW OFTEN DO YOU HAVE SIX OR MORE DRINKS ON ONE OCCASION: 1
HOW OFTEN DO YOU HAVE A DRINK CONTAINING ALCOHOL: 3
HOW MANY STANDARD DRINKS CONTAINING ALCOHOL DO YOU HAVE ON A TYPICAL DAY: 1

## 2024-10-15 ENCOUNTER — OFFICE VISIT (OUTPATIENT)
Dept: FAMILY MEDICINE CLINIC | Facility: CLINIC | Age: 74
End: 2024-10-15
Payer: MEDICARE

## 2024-10-15 VITALS
HEIGHT: 63 IN | SYSTOLIC BLOOD PRESSURE: 113 MMHG | WEIGHT: 219 LBS | TEMPERATURE: 97 F | DIASTOLIC BLOOD PRESSURE: 47 MMHG | OXYGEN SATURATION: 96 % | RESPIRATION RATE: 17 BRPM | HEART RATE: 55 BPM | BODY MASS INDEX: 38.8 KG/M2

## 2024-10-15 DIAGNOSIS — E53.8 B12 DEFICIENCY: ICD-10-CM

## 2024-10-15 DIAGNOSIS — J30.1 NON-SEASONAL ALLERGIC RHINITIS DUE TO POLLEN: ICD-10-CM

## 2024-10-15 DIAGNOSIS — E03.9 ACQUIRED HYPOTHYROIDISM: ICD-10-CM

## 2024-10-15 DIAGNOSIS — Z00.00 MEDICARE ANNUAL WELLNESS VISIT, SUBSEQUENT: Primary | ICD-10-CM

## 2024-10-15 DIAGNOSIS — I10 ESSENTIAL HYPERTENSION: ICD-10-CM

## 2024-10-15 DIAGNOSIS — E11.9 TYPE 2 DIABETES MELLITUS WITHOUT COMPLICATION, WITHOUT LONG-TERM CURRENT USE OF INSULIN (HCC): ICD-10-CM

## 2024-10-15 DIAGNOSIS — R60.9 EDEMA, UNSPECIFIED TYPE: ICD-10-CM

## 2024-10-15 DIAGNOSIS — F41.1 GAD (GENERALIZED ANXIETY DISORDER): ICD-10-CM

## 2024-10-15 DIAGNOSIS — E55.9 VITAMIN D DEFICIENCY, UNSPECIFIED: ICD-10-CM

## 2024-10-15 DIAGNOSIS — K21.9 GASTROESOPHAGEAL REFLUX DISEASE WITHOUT ESOPHAGITIS: ICD-10-CM

## 2024-10-15 PROCEDURE — 3074F SYST BP LT 130 MM HG: CPT | Performed by: FAMILY MEDICINE

## 2024-10-15 PROCEDURE — 1123F ACP DISCUSS/DSCN MKR DOCD: CPT | Performed by: FAMILY MEDICINE

## 2024-10-15 PROCEDURE — 99214 OFFICE O/P EST MOD 30 MIN: CPT | Performed by: FAMILY MEDICINE

## 2024-10-15 PROCEDURE — 3078F DIAST BP <80 MM HG: CPT | Performed by: FAMILY MEDICINE

## 2024-10-15 PROCEDURE — G0439 PPPS, SUBSEQ VISIT: HCPCS | Performed by: FAMILY MEDICINE

## 2024-10-15 PROCEDURE — 3044F HG A1C LEVEL LT 7.0%: CPT | Performed by: FAMILY MEDICINE

## 2024-10-15 RX ORDER — ERGOCALCIFEROL 1.25 MG/1
CAPSULE, LIQUID FILLED ORAL
Qty: 12 CAPSULE | Refills: 3 | Status: SHIPPED | OUTPATIENT
Start: 2024-10-15

## 2024-10-15 RX ORDER — METFORMIN HYDROCHLORIDE 500 MG/1
TABLET, EXTENDED RELEASE ORAL
COMMUNITY
Start: 2024-09-13

## 2024-10-15 RX ORDER — SEMAGLUTIDE 1.34 MG/ML
INJECTION, SOLUTION SUBCUTANEOUS
Qty: 4 ADJUSTABLE DOSE PRE-FILLED PEN SYRINGE | Refills: 5 | Status: SHIPPED | OUTPATIENT
Start: 2024-10-15

## 2024-10-15 RX ORDER — MONTELUKAST SODIUM 10 MG/1
TABLET ORAL
Qty: 90 TABLET | Refills: 2 | Status: SHIPPED | OUTPATIENT
Start: 2024-10-15

## 2024-10-15 RX ORDER — LEVOTHYROXINE SODIUM 88 UG/1
88 TABLET ORAL
Qty: 90 TABLET | Refills: 1 | Status: SHIPPED | OUTPATIENT
Start: 2024-10-15

## 2024-10-15 RX ORDER — LISINOPRIL AND HYDROCHLOROTHIAZIDE 12.5; 2 MG/1; MG/1
1 TABLET ORAL DAILY
Qty: 90 TABLET | Refills: 2 | Status: SHIPPED | OUTPATIENT
Start: 2024-10-15

## 2024-10-15 RX ORDER — FUROSEMIDE 20 MG/1
TABLET ORAL
Qty: 180 TABLET | Refills: 2 | Status: SHIPPED | OUTPATIENT
Start: 2024-10-15

## 2024-10-15 RX ORDER — CYANOCOBALAMIN 1000 UG/ML
INJECTION, SOLUTION INTRAMUSCULAR; SUBCUTANEOUS
Qty: 10 ML | Refills: 5 | Status: SHIPPED | OUTPATIENT
Start: 2024-10-15

## 2024-10-15 NOTE — PATIENT INSTRUCTIONS
Learning About Being Active as an Older Adult  Why is being active important as you get older?     Being active is one of the best things you can do for your health. And it's never too late to start. Being active--or getting active, if you aren't already--has definite benefits. It can:  Give you more energy,  Keep your mind sharp.  Improve balance to reduce your risk of falls.  Help you manage chronic illness with fewer medicines.  No matter how old you are, how fit you are, or what health problems you have, there is a form of activity that will work for you. And the more physical activity you can do, the better your overall health will be.  What kinds of activity can help you stay healthy?  Being more active will make your daily activities easier. Physical activity includes planned exercise and things you do in daily life. There are four types of activity:  Aerobic.  Doing aerobic activity makes your heart and lungs strong.  Includes walking, dancing, and gardening.  Aim for at least 2½ hours spread throughout the week.  It improves your energy and can help you sleep better.  Muscle-strengthening.  This type of activity can help maintain muscle and strengthen bones.  Includes climbing stairs, using resistance bands, and lifting or carrying heavy loads.  Aim for at least twice a week.  It can help protect the knees and other joints.  Stretching.  Stretching gives you better range of motion in joints and muscles.  Includes upper arm stretches, calf stretches, and gentle yoga.  Aim for at least twice a week, preferably after your muscles are warmed up from other activities.  It can help you function better in daily life.  Balancing.  This helps you stay coordinated and have good posture.  Includes heel-to-toe walking, grady chi, and certain types of yoga.  Aim for at least 3 days a week.  It can reduce your risk of falling.  Even if you have a hard time meeting the recommendations, it's better to be more active

## 2024-10-17 ENCOUNTER — LAB (OUTPATIENT)
Dept: FAMILY MEDICINE CLINIC | Facility: CLINIC | Age: 74
End: 2024-10-17

## 2024-10-17 DIAGNOSIS — E83.52 HYPERCALCEMIA: ICD-10-CM

## 2024-10-17 DIAGNOSIS — E83.52 HYPERCALCEMIA: Primary | ICD-10-CM

## 2024-10-17 LAB
CALCIUM SERPL-MCNC: 10.2 MG/DL (ref 8.8–10.2)
PTH-INTACT SERPL-MCNC: 92 PG/ML (ref 15–65)

## 2024-10-17 NOTE — PROGRESS NOTES
Orders Placed This Encounter   Procedures    PTH, Intact     Standing Status:   Future     Standing Expiration Date:   10/17/2025

## 2024-10-18 NOTE — RESULT ENCOUNTER NOTE
Your labs show that your parathyroid gland is slightly overactive.  That gland regulates calcium in the body.  We should recheck this at your next visit.

## 2024-11-04 ENCOUNTER — OFFICE VISIT (OUTPATIENT)
Dept: ORTHOPEDIC SURGERY | Age: 74
End: 2024-11-04
Payer: MEDICARE

## 2024-11-04 ENCOUNTER — TELEPHONE (OUTPATIENT)
Dept: ORTHOPEDIC SURGERY | Age: 74
End: 2024-11-04

## 2024-11-04 VITALS — BODY MASS INDEX: 38.8 KG/M2 | WEIGHT: 219 LBS | HEIGHT: 63 IN

## 2024-11-04 DIAGNOSIS — M47.816 LUMBAR SPONDYLOSIS: Primary | ICD-10-CM

## 2024-11-04 PROCEDURE — G2211 COMPLEX E/M VISIT ADD ON: HCPCS | Performed by: PHYSICIAN ASSISTANT

## 2024-11-04 PROCEDURE — 99214 OFFICE O/P EST MOD 30 MIN: CPT | Performed by: PHYSICIAN ASSISTANT

## 2024-11-04 PROCEDURE — 1123F ACP DISCUSS/DSCN MKR DOCD: CPT | Performed by: PHYSICIAN ASSISTANT

## 2024-11-04 RX ORDER — TIZANIDINE 2 MG/1
2 TABLET ORAL 3 TIMES DAILY PRN
Qty: 90 TABLET | Refills: 2 | Status: SHIPPED | OUTPATIENT
Start: 2024-11-04

## 2024-11-04 NOTE — TELEPHONE ENCOUNTER
LUMBAR SPINE APPROVAL     Summary of Your Request   Please review the details of your request below and if everything looks correct click CONTINUE  Your case has been Approved.  The prior authorization you submitted, Case [id number], has been received. Additional case status notifications will be sent if you opted in for email notifications. Thank you.          Provider Name: SANDERYARELIS DELGADO Contact: bindu   Provider Address: [address] [place] [address] Phone Number: [phone]      Fax Number: [phone]      Patient Name: MOLLY KRUGER Patient Id: [id number]   Insurance Carrier: [facility] hospital        Site [facility]: [facility] Site ID: [id number]   Site Address: [address] [place] [address]          Primary Diagnosis Code: M47.816 Description: Spondylosis without myelopathy or radiculopathy, lumbar region   Secondary Diagnosis Code:  Description:    Date of Service: Not provided     CPT Code: 56187 Description: MRI L SPINE W/O CONTRAST   Authorization Number: [id number]     Case Number: [id number]     Review Date: 11/4/2024 1:57:33 PM     Expiration Date: 5/3/2025     Status: Your case has been Approved.  The prior authorization you submitted, Case [id number], has been received. Additional case status notifications will be sent if you opted in for email notifications. Thank you

## 2024-11-04 NOTE — PROGRESS NOTES
patient's single, serious, or complex PAIN condition with the following diagnoses: Lumbar spondylosis   Chronic Pain Condition that is not stable = not at the patient's treatment goal

## 2024-11-08 ENCOUNTER — OFFICE VISIT (OUTPATIENT)
Dept: SLEEP MEDICINE | Age: 74
End: 2024-11-08

## 2024-11-08 VITALS
OXYGEN SATURATION: 93 % | RESPIRATION RATE: 17 BRPM | DIASTOLIC BLOOD PRESSURE: 82 MMHG | HEIGHT: 63 IN | SYSTOLIC BLOOD PRESSURE: 148 MMHG | BODY MASS INDEX: 39.51 KG/M2 | HEART RATE: 62 BPM | WEIGHT: 223 LBS

## 2024-11-08 DIAGNOSIS — E66.9 OBESITY (BMI 35.0-39.9 WITHOUT COMORBIDITY): ICD-10-CM

## 2024-11-08 DIAGNOSIS — G47.33 OSA (OBSTRUCTIVE SLEEP APNEA): Primary | ICD-10-CM

## 2024-11-08 ASSESSMENT — SLEEP AND FATIGUE QUESTIONNAIRES
HOW LIKELY ARE YOU TO NOD OFF OR FALL ASLEEP WHILE WATCHING TV: SLIGHT CHANCE OF DOZING
HOW LIKELY ARE YOU TO NOD OFF OR FALL ASLEEP IN A CAR, WHILE STOPPED FOR A FEW MINUTES IN TRAFFIC: WOULD NEVER DOZE
HOW LIKELY ARE YOU TO NOD OFF OR FALL ASLEEP WHILE SITTING AND READING: MODERATE CHANCE OF DOZING
HOW LIKELY ARE YOU TO NOD OFF OR FALL ASLEEP WHILE SITTING INACTIVE IN A PUBLIC PLACE: SLIGHT CHANCE OF DOZING
HOW LIKELY ARE YOU TO NOD OFF OR FALL ASLEEP WHEN YOU ARE A PASSENGER IN A CAR FOR AN HOUR WITHOUT A BREAK: MODERATE CHANCE OF DOZING
HOW LIKELY ARE YOU TO NOD OFF OR FALL ASLEEP WHILE LYING DOWN TO REST IN THE AFTERNOON WHEN CIRCUMSTANCES PERMIT: HIGH CHANCE OF DOZING
ESS TOTAL SCORE: 10
HOW LIKELY ARE YOU TO NOD OFF OR FALL ASLEEP WHILE SITTING AND TALKING TO SOMEONE: WOULD NEVER DOZE
HOW LIKELY ARE YOU TO NOD OFF OR FALL ASLEEP WHILE SITTING QUIETLY AFTER LUNCH WITHOUT ALCOHOL: SLIGHT CHANCE OF DOZING

## 2024-11-08 NOTE — PATIENT INSTRUCTIONS
Continue CPAP 7-15 cm H2O nightly compliance  New CPAP supplies ordered  Recommendations as above  Follow-up in 1 year or sooner if needed

## 2024-11-08 NOTE — PROGRESS NOTES
[facility]  [address]  [place] [address]  [phone]    Patient Name:  Lisbeth Reyes  YOB: 1950      Office Visit 11/8/2024    CHIEF COMPLAINT:    Chief Complaint   Patient presents with    Sleep Apnea         HISTORY OF PRESENT ILLNESS:  Patient is a 74 y.o. female seen today for follow up of KAYLAH. Diagnostic sleep study on 03/20/2015 with an AHI of 14.8 and lowest oxygen saturation of 60%. She is prescribed cpap therapy with a humidifier set at 7-15 cm with a full face mask. Most recent download reveals AHI on PAP therapy is 1.2, leak is median 0.0 and 3.4 at 95th percentile and the hourly usage is 10 hours 50 minutes nightly. The overall use is 3443 hours with days greater than four hours at 317/365. The patient is compliant with the Pap therapy and is feeling better as a result.   Her compliance with CPAP over the last year has been very good.  She reports that she awakens in the morning refreshed and denies any excessive daytime sleepiness and fatigue.  Leedey score is 10/24.  She denies any major medical changes over the last year.  Reports that her weight has consistently been around 220 pounds.  Her blood pressure is well-controlled today.    Leedey Sleepiness Scale      11/8/2024    11:20 AM 11/8/2023    10:57 AM 11/8/2022     9:10 AM 11/9/2021    10:00 AM   Sleep Medicine   Sitting and reading 2 1 0 0   Watching TV 1 1 0 2   Sitting, inactive in a public place (e.g. a theatre or a meeting) 1 0 0 0   As a passenger in a car for an hour without a break 2 2 0 2   Lying down to rest in the afternoon when circumstances permit 3 3 2 2   Sitting and talking to someone 0 0 0 0   Sitting quietly after a lunch without alcohol 1 1 0 0   In a car, while stopped for a few minutes in traffic 0 0 0 0   Leedey Sleepiness Score 10 8 2 6          Past Medical History:   Diagnosis Date    Acid reflux     Allergic rhinitis, cause unspecified 2/8/2013

## 2024-11-10 ENCOUNTER — HOSPITAL ENCOUNTER (INPATIENT)
Age: 74
LOS: 1 days | Discharge: HOME | DRG: 418 | End: 2024-11-12
Attending: STUDENT IN AN ORGANIZED HEALTH CARE EDUCATION/TRAINING PROGRAM | Admitting: SURGERY
Payer: MEDICARE

## 2024-11-10 DIAGNOSIS — K81.9 CHOLECYSTITIS: ICD-10-CM

## 2024-11-10 DIAGNOSIS — K81.0 ACUTE CHOLECYSTITIS: ICD-10-CM

## 2024-11-10 LAB
ALBUMIN SERPL-MCNC: 4 G/DL (ref 3.2–4.6)
ALBUMIN/GLOB SERPL: 1.3 (ref 1–1.9)
ALP SERPL-CCNC: 89 U/L (ref 35–104)
ALT SERPL-CCNC: 13 U/L (ref 8–45)
ANION GAP SERPL CALC-SCNC: 13 MMOL/L (ref 7–16)
AST SERPL-CCNC: 19 U/L (ref 15–37)
BASOPHILS # BLD: 0.1 K/UL (ref 0–0.2)
BASOPHILS NFR BLD: 1 % (ref 0–2)
BILIRUB SERPL-MCNC: 0.6 MG/DL (ref 0–1.2)
BUN SERPL-MCNC: 9 MG/DL (ref 8–23)
CALCIUM SERPL-MCNC: 10.2 MG/DL (ref 8.8–10.2)
CHLORIDE SERPL-SCNC: 102 MMOL/L (ref 98–107)
CO2 SERPL-SCNC: 24 MMOL/L (ref 20–29)
CREAT SERPL-MCNC: 0.85 MG/DL (ref 0.6–1.1)
DIFFERENTIAL METHOD BLD: ABNORMAL
EOSINOPHIL # BLD: 0.1 K/UL (ref 0–0.8)
EOSINOPHIL NFR BLD: 1 % (ref 0.5–7.8)
ERYTHROCYTE [DISTWIDTH] IN BLOOD BY AUTOMATED COUNT: 12.6 % (ref 11.9–14.6)
GLOBULIN SER CALC-MCNC: 3 G/DL (ref 2.3–3.5)
GLUCOSE SERPL-MCNC: 133 MG/DL (ref 70–99)
HCT VFR BLD AUTO: 42.2 % (ref 35.8–46.3)
HGB BLD-MCNC: 14 G/DL (ref 11.7–15.4)
IMM GRANULOCYTES # BLD AUTO: 0.1 K/UL (ref 0–0.5)
IMM GRANULOCYTES NFR BLD AUTO: 1 % (ref 0–5)
LACTATE SERPL-SCNC: 1.2 MMOL/L (ref 0.5–2)
LIPASE SERPL-CCNC: 27 U/L (ref 13–60)
LYMPHOCYTES # BLD: 1.3 K/UL (ref 0.5–4.6)
LYMPHOCYTES NFR BLD: 8 % (ref 13–44)
MCH RBC QN AUTO: 30.8 PG (ref 26.1–32.9)
MCHC RBC AUTO-ENTMCNC: 33.2 G/DL (ref 31.4–35)
MCV RBC AUTO: 93 FL (ref 82–102)
MONOCYTES # BLD: 1.1 K/UL (ref 0.1–1.3)
MONOCYTES NFR BLD: 7 % (ref 4–12)
NEUTS SEG # BLD: 14.4 K/UL (ref 1.7–8.2)
NEUTS SEG NFR BLD: 82 % (ref 43–78)
NRBC # BLD: 0 K/UL (ref 0–0.2)
PLATELET # BLD AUTO: 214 K/UL (ref 150–450)
PMV BLD AUTO: 11.1 FL (ref 9.4–12.3)
POTASSIUM SERPL-SCNC: 3.6 MMOL/L (ref 3.5–5.1)
PROT SERPL-MCNC: 7 G/DL (ref 6.3–8.2)
RBC # BLD AUTO: 4.54 M/UL (ref 4.05–5.2)
SODIUM SERPL-SCNC: 139 MMOL/L (ref 136–145)
WBC # BLD AUTO: 17 K/UL (ref 4.3–11.1)

## 2024-11-10 PROCEDURE — 85025 COMPLETE CBC W/AUTO DIFF WBC: CPT

## 2024-11-10 PROCEDURE — 83690 ASSAY OF LIPASE: CPT

## 2024-11-10 PROCEDURE — 96375 TX/PRO/DX INJ NEW DRUG ADDON: CPT

## 2024-11-10 PROCEDURE — 83605 ASSAY OF LACTIC ACID: CPT

## 2024-11-10 PROCEDURE — 2580000003 HC RX 258: Performed by: STUDENT IN AN ORGANIZED HEALTH CARE EDUCATION/TRAINING PROGRAM

## 2024-11-10 PROCEDURE — 6360000002 HC RX W HCPCS: Performed by: STUDENT IN AN ORGANIZED HEALTH CARE EDUCATION/TRAINING PROGRAM

## 2024-11-10 PROCEDURE — 99285 EMERGENCY DEPT VISIT HI MDM: CPT

## 2024-11-10 PROCEDURE — 80053 COMPREHEN METABOLIC PANEL: CPT

## 2024-11-10 RX ORDER — MORPHINE SULFATE 4 MG/ML
4 INJECTION, SOLUTION INTRAMUSCULAR; INTRAVENOUS
Status: COMPLETED | OUTPATIENT
Start: 2024-11-10 | End: 2024-11-10

## 2024-11-10 RX ORDER — ONDANSETRON 2 MG/ML
4 INJECTION INTRAMUSCULAR; INTRAVENOUS ONCE
Status: COMPLETED | OUTPATIENT
Start: 2024-11-10 | End: 2024-11-10

## 2024-11-10 RX ADMIN — PANTOPRAZOLE SODIUM 40 MG: 40 INJECTION, POWDER, FOR SOLUTION INTRAVENOUS at 23:29

## 2024-11-10 RX ADMIN — ONDANSETRON 4 MG: 2 INJECTION INTRAMUSCULAR; INTRAVENOUS at 23:29

## 2024-11-10 RX ADMIN — MORPHINE SULFATE 4 MG: 4 INJECTION INTRAVENOUS at 23:29

## 2024-11-10 ASSESSMENT — PAIN - FUNCTIONAL ASSESSMENT: PAIN_FUNCTIONAL_ASSESSMENT: 0-10

## 2024-11-10 ASSESSMENT — LIFESTYLE VARIABLES
HOW MANY STANDARD DRINKS CONTAINING ALCOHOL DO YOU HAVE ON A TYPICAL DAY: 1 OR 2
HOW OFTEN DO YOU HAVE A DRINK CONTAINING ALCOHOL: 2-4 TIMES A MONTH

## 2024-11-10 ASSESSMENT — PAIN SCALES - GENERAL
PAINLEVEL_OUTOF10: 10
PAINLEVEL_OUTOF10: 10

## 2024-11-10 ASSESSMENT — PAIN DESCRIPTION - DESCRIPTORS
DESCRIPTORS: SHARP
DESCRIPTORS: ACHING

## 2024-11-10 ASSESSMENT — PAIN DESCRIPTION - LOCATION
LOCATION: ABDOMEN
LOCATION: ABDOMEN;BACK

## 2024-11-11 ENCOUNTER — APPOINTMENT (OUTPATIENT)
Dept: GENERAL RADIOLOGY | Age: 74
DRG: 418 | End: 2024-11-11
Payer: MEDICARE

## 2024-11-11 ENCOUNTER — ANESTHESIA (OUTPATIENT)
Dept: SURGERY | Age: 74
DRG: 418 | End: 2024-11-11
Payer: MEDICARE

## 2024-11-11 ENCOUNTER — APPOINTMENT (OUTPATIENT)
Dept: CT IMAGING | Age: 74
DRG: 418 | End: 2024-11-11
Payer: MEDICARE

## 2024-11-11 ENCOUNTER — APPOINTMENT (OUTPATIENT)
Dept: ULTRASOUND IMAGING | Age: 74
DRG: 418 | End: 2024-11-11
Payer: MEDICARE

## 2024-11-11 ENCOUNTER — ANESTHESIA EVENT (OUTPATIENT)
Dept: SURGERY | Age: 74
DRG: 418 | End: 2024-11-11
Payer: MEDICARE

## 2024-11-11 PROBLEM — K81.0 ACUTE CHOLECYSTITIS: Status: ACTIVE | Noted: 2024-11-11

## 2024-11-11 LAB
APPEARANCE UR: CLEAR
BACTERIA URNS QL MICRO: ABNORMAL /HPF
BILIRUB UR QL: NEGATIVE
COLOR UR: ABNORMAL
CRYSTALS URNS QL MICRO: ABNORMAL /LPF
GLUCOSE BLD STRIP.AUTO-MCNC: 91 MG/DL (ref 65–100)
GLUCOSE UR STRIP.AUTO-MCNC: NEGATIVE MG/DL
HGB UR QL STRIP: ABNORMAL
KETONES UR QL STRIP.AUTO: 15 MG/DL
LEUKOCYTE ESTERASE UR QL STRIP.AUTO: ABNORMAL
NITRITE UR QL STRIP.AUTO: NEGATIVE
OTHER OBSERVATIONS: ABNORMAL
PH UR STRIP: 5.5 (ref 5–9)
PROT UR STRIP-MCNC: ABNORMAL MG/DL
RBC #/AREA URNS HPF: ABNORMAL /HPF
SERVICE CMNT-IMP: NORMAL
SP GR UR REFRACTOMETRY: 1.02 (ref 1–1.02)
UROBILINOGEN UR QL STRIP.AUTO: 0.2 EU/DL (ref 0.2–1)
WBC URNS QL MICRO: ABNORMAL /HPF

## 2024-11-11 PROCEDURE — 3700000000 HC ANESTHESIA ATTENDED CARE: Performed by: SURGERY

## 2024-11-11 PROCEDURE — 6360000002 HC RX W HCPCS: Performed by: SURGERY

## 2024-11-11 PROCEDURE — 6360000002 HC RX W HCPCS: Performed by: NURSE ANESTHETIST, CERTIFIED REGISTERED

## 2024-11-11 PROCEDURE — 81001 URINALYSIS AUTO W/SCOPE: CPT

## 2024-11-11 PROCEDURE — 94760 N-INVAS EAR/PLS OXIMETRY 1: CPT

## 2024-11-11 PROCEDURE — 6360000002 HC RX W HCPCS: Performed by: NURSE PRACTITIONER

## 2024-11-11 PROCEDURE — 2500000003 HC RX 250 WO HCPCS: Performed by: NURSE ANESTHETIST, CERTIFIED REGISTERED

## 2024-11-11 PROCEDURE — 6360000002 HC RX W HCPCS

## 2024-11-11 PROCEDURE — 7100000001 HC PACU RECOVERY - ADDTL 15 MIN: Performed by: SURGERY

## 2024-11-11 PROCEDURE — 2580000003 HC RX 258

## 2024-11-11 PROCEDURE — 6370000000 HC RX 637 (ALT 250 FOR IP): Performed by: ANESTHESIOLOGY

## 2024-11-11 PROCEDURE — 96375 TX/PRO/DX INJ NEW DRUG ADDON: CPT

## 2024-11-11 PROCEDURE — 88304 TISSUE EXAM BY PATHOLOGIST: CPT

## 2024-11-11 PROCEDURE — 96365 THER/PROPH/DIAG IV INF INIT: CPT

## 2024-11-11 PROCEDURE — 1100000000 HC RM PRIVATE

## 2024-11-11 PROCEDURE — 96376 TX/PRO/DX INJ SAME DRUG ADON: CPT

## 2024-11-11 PROCEDURE — 2580000003 HC RX 258: Performed by: NURSE PRACTITIONER

## 2024-11-11 PROCEDURE — 3700000001 HC ADD 15 MINUTES (ANESTHESIA): Performed by: SURGERY

## 2024-11-11 PROCEDURE — 0FT44ZZ RESECTION OF GALLBLADDER, PERCUTANEOUS ENDOSCOPIC APPROACH: ICD-10-PCS | Performed by: SURGERY

## 2024-11-11 PROCEDURE — 71045 X-RAY EXAM CHEST 1 VIEW: CPT

## 2024-11-11 PROCEDURE — 7100000000 HC PACU RECOVERY - FIRST 15 MIN: Performed by: SURGERY

## 2024-11-11 PROCEDURE — 6360000002 HC RX W HCPCS: Performed by: STUDENT IN AN ORGANIZED HEALTH CARE EDUCATION/TRAINING PROGRAM

## 2024-11-11 PROCEDURE — 2709999900 HC NON-CHARGEABLE SUPPLY: Performed by: SURGERY

## 2024-11-11 PROCEDURE — 2500000003 HC RX 250 WO HCPCS: Performed by: NURSE PRACTITIONER

## 2024-11-11 PROCEDURE — 76705 ECHO EXAM OF ABDOMEN: CPT

## 2024-11-11 PROCEDURE — 2720000010 HC SURG SUPPLY STERILE: Performed by: SURGERY

## 2024-11-11 PROCEDURE — 3600000004 HC SURGERY LEVEL 4 BASE: Performed by: SURGERY

## 2024-11-11 PROCEDURE — 3600000014 HC SURGERY LEVEL 4 ADDTL 15MIN: Performed by: SURGERY

## 2024-11-11 PROCEDURE — 2580000003 HC RX 258: Performed by: PHYSICIAN ASSISTANT

## 2024-11-11 PROCEDURE — 82962 GLUCOSE BLOOD TEST: CPT

## 2024-11-11 PROCEDURE — 47562 LAPAROSCOPIC CHOLECYSTECTOMY: CPT | Performed by: SURGERY

## 2024-11-11 PROCEDURE — 6360000002 HC RX W HCPCS: Performed by: PHYSICIAN ASSISTANT

## 2024-11-11 PROCEDURE — 94761 N-INVAS EAR/PLS OXIMETRY MLT: CPT

## 2024-11-11 PROCEDURE — 74177 CT ABD & PELVIS W/CONTRAST: CPT

## 2024-11-11 PROCEDURE — 6370000000 HC RX 637 (ALT 250 FOR IP)

## 2024-11-11 PROCEDURE — 6360000004 HC RX CONTRAST MEDICATION: Performed by: STUDENT IN AN ORGANIZED HEALTH CARE EDUCATION/TRAINING PROGRAM

## 2024-11-11 PROCEDURE — 2700000000 HC OXYGEN THERAPY PER DAY

## 2024-11-11 RX ORDER — SODIUM CHLORIDE 0.9 % (FLUSH) 0.9 %
5-40 SYRINGE (ML) INJECTION PRN
Status: DISCONTINUED | OUTPATIENT
Start: 2024-11-11 | End: 2024-11-12 | Stop reason: HOSPADM

## 2024-11-11 RX ORDER — DEXAMETHASONE SODIUM PHOSPHATE 10 MG/ML
INJECTION INTRAMUSCULAR; INTRAVENOUS
Status: DISCONTINUED | OUTPATIENT
Start: 2024-11-11 | End: 2024-11-11 | Stop reason: SDUPTHER

## 2024-11-11 RX ORDER — SODIUM CHLORIDE 9 MG/ML
INJECTION, SOLUTION INTRAVENOUS CONTINUOUS
Status: DISCONTINUED | OUTPATIENT
Start: 2024-11-11 | End: 2024-11-12 | Stop reason: HOSPADM

## 2024-11-11 RX ORDER — LEVOTHYROXINE SODIUM 88 UG/1
88 TABLET ORAL
Status: DISCONTINUED | OUTPATIENT
Start: 2024-11-12 | End: 2024-11-12 | Stop reason: HOSPADM

## 2024-11-11 RX ORDER — HYDROMORPHONE HYDROCHLORIDE 2 MG/ML
0.25 INJECTION, SOLUTION INTRAMUSCULAR; INTRAVENOUS; SUBCUTANEOUS EVERY 5 MIN PRN
Status: DISCONTINUED | OUTPATIENT
Start: 2024-11-11 | End: 2024-11-11 | Stop reason: HOSPADM

## 2024-11-11 RX ORDER — ONDANSETRON 4 MG/1
4 TABLET, ORALLY DISINTEGRATING ORAL EVERY 8 HOURS PRN
Status: DISCONTINUED | OUTPATIENT
Start: 2024-11-11 | End: 2024-11-12 | Stop reason: HOSPADM

## 2024-11-11 RX ORDER — SODIUM CHLORIDE 0.9 % (FLUSH) 0.9 %
5-40 SYRINGE (ML) INJECTION EVERY 12 HOURS SCHEDULED
Status: DISCONTINUED | OUTPATIENT
Start: 2024-11-11 | End: 2024-11-12 | Stop reason: HOSPADM

## 2024-11-11 RX ORDER — HYDROMORPHONE HYDROCHLORIDE 1 MG/ML
0.5 INJECTION, SOLUTION INTRAMUSCULAR; INTRAVENOUS; SUBCUTANEOUS
Status: DISCONTINUED | OUTPATIENT
Start: 2024-11-11 | End: 2024-11-12 | Stop reason: HOSPADM

## 2024-11-11 RX ORDER — ACETAMINOPHEN 650 MG/1
650 SUPPOSITORY RECTAL EVERY 6 HOURS PRN
Status: DISCONTINUED | OUTPATIENT
Start: 2024-11-11 | End: 2024-11-12 | Stop reason: HOSPADM

## 2024-11-11 RX ORDER — HYDRALAZINE HYDROCHLORIDE 20 MG/ML
10 INJECTION INTRAMUSCULAR; INTRAVENOUS
Status: DISCONTINUED | OUTPATIENT
Start: 2024-11-11 | End: 2024-11-11 | Stop reason: HOSPADM

## 2024-11-11 RX ORDER — ONDANSETRON 2 MG/ML
4 INJECTION INTRAMUSCULAR; INTRAVENOUS EVERY 6 HOURS PRN
Status: DISCONTINUED | OUTPATIENT
Start: 2024-11-11 | End: 2024-11-12 | Stop reason: HOSPADM

## 2024-11-11 RX ORDER — NALOXONE HYDROCHLORIDE 0.4 MG/ML
INJECTION, SOLUTION INTRAMUSCULAR; INTRAVENOUS; SUBCUTANEOUS PRN
Status: DISCONTINUED | OUTPATIENT
Start: 2024-11-11 | End: 2024-11-11 | Stop reason: HOSPADM

## 2024-11-11 RX ORDER — ROCURONIUM BROMIDE 10 MG/ML
INJECTION, SOLUTION INTRAVENOUS
Status: DISCONTINUED | OUTPATIENT
Start: 2024-11-11 | End: 2024-11-11 | Stop reason: SDUPTHER

## 2024-11-11 RX ORDER — OXYCODONE HYDROCHLORIDE 5 MG/1
5 TABLET ORAL EVERY 4 HOURS PRN
Status: DISCONTINUED | OUTPATIENT
Start: 2024-11-11 | End: 2024-11-12 | Stop reason: HOSPADM

## 2024-11-11 RX ORDER — HYDROCHLOROTHIAZIDE 12.5 MG/1
12.5 CAPSULE ORAL DAILY
Status: DISCONTINUED | OUTPATIENT
Start: 2024-11-12 | End: 2024-11-12 | Stop reason: HOSPADM

## 2024-11-11 RX ORDER — MIDAZOLAM HYDROCHLORIDE 2 MG/2ML
2 INJECTION, SOLUTION INTRAMUSCULAR; INTRAVENOUS
Status: DISCONTINUED | OUTPATIENT
Start: 2024-11-11 | End: 2024-11-11 | Stop reason: HOSPADM

## 2024-11-11 RX ORDER — MORPHINE SULFATE 4 MG/ML
4 INJECTION, SOLUTION INTRAMUSCULAR; INTRAVENOUS
Status: DISCONTINUED | OUTPATIENT
Start: 2024-11-11 | End: 2024-11-12 | Stop reason: HOSPADM

## 2024-11-11 RX ORDER — GLYCOPYRROLATE 0.2 MG/ML
INJECTION INTRAMUSCULAR; INTRAVENOUS
Status: DISCONTINUED | OUTPATIENT
Start: 2024-11-11 | End: 2024-11-11 | Stop reason: SDUPTHER

## 2024-11-11 RX ORDER — POTASSIUM CHLORIDE 1500 MG/1
40 TABLET, EXTENDED RELEASE ORAL PRN
Status: DISCONTINUED | OUTPATIENT
Start: 2024-11-11 | End: 2024-11-12 | Stop reason: HOSPADM

## 2024-11-11 RX ORDER — LIDOCAINE HYDROCHLORIDE 20 MG/ML
INJECTION, SOLUTION EPIDURAL; INFILTRATION; INTRACAUDAL; PERINEURAL
Status: DISCONTINUED | OUTPATIENT
Start: 2024-11-11 | End: 2024-11-11 | Stop reason: SDUPTHER

## 2024-11-11 RX ORDER — BUPIVACAINE HYDROCHLORIDE 2.5 MG/ML
INJECTION, SOLUTION EPIDURAL; INFILTRATION; INTRACAUDAL PRN
Status: DISCONTINUED | OUTPATIENT
Start: 2024-11-11 | End: 2024-11-11 | Stop reason: ALTCHOICE

## 2024-11-11 RX ORDER — FUROSEMIDE 20 MG/1
20 TABLET ORAL DAILY
Status: DISCONTINUED | OUTPATIENT
Start: 2024-11-12 | End: 2024-11-12 | Stop reason: HOSPADM

## 2024-11-11 RX ORDER — SODIUM CHLORIDE 0.9 % (FLUSH) 0.9 %
5-40 SYRINGE (ML) INJECTION PRN
Status: DISCONTINUED | OUTPATIENT
Start: 2024-11-11 | End: 2024-11-11 | Stop reason: HOSPADM

## 2024-11-11 RX ORDER — ONDANSETRON 2 MG/ML
4 INJECTION INTRAMUSCULAR; INTRAVENOUS
Status: DISCONTINUED | OUTPATIENT
Start: 2024-11-11 | End: 2024-11-11 | Stop reason: HOSPADM

## 2024-11-11 RX ORDER — MAGNESIUM SULFATE IN WATER 40 MG/ML
2000 INJECTION, SOLUTION INTRAVENOUS PRN
Status: DISCONTINUED | OUTPATIENT
Start: 2024-11-11 | End: 2024-11-12 | Stop reason: HOSPADM

## 2024-11-11 RX ORDER — SODIUM CHLORIDE, SODIUM LACTATE, POTASSIUM CHLORIDE, CALCIUM CHLORIDE 600; 310; 30; 20 MG/100ML; MG/100ML; MG/100ML; MG/100ML
INJECTION, SOLUTION INTRAVENOUS CONTINUOUS
Status: DISCONTINUED | OUTPATIENT
Start: 2024-11-11 | End: 2024-11-11 | Stop reason: HOSPADM

## 2024-11-11 RX ORDER — OXYCODONE HYDROCHLORIDE 5 MG/1
5 TABLET ORAL
Status: DISCONTINUED | OUTPATIENT
Start: 2024-11-11 | End: 2024-11-11 | Stop reason: HOSPADM

## 2024-11-11 RX ORDER — FENTANYL CITRATE 50 UG/ML
100 INJECTION, SOLUTION INTRAMUSCULAR; INTRAVENOUS
Status: DISCONTINUED | OUTPATIENT
Start: 2024-11-11 | End: 2024-11-11 | Stop reason: HOSPADM

## 2024-11-11 RX ORDER — SODIUM CHLORIDE 9 MG/ML
INJECTION, SOLUTION INTRAVENOUS PRN
Status: DISCONTINUED | OUTPATIENT
Start: 2024-11-11 | End: 2024-11-11 | Stop reason: HOSPADM

## 2024-11-11 RX ORDER — LISINOPRIL AND HYDROCHLOROTHIAZIDE 12.5; 2 MG/1; MG/1
1 TABLET ORAL DAILY
Status: DISCONTINUED | OUTPATIENT
Start: 2024-11-12 | End: 2024-11-11 | Stop reason: SDUPTHER

## 2024-11-11 RX ORDER — PROCHLORPERAZINE EDISYLATE 5 MG/ML
5 INJECTION INTRAMUSCULAR; INTRAVENOUS
Status: DISCONTINUED | OUTPATIENT
Start: 2024-11-11 | End: 2024-11-11 | Stop reason: HOSPADM

## 2024-11-11 RX ORDER — ONDANSETRON 2 MG/ML
4 INJECTION INTRAMUSCULAR; INTRAVENOUS
Status: COMPLETED | OUTPATIENT
Start: 2024-11-11 | End: 2024-11-11

## 2024-11-11 RX ORDER — OXYCODONE HYDROCHLORIDE 5 MG/1
10 TABLET ORAL EVERY 4 HOURS PRN
Status: DISCONTINUED | OUTPATIENT
Start: 2024-11-11 | End: 2024-11-12 | Stop reason: HOSPADM

## 2024-11-11 RX ORDER — ACETAMINOPHEN 500 MG
1000 TABLET ORAL ONCE
Status: COMPLETED | OUTPATIENT
Start: 2024-11-11 | End: 2024-11-11

## 2024-11-11 RX ORDER — ATORVASTATIN CALCIUM 20 MG/1
20 TABLET, FILM COATED ORAL NIGHTLY
Status: DISCONTINUED | OUTPATIENT
Start: 2024-11-12 | End: 2024-11-12 | Stop reason: HOSPADM

## 2024-11-11 RX ORDER — LISINOPRIL 20 MG/1
20 TABLET ORAL DAILY
Status: DISCONTINUED | OUTPATIENT
Start: 2024-11-12 | End: 2024-11-12 | Stop reason: HOSPADM

## 2024-11-11 RX ORDER — NEOSTIGMINE METHYLSULFATE 1 MG/ML
INJECTION INTRAVENOUS
Status: DISCONTINUED | OUTPATIENT
Start: 2024-11-11 | End: 2024-11-11 | Stop reason: SDUPTHER

## 2024-11-11 RX ORDER — ACETAMINOPHEN 325 MG/1
650 TABLET ORAL EVERY 6 HOURS PRN
Status: DISCONTINUED | OUTPATIENT
Start: 2024-11-11 | End: 2024-11-12 | Stop reason: HOSPADM

## 2024-11-11 RX ORDER — FLECAINIDE ACETATE 50 MG/1
50 TABLET ORAL 2 TIMES DAILY
Status: DISCONTINUED | OUTPATIENT
Start: 2024-11-12 | End: 2024-11-12 | Stop reason: HOSPADM

## 2024-11-11 RX ORDER — METRONIDAZOLE 500 MG/100ML
500 INJECTION, SOLUTION INTRAVENOUS EVERY 8 HOURS
Status: DISCONTINUED | OUTPATIENT
Start: 2024-11-11 | End: 2024-11-12

## 2024-11-11 RX ORDER — POTASSIUM CHLORIDE 7.45 MG/ML
10 INJECTION INTRAVENOUS PRN
Status: DISCONTINUED | OUTPATIENT
Start: 2024-11-11 | End: 2024-11-12 | Stop reason: HOSPADM

## 2024-11-11 RX ORDER — LIDOCAINE HYDROCHLORIDE 10 MG/ML
1 INJECTION, SOLUTION INFILTRATION; PERINEURAL
Status: DISCONTINUED | OUTPATIENT
Start: 2024-11-11 | End: 2024-11-11 | Stop reason: HOSPADM

## 2024-11-11 RX ORDER — IOPAMIDOL 755 MG/ML
100 INJECTION, SOLUTION INTRAVASCULAR
Status: COMPLETED | OUTPATIENT
Start: 2024-11-11 | End: 2024-11-11

## 2024-11-11 RX ORDER — MORPHINE SULFATE 4 MG/ML
4 INJECTION, SOLUTION INTRAMUSCULAR; INTRAVENOUS
Status: COMPLETED | OUTPATIENT
Start: 2024-11-11 | End: 2024-11-11

## 2024-11-11 RX ORDER — PROPOFOL 10 MG/ML
INJECTION, EMULSION INTRAVENOUS
Status: DISCONTINUED | OUTPATIENT
Start: 2024-11-11 | End: 2024-11-11 | Stop reason: SDUPTHER

## 2024-11-11 RX ORDER — MORPHINE SULFATE 2 MG/ML
2 INJECTION, SOLUTION INTRAMUSCULAR; INTRAVENOUS
Status: DISCONTINUED | OUTPATIENT
Start: 2024-11-11 | End: 2024-11-12 | Stop reason: HOSPADM

## 2024-11-11 RX ORDER — SODIUM CHLORIDE 0.9 % (FLUSH) 0.9 %
5-40 SYRINGE (ML) INJECTION EVERY 12 HOURS SCHEDULED
Status: DISCONTINUED | OUTPATIENT
Start: 2024-11-11 | End: 2024-11-11 | Stop reason: HOSPADM

## 2024-11-11 RX ORDER — LABETALOL HYDROCHLORIDE 5 MG/ML
10 INJECTION, SOLUTION INTRAVENOUS
Status: DISCONTINUED | OUTPATIENT
Start: 2024-11-11 | End: 2024-11-11 | Stop reason: HOSPADM

## 2024-11-11 RX ORDER — KETOROLAC TROMETHAMINE 30 MG/ML
INJECTION, SOLUTION INTRAMUSCULAR; INTRAVENOUS
Status: DISCONTINUED | OUTPATIENT
Start: 2024-11-11 | End: 2024-11-11 | Stop reason: SDUPTHER

## 2024-11-11 RX ORDER — ONDANSETRON 2 MG/ML
INJECTION INTRAMUSCULAR; INTRAVENOUS
Status: DISCONTINUED | OUTPATIENT
Start: 2024-11-11 | End: 2024-11-11 | Stop reason: SDUPTHER

## 2024-11-11 RX ORDER — MONTELUKAST SODIUM 10 MG/1
10 TABLET ORAL NIGHTLY
Status: DISCONTINUED | OUTPATIENT
Start: 2024-11-11 | End: 2024-11-12 | Stop reason: HOSPADM

## 2024-11-11 RX ORDER — HYDROMORPHONE HYDROCHLORIDE 2 MG/ML
0.5 INJECTION, SOLUTION INTRAMUSCULAR; INTRAVENOUS; SUBCUTANEOUS EVERY 5 MIN PRN
Status: DISCONTINUED | OUTPATIENT
Start: 2024-11-11 | End: 2024-11-11 | Stop reason: HOSPADM

## 2024-11-11 RX ORDER — METOPROLOL SUCCINATE 50 MG/1
50 TABLET, EXTENDED RELEASE ORAL DAILY
Status: DISCONTINUED | OUTPATIENT
Start: 2024-11-12 | End: 2024-11-12 | Stop reason: HOSPADM

## 2024-11-11 RX ORDER — SODIUM CHLORIDE 9 MG/ML
INJECTION, SOLUTION INTRAVENOUS PRN
Status: DISCONTINUED | OUTPATIENT
Start: 2024-11-11 | End: 2024-11-12 | Stop reason: HOSPADM

## 2024-11-11 RX ADMIN — ONDANSETRON 4 MG: 2 INJECTION INTRAMUSCULAR; INTRAVENOUS at 03:15

## 2024-11-11 RX ADMIN — NEOSTIGMINE METHYLSULFATE 5 MG: 1.02 INJECTION INTRAVENOUS at 14:05

## 2024-11-11 RX ADMIN — SODIUM CHLORIDE, PRESERVATIVE FREE 10 ML: 5 INJECTION INTRAVENOUS at 19:31

## 2024-11-11 RX ADMIN — ROCURONIUM BROMIDE 30 MG: 10 INJECTION, SOLUTION INTRAVENOUS at 13:15

## 2024-11-11 RX ADMIN — KETOROLAC TROMETHAMINE 30 MG: 30 INJECTION, SOLUTION INTRAMUSCULAR at 13:25

## 2024-11-11 RX ADMIN — MORPHINE SULFATE 4 MG: 4 INJECTION INTRAVENOUS at 00:40

## 2024-11-11 RX ADMIN — FAMOTIDINE 20 MG: 10 INJECTION, SOLUTION INTRAVENOUS at 09:01

## 2024-11-11 RX ADMIN — PROPOFOL 130 MG: 10 INJECTION, EMULSION INTRAVENOUS at 13:15

## 2024-11-11 RX ADMIN — LIDOCAINE HYDROCHLORIDE 60 MG: 20 INJECTION, SOLUTION EPIDURAL; INFILTRATION; INTRACAUDAL; PERINEURAL at 13:15

## 2024-11-11 RX ADMIN — DEXAMETHASONE SODIUM PHOSPHATE 4 MG: 10 INJECTION INTRAMUSCULAR; INTRAVENOUS at 13:25

## 2024-11-11 RX ADMIN — PIPERACILLIN AND TAZOBACTAM 3375 MG: 3; .375 INJECTION, POWDER, LYOPHILIZED, FOR SOLUTION INTRAVENOUS at 03:18

## 2024-11-11 RX ADMIN — GLYCOPYRROLATE 0.8 MG: 0.2 INJECTION INTRAMUSCULAR; INTRAVENOUS at 14:05

## 2024-11-11 RX ADMIN — PIPERACILLIN AND TAZOBACTAM 3375 MG: 3; .375 INJECTION, POWDER, LYOPHILIZED, FOR SOLUTION INTRAVENOUS at 09:01

## 2024-11-11 RX ADMIN — SODIUM CHLORIDE: 9 INJECTION, SOLUTION INTRAVENOUS at 17:20

## 2024-11-11 RX ADMIN — ACETAMINOPHEN 1000 MG: 500 TABLET, FILM COATED ORAL at 11:31

## 2024-11-11 RX ADMIN — ONDANSETRON 4 MG: 2 INJECTION INTRAMUSCULAR; INTRAVENOUS at 13:25

## 2024-11-11 RX ADMIN — PROPOFOL 50 MG: 10 INJECTION, EMULSION INTRAVENOUS at 13:50

## 2024-11-11 RX ADMIN — MONTELUKAST 10 MG: 10 TABLET, FILM COATED ORAL at 19:30

## 2024-11-11 RX ADMIN — PROPOFOL 20 MG: 10 INJECTION, EMULSION INTRAVENOUS at 13:45

## 2024-11-11 RX ADMIN — SODIUM CHLORIDE: 9 INJECTION, SOLUTION INTRAVENOUS at 05:14

## 2024-11-11 RX ADMIN — ROCURONIUM BROMIDE 10 MG: 10 INJECTION, SOLUTION INTRAVENOUS at 13:49

## 2024-11-11 RX ADMIN — FAMOTIDINE 20 MG: 10 INJECTION, SOLUTION INTRAVENOUS at 19:30

## 2024-11-11 RX ADMIN — CEFTRIAXONE SODIUM 2000 MG: 2 INJECTION, POWDER, FOR SOLUTION INTRAMUSCULAR; INTRAVENOUS at 17:23

## 2024-11-11 RX ADMIN — IOPAMIDOL 100 ML: 755 INJECTION, SOLUTION INTRAVENOUS at 00:12

## 2024-11-11 RX ADMIN — METRONIDAZOLE 500 MG: 500 INJECTION, SOLUTION INTRAVENOUS at 17:25

## 2024-11-11 ASSESSMENT — PAIN SCALES - GENERAL
PAINLEVEL_OUTOF10: 4
PAINLEVEL_OUTOF10: 10
PAINLEVEL_OUTOF10: 8
PAINLEVEL_OUTOF10: 0

## 2024-11-11 ASSESSMENT — PAIN - FUNCTIONAL ASSESSMENT
PAIN_FUNCTIONAL_ASSESSMENT: NONE - DENIES PAIN
PAIN_FUNCTIONAL_ASSESSMENT: 0-10
PAIN_FUNCTIONAL_ASSESSMENT: 0-10

## 2024-11-11 ASSESSMENT — PAIN DESCRIPTION - LOCATION: LOCATION: ABDOMEN

## 2024-11-11 NOTE — PERIOP NOTE
TRANSFER - IN REPORT:    Verbal report received from CARLOS ALBERTO Hopper on Lisbeth Reyes being received from 214 for ordered procedure      Report consisted of patient’s Situation, Background, Assessment and Recommendations(SBAR).     Information from the following report(s) SBAR, Kardex, ED Summary, Intake/Output, MAR, Recent Results, Med Rec Status, Procedure Verification, and Quality Measures was reviewed with the receiving nurse.    Opportunity for questions and clarification was provided.      Assessment completed upon patient’s arrival to unit and care assumed.

## 2024-11-11 NOTE — OP NOTE
Cholecystectomy Op Note Template Note     Indications: The patient was admitted to the hospital with severe RUQ pain.   The patient now presents for a laparoscopic, possible open, cholecystectomy after discussing therapeutic alternatives.     Pre-Op Diagnosis: Acute cholecystitis [K81.0]    Post-Op Diagnosis: Acute cholecystitis and cholelithiasis found    Procedure: LAPAROSCOPIC CHOLECYSTECTOMY      Surgeon: KRISTINE CHAVEZ MD    Anesthesia:  General plus local         Procedure Details     The patient was brought to the operating room and placed supine. After induction of a general anesthetic, the abdomen was prepped and draped in standard fashion.  An incision was made in the umbilicus and a Edenilson trocar was placed in the usual fashion after which  the abdomen was insufflated to 15mmHg sterile CO2.  The other trocars were then placed under direct vision.  These were positioned four fingerbreadths below the right costal margin in the anterior axillary line and mid clavicular line and just to the right of the falciform ligament in the epigatrium.  The gallbladder was distended and thick-walled. I had to decompress the gallbladder of 90 cc's of bilious material before I could grasp it. The gallbladder was grasped.  Omental adhesions were taken down. The area of Calot's triangle was dissected with the cystic duct and cystic artery being exposed. Once these two structures had been identified the gallbladder graspers were repositioned, so that one was on the liver/gallbladder junction and a second on the fundus of the gallbladder. The gallbladder was  from the liver with the use of the Harmonic scalpel. The dissection was taken down to the cystic artery which was divided between four 5 mm clips. The cystic duct was divided between four 5 mm clips. The gallbladder was free of all attachments and was removed from the abdomen via the umbilical incision site. The gallbladder was sent to pathology for evaluation

## 2024-11-11 NOTE — ANESTHESIA PRE PROCEDURE
Department of Anesthesiology  Preprocedure Note       Name:  Lisbeth Reyes   Age:  74 y.o.  :  1950                                          MRN:  [id number]         Date:  2024      Surgeon: Surgeon(s):  Ant Mathur MD    Procedure: Procedure(s):  CHOLECYSTECTOMY LAPAROSCOPIC    Medications prior to admission:   Prior to Admission medications    Medication Sig Start Date End Date Taking? Authorizing Provider   tiZANidine (ZANAFLEX) 2 MG tablet Take 1 tablet by mouth 3 times daily as needed (muscle spasm) 24  Yes Oh Ceballos PA   cyanocobalamin 1000 MCG/ML injection 1 ml IM q month Dx: pernicious anemia Indications: anemia due to vitamin B12 deficiency-pernicious anemia 10/15/24  Yes Emani Alvarez MD   FLUoxetine (PROZAC) 20 MG capsule TAKE 1 TO 2 CAPSULES BY MOUTH EVERY MORNING FOR MOOD 10/15/24  Yes Emani Alvarez MD   furosemide (LASIX) 20 MG tablet TAKE 1 TO 2 TABLETS BY MOUTH EVERY MORNING 10/15/24  Yes Emani Alvarez MD   levothyroxine (SYNTHROID) 88 MCG tablet Take 1 tablet by mouth every morning (before breakfast) 10/15/24  Yes Emani Alvarez MD   lisinopril-hydroCHLOROthiazide (PRINZIDE;ZESTORETIC) 20-12.5 MG per tablet Take 1 tablet by mouth daily 10/15/24  Yes Emani Alvarez MD   montelukast (SINGULAIR) 10 MG tablet TAKE 1 TABLET BY MOUTH DAILY FOR ALLERGIES 10/15/24  Yes Emani Alvarez MD   omeprazole (PRILOSEC) 20 MG delayed release capsule TAKE 1 CAPSULE BY MOUTH EVERY MORNING FOR REFLUX 10/15/24  Yes Emani Alvarez MD   Semaglutide, 1 MG/DOSE, (OZEMPIC, 1 MG/DOSE,) 4 MG/3ML SOPN sc injection INJECT 1 MG UNDER THE SKIN ONCE WEEKLY, ROTATE INJECTION SITES 10/15/24  Yes Emani Alvarez MD   vitamin D (ERGOCALCIFEROL) 1.25 MG (45757 UT) CAPS capsule TAKE ONE CAPSULE BY MOUTH EVERY SEVEN DAYS 10/15/24  Yes Emani Alvarez MD   metFORMIN (GLUCOPHAGE-XR) 500 MG extended release tablet TAKE 2 TABLETS BY MOUTH EVERY DAY AT HEAVIEST MEAL 24  Yes Provider, Historical,

## 2024-11-11 NOTE — ED NOTES
Assumed care of pt at this time. Pt resting quietly in bed, attached to bedside monitor. Warm blankets provided. Pt denies questions and concerns at this time.        Ale Thompson RN  11/10/24 4825

## 2024-11-11 NOTE — PERIOP NOTE
TRANSFER - OUT REPORT:    Verbal report given to Ruchi CARCAMO  on Lisbeth Reyes  being transferred to [facility] for routine progression of patient care       Report consisted of patient’s Situation, Background, Assessment and   Recommendations(SBAR).     Information from the following report(s) Nurse Handoff Report, Adult Overview, Surgery Report, Intake/Output, and MAR was reviewed with the receiving nurse.    Lines:   Peripheral IV Left Antecubital (Active)   Site Assessment Clean, dry & intact 11/11/24 1525   Line Status Flushed 11/11/24 1525   Line Care Connections checked and tightened 11/11/24 1525   Phlebitis Assessment No symptoms 11/11/24 1525   Infiltration Assessment 0 11/11/24 1525   Alcohol Cap Used No 11/11/24 1525   Dressing Status Clean, dry & intact 11/11/24 1525   Dressing Type Transparent 11/11/24 1525        Opportunity for questions and clarification was provided.      Patient transported with:   O2 @ 4 liters    VTE prophylaxis orders have been written for Lisbeth Reyes.    Patient and family given floor number and nurses name.  Family updated re: pt status after security code verified.

## 2024-11-11 NOTE — ACP (ADVANCE CARE PLANNING)
Advance Care Planning   Healthcare Decision Maker:    Primary Decision Maker: CRIS REYES - [facility] - [phone]    Secondary Decision Maker: Cris Reyes  - Child - [phone]    Click here to complete Healthcare Decision Makers including selection of the Healthcare Decision Maker Relationship (ie \"Primary\").

## 2024-11-11 NOTE — PLAN OF CARE
Problem: Chronic Conditions and Co-morbidities  Goal: Patient's chronic conditions and co-morbidity symptoms are monitored and maintained or improved  11/11/2024 0625 by Manuela Heaton RN  Outcome: Progressing  11/11/2024 0530 by Valorie Hagen RN  Outcome: Progressing     Problem: Discharge Planning  Goal: Discharge to home or other facility with appropriate resources  11/11/2024 0625 by Manuela Heaton RN  Outcome: Progressing  11/11/2024 0530 by Valorie Hagen RN  Outcome: Progressing     Problem: Pain  Goal: Verbalizes/displays adequate comfort level or baseline comfort level  11/11/2024 0625 by Manuela Heaton RN  Outcome: Progressing  11/11/2024 0530 by Valorie Hagen RN  Outcome: Progressing     Problem: Safety - Adult  Goal: Free from fall injury  11/11/2024 0625 by Manuela Heaton RN  Outcome: Progressing  11/11/2024 0530 by Valorie Hagen RN  Outcome: Progressing     Problem: Skin/Tissue Integrity  Goal: Absence of new skin breakdown  Description: 1.  Monitor for areas of redness and/or skin breakdown  2.  Assess vascular access sites hourly  3.  Every 4-6 hours minimum:  Change oxygen saturation probe site  4.  Every 4-6 hours:  If on nasal continuous positive airway pressure, respiratory therapy assess nares and determine need for appliance change or resting period.  Outcome: Progressing

## 2024-11-11 NOTE — ED PROVIDER NOTES
[facility]  Emergency Department    DISPOSITION Decision To Admit 11/11/2024 03:16:21 AM     ICD-10-CM    1. Cholecystitis  K81.9         New Prescriptions    No medications on file     ED Course     ED Course as of 11/11/24 0349   Sun Nov 10, 2024   2300 74-year-old female with history of GERD, HTN presents with 5 hours of upper abdominal and bilateral flank pain.  Mild hypertension, otherwise vitals reassuring; afebrile.  Has tenderness throughout upper abdomen and flanks although mild.  Differentials broad.  Will obtain labs and CT imaging and give pain control/antiemetic [ER]   Mon Nov 11, 2024   0128 Labs with leukocytosis otherwise reassuring; negative lactic.  CT imaging shows dilated gallbladder with pericholecystic fluid but no obvious wall thickening or gallstones.  No other acute abnormality identified.  Will add on dedicated gallbladder ultrasound to further rule out cholecystitis.  She feels improved on reassessment but still with some mild epigastric tenderness [ER]   0348 Ultrasound shows wall thickening, distended gallbladder, positive sonographic Mark's.  I am concerned for acute cholecystitis.  Will add antibiotics.  Discussed with surgery who will admit [ER]      ED Course User Index  [ER] Christo Stallings MD     Data Reviewed and Analyzed:  1 or more acute illnesses that pose a threat to life or bodily function.     I independently ordered and reviewed each unique test.   I interpreted the CT Scan distended gallbladder.  I interpreted the Ultrasound  wall thickening, distended gallbladder.  I interpreted the labs.  The patient was admitted and I have discussed patient management with the admitting provider.  The management of this patient was discussed with an external consultant.         AMANDA   Lisbeth Reyes is a 74 y.o. female with a history of GERD, chronic back pain, HTN who presents to the ED with complaint of abdominal pain and flank pain.  Reports a

## 2024-11-11 NOTE — H&P
H&P Note:   Lisbeth Reyes  MRN: [id number]  :1950  Age:74 y.o.    HPI: Lisbeth Reyes is a 74 y.o. female who general surgery is admitting for acute cholecystitis.  The patient has a PMHx of paroxysmal A-fib (on Eliquis), sleep apnea, hypothyroidism, HLD, HTN, anxiety, basal cell carcinoma of cheek, acid reflux, OA (hips and knees), elevated blood uric acid level, and chronic back pain.   She presented 11/10/2024 with acute onset (during the day 11/10/2024) of severe upper abdominal pain with radiation to bilateral flanks with associated nausea and bilious vomiting.  Upper abdominal pain with radiation to bilateral flanks is described as constant, aching, sore, 10/10 at worst, not aggravated or alleviated by anything.  She experienced upper abdominal pain similar ,although not as severe, last week 11/3/24.  Upper abdominal pain 1 week ago(11/3/24) resolved over the course of a few days.    Patient endorses eating a hot dog 11/10/2024 prior to presenting symptoms onset.  Patient was doing well and in her normal state of health outside of her 2 episodes of upper abdominal pain.  Positive flatus and normal bowel movements with last bowel movement 11/10/2024.  Pt endorses daily bowel movements at base line but she occasionally has bm every other day that starts as Hard/formed and ends loose. She denied melena, hematochezia, hematemesis, coffee grounds emesis, fevers, or chills.   In the ED 11/10/24, Tmax 99.1.  BP 130s-207/60s-90; otherwise VSS.  Upper abdominal pain 10/10 at worst.    Liver profile = WNL.  WBC 17.0, glucose 133; CBC and CMP otherwise unremarkable.  CT ABDOMEN AND PELVIS WITH INTRAVENOUS CONTRAST 24 00:17  IMPRESSION:  1.  Appendix is normal.  2.  Distended 8 x 4 cm gallbladder with minimal pericholecystic fluid,  but NO CT evidence for calcified gallstones.  Correlate with  gallbladder ultrasound.  3.  NO RIGHT or left hydronephrosis.  Couple simple bilateral

## 2024-11-11 NOTE — CARE COORDINATION
RNCM met with patient in room 214 to discuss discharge planning.    Patient lives with spouse in a one level home with 4 steps for entry. Patient completes ADLs independently at baseline and ambulates without assistive devices. Patient uses a CPAP at night. Patient is an active . Demographics and PCP verified. No discharge needs identified by RNCM. Please consult case management if any discharge needs arise.       11/11/24 1015   Service Assessment   Patient Orientation Alert and Oriented   Cognition Alert   History Provided By Patient   Primary Caregiver Self   Accompanied By/Relationship n/a   Support Systems Spouse/Significant Other;Children   Patient's Healthcare Decision Maker is: Named in Scanned ACP Document   PCP Verified by CM Yes   Last Visit to PCP Within last 3 months   Prior Functional Level Independent in ADLs/IADLs   Current Functional Level Independent in ADLs/IADLs   Can patient return to prior living arrangement Yes   Ability to make needs known: Good   Family able to assist with home care needs: Yes   Would you like for me to discuss the discharge plan with any other family members/significant others, and if so, who? No   Financial Resources Medicare   Community Resources None   Social/Functional History   Lives With Spouse   Type of Home House   Home Layout One level   Home Access Stairs to enter with rails   Entrance Stairs - Number of Steps 4   Home Equipment None   Receives Help From Family   ADL Assistance Independent   Ambulation Assistance Independent   Transfer Assistance Independent   Active  Yes   Occupation Retired   Discharge Planning   Type of Residence House   Living Arrangements Spouse/Significant Other   Current Services Prior To Admission C-pap   Potential Assistance Needed N/A   Potential Assistance Purchasing Medications No   Patient expects to be discharged to: House   One/Two Story Residence One story   History of falls? 1

## 2024-11-11 NOTE — ED NOTES
TRANSFER - OUT REPORT:    Verbal report given to Jordi on Lisbeth Reyes  being transferred to 2nf floor room 214 for routine progression of patient care       Report consisted of patient's Situation, Background, Assessment and   Recommendations(SBAR).     Information from the following report(s) ED SBAR was reviewed with the receiving nurse.    [place] Fall Assessment:    Presents to emergency department  because of falls (Syncope, seizure, or loss of consciousness): No  Age > 70: Yes  Altered Mental Status, Intoxication with alcohol or substance confusion (Disorientation, impaired judgment, poor safety awaremess, or inability to follow instructions): No  Impaired Mobility: Ambulates or transfers with assistive devices or assistance; Unable to ambulate or transer.: No  Nursing Judgement: No          Lines:   Peripheral IV Left Antecubital (Active)   Site Assessment Clean, dry & intact 11/11/24 0052   Line Status Flushed 11/11/24 0052   Line Care Connections checked and tightened 11/11/24 0052   Phlebitis Assessment No symptoms 11/11/24 0052   Infiltration Assessment 0 11/11/24 0052   Alcohol Cap Used No 11/11/24 0052   Dressing Status Clean, dry & intact 11/11/24 0052   Dressing Type Transparent 11/11/24 0052        Opportunity for questions and clarification was provided.      Patient transported with:  Registered Nurse          Diana Julio RN  11/11/24 7016

## 2024-11-11 NOTE — ED TRIAGE NOTES
C/o Abd pain and bilateral flank pain, started about 1830 today, denies any urinary s/s. N/V, chills, body aches

## 2024-11-11 NOTE — ED NOTES
Report given to Guerline ENGEL RN to assume care at this time.      Ale Thompson, CARLOS ALBERTO  11/11/24 0113

## 2024-11-12 VITALS
WEIGHT: 220 LBS | OXYGEN SATURATION: 100 % | RESPIRATION RATE: 19 BRPM | TEMPERATURE: 98.4 F | BODY MASS INDEX: 38.98 KG/M2 | DIASTOLIC BLOOD PRESSURE: 55 MMHG | HEIGHT: 63 IN | HEART RATE: 60 BPM | SYSTOLIC BLOOD PRESSURE: 108 MMHG

## 2024-11-12 PROBLEM — K81.0 ACUTE CHOLECYSTITIS: Status: RESOLVED | Noted: 2024-11-11 | Resolved: 2024-11-12

## 2024-11-12 PROBLEM — N39.0 UTI (URINARY TRACT INFECTION): Status: ACTIVE | Noted: 2024-11-12

## 2024-11-12 LAB
ALBUMIN SERPL-MCNC: 3.2 G/DL (ref 3.2–4.6)
ALBUMIN/GLOB SERPL: 1.1 (ref 1–1.9)
ALP SERPL-CCNC: 96 U/L (ref 35–104)
ALT SERPL-CCNC: 130 U/L (ref 8–45)
ANION GAP SERPL CALC-SCNC: 10 MMOL/L (ref 7–16)
AST SERPL-CCNC: 83 U/L (ref 15–37)
BASOPHILS # BLD: 0 K/UL (ref 0–0.2)
BASOPHILS NFR BLD: 0 % (ref 0–2)
BILIRUB SERPL-MCNC: 0.7 MG/DL (ref 0–1.2)
BUN SERPL-MCNC: 15 MG/DL (ref 8–23)
CALCIUM SERPL-MCNC: 10.7 MG/DL (ref 8.8–10.2)
CHLORIDE SERPL-SCNC: 103 MMOL/L (ref 98–107)
CO2 SERPL-SCNC: 27 MMOL/L (ref 20–29)
CREAT SERPL-MCNC: 0.93 MG/DL (ref 0.6–1.1)
DIFFERENTIAL METHOD BLD: ABNORMAL
EOSINOPHIL # BLD: 0 K/UL (ref 0–0.8)
EOSINOPHIL NFR BLD: 0 % (ref 0.5–7.8)
ERYTHROCYTE [DISTWIDTH] IN BLOOD BY AUTOMATED COUNT: 13.1 % (ref 11.9–14.6)
GLOBULIN SER CALC-MCNC: 3 G/DL (ref 2.3–3.5)
GLUCOSE SERPL-MCNC: 123 MG/DL (ref 70–99)
HCT VFR BLD AUTO: 36.1 % (ref 35.8–46.3)
HGB BLD-MCNC: 12 G/DL (ref 11.7–15.4)
IMM GRANULOCYTES # BLD AUTO: 0.1 K/UL (ref 0–0.5)
IMM GRANULOCYTES NFR BLD AUTO: 1 % (ref 0–5)
LYMPHOCYTES # BLD: 0.6 K/UL (ref 0.5–4.6)
LYMPHOCYTES NFR BLD: 5 % (ref 13–44)
MCH RBC QN AUTO: 30.8 PG (ref 26.1–32.9)
MCHC RBC AUTO-ENTMCNC: 33.2 G/DL (ref 31.4–35)
MCV RBC AUTO: 92.8 FL (ref 82–102)
MONOCYTES # BLD: 1 K/UL (ref 0.1–1.3)
MONOCYTES NFR BLD: 8 % (ref 4–12)
NEUTS SEG # BLD: 10.8 K/UL (ref 1.7–8.2)
NEUTS SEG NFR BLD: 86 % (ref 43–78)
NRBC # BLD: 0 K/UL (ref 0–0.2)
PLATELET # BLD AUTO: 153 K/UL (ref 150–450)
PMV BLD AUTO: 11.4 FL (ref 9.4–12.3)
POTASSIUM SERPL-SCNC: 3.9 MMOL/L (ref 3.5–5.1)
PROT SERPL-MCNC: 6.2 G/DL (ref 6.3–8.2)
RBC # BLD AUTO: 3.89 M/UL (ref 4.05–5.2)
SODIUM SERPL-SCNC: 140 MMOL/L (ref 136–145)
WBC # BLD AUTO: 12.5 K/UL (ref 4.3–11.1)

## 2024-11-12 PROCEDURE — 80053 COMPREHEN METABOLIC PANEL: CPT

## 2024-11-12 PROCEDURE — 6370000000 HC RX 637 (ALT 250 FOR IP)

## 2024-11-12 PROCEDURE — 6370000000 HC RX 637 (ALT 250 FOR IP): Performed by: SURGERY

## 2024-11-12 PROCEDURE — 36415 COLL VENOUS BLD VENIPUNCTURE: CPT

## 2024-11-12 PROCEDURE — 85025 COMPLETE CBC W/AUTO DIFF WBC: CPT

## 2024-11-12 RX ORDER — METRONIDAZOLE 500 MG/1
500 TABLET ORAL EVERY 8 HOURS SCHEDULED
Status: DISCONTINUED | OUTPATIENT
Start: 2024-11-12 | End: 2024-11-12 | Stop reason: HOSPADM

## 2024-11-12 RX ORDER — SULFAMETHOXAZOLE AND TRIMETHOPRIM 800; 160 MG/1; MG/1
1 TABLET ORAL 2 TIMES DAILY
Qty: 6 TABLET | Refills: 0 | Status: SHIPPED | OUTPATIENT
Start: 2024-11-12 | End: 2024-11-12 | Stop reason: HOSPADM

## 2024-11-12 RX ORDER — FAMOTIDINE 20 MG/1
20 TABLET, FILM COATED ORAL 2 TIMES DAILY
Status: DISCONTINUED | OUTPATIENT
Start: 2024-11-12 | End: 2024-11-12 | Stop reason: HOSPADM

## 2024-11-12 RX ORDER — OXYCODONE AND ACETAMINOPHEN 5; 325 MG/1; MG/1
1 TABLET ORAL EVERY 6 HOURS PRN
Qty: 12 TABLET | Refills: 0 | Status: SHIPPED | OUTPATIENT
Start: 2024-11-12 | End: 2024-11-15

## 2024-11-12 RX ADMIN — FUROSEMIDE 20 MG: 20 TABLET ORAL at 08:56

## 2024-11-12 RX ADMIN — FLECAINIDE ACETATE 50 MG: 50 TABLET ORAL at 08:57

## 2024-11-12 RX ADMIN — HYDROCHLOROTHIAZIDE 12.5 MG: 12.5 CAPSULE ORAL at 08:56

## 2024-11-12 RX ADMIN — LISINOPRIL 20 MG: 20 TABLET ORAL at 08:56

## 2024-11-12 RX ADMIN — FAMOTIDINE 20 MG: 20 TABLET, FILM COATED ORAL at 10:49

## 2024-11-12 RX ADMIN — FLUOXETINE HYDROCHLORIDE 40 MG: 20 CAPSULE ORAL at 08:57

## 2024-11-12 RX ADMIN — LEVOTHYROXINE SODIUM 88 MCG: 0.09 TABLET ORAL at 05:16

## 2024-11-12 RX ADMIN — OXYCODONE 5 MG: 5 TABLET ORAL at 10:53

## 2024-11-12 RX ADMIN — METOPROLOL SUCCINATE 50 MG: 50 TABLET, EXTENDED RELEASE ORAL at 08:57

## 2024-11-12 ASSESSMENT — PAIN DESCRIPTION - DESCRIPTORS: DESCRIPTORS: SORE

## 2024-11-12 ASSESSMENT — PAIN DESCRIPTION - LOCATION: LOCATION: ABDOMEN;INCISION

## 2024-11-12 ASSESSMENT — PAIN DESCRIPTION - ORIENTATION: ORIENTATION: ANTERIOR

## 2024-11-12 ASSESSMENT — PAIN SCALES - GENERAL: PAINLEVEL_OUTOF10: 6

## 2024-11-12 NOTE — PROGRESS NOTES
4 Eyes Skin Assessment     NAME:  Lisbeth Reyes  YOB: 1950  MEDICAL RECORD NUMBER:  [id number]    The patient is being assessed for  Admission    I agree that at least one RN has performed a thorough Head to Toe Skin Assessment on the patient. ALL assessment sites listed below have been assessed.      Areas assessed by both nurses:    Head, Face, Ears, Shoulders, Back, Chest, Arms, Elbows, Hands, Sacrum. Buttock, Coccyx, Ischium, Legs. Feet and Heels, and Under Medical Devices         Does the Patient have a Wound? No noted wound(s)       Coleman Prevention initiated by RN: Yes  Wound Care Orders initiated by RN: No    Pressure Injury (Stage 3,4, Unstageable, DTI, NWPT, and Complex wounds) if present, place Wound referral order by RN under : No    New Ostomies, if present place, Ostomy referral order under : No     Nurse 1 eSignature: Electronically signed by Manuela Heaton RN on 11/11/24 at 6:31 AM EST    **SHARE this note so that the co-signing nurse can place an eSignature**    Nurse 2 eSignature: Electronically signed by Valorie Hagen RN on 11/11/24 at 6:44 AM EST   
DC instructions reviewed with patient and pt's . Both verbalized understanding. No peripheral IV access. No other needs at this time.  
General Surgery   Brief Note       Patient resting in bed,  at bedside. Placing HOME CPAP in place.  Likes to wear this at home frequently while awake.     Spoke with Bedside RN about orders:  Continuous bedside SPO2 until tomorrow  AM  Patient may wear Home CPCP - if SPO2 falls below 92% - call Respiratory therapy to see if they can bleed in oxygen to her home CPAP.   If she cannot maintain SPO2 with CPAP and oxygen, then patient is to have NC oxygen 2-4L until AM.      Dr Mathur updated.    DREW TRINH, APRN - CNP    
Spiritual Health History and Assessment/Progress Note  [facility]    (P) Volunteer Encounter, Initial Encounter,  ,  ,      Name: Lisbeth Reyes MRN: [id number]    Age: 74 y.o.     Sex: female   Language: English   Mu-ism: Non-Episcopalian   Acute cholecystitis     Date: 11/12/2024            Total Time Calculated: (P) 10 min              Spiritual Assessment began in [facility] SURGICAL        Referral/Consult From: (P) Volunteer   Encounter Overview/Reason: (P) Volunteer Encounter, Initial Encounter  Service Provided For: (P) Patient and family together    Divya, Belief, Meaning:   Patient identifies as spiritual  Family/Friends identify as spiritual      Importance and Influence:  Patient has no beliefs influential to healthcare decision-making identified during this visit  Family/Friends have no beliefs influential to healthcare decision-making identified during this visit    Community:  Patient feels well-supported. Support system includes: Spouse/Partner  Family/Friends feel well-supported. Support system includes: Spouse/Partner    Assessment and Plan of Care:     Patient Interventions include: Facilitated expression of thoughts and feelings and Provided sacramental/Episcopalian ritual  Family/Friends Interventions include: Facilitated expression of thoughts and feelings and Provided sacramental/Episcopalian ritual    Patient Plan of Care: Spiritual Care available upon further referral  Family/Friends Plan of Care: Spiritual Care available upon further referral    Electronically signed by ALBA GRAVES on 11/12/2024 at 11:34 AM    [facility] volunteer Stan Villalpando  
w/ Reflex to MG    Collection Time: 11/12/24  3:25 AM   Result Value Ref Range    Sodium 140 136 - 145 mmol/L    Potassium 3.9 3.5 - 5.1 mmol/L    Chloride 103 98 - 107 mmol/L    CO2 27 20 - 29 mmol/L    Anion Gap 10 7 - 16 mmol/L    Glucose 123 (H) 70 - 99 mg/dL    BUN 15 8 - 23 MG/DL    Creatinine 0.93 0.60 - 1.10 MG/DL    Est, Glom Filt Rate 65 >60 ml/min/1.73m2    Calcium 10.7 (H) 8.8 - 10.2 MG/DL    Total Bilirubin 0.7 0.0 - 1.2 MG/DL     (H) 8 - 45 U/L    AST 83 (H) 15 - 37 U/L    Alk Phosphatase 96 35 - 104 U/L    Total Protein 6.2 (L) 6.3 - 8.2 g/dL    Albumin 3.2 3.2 - 4.6 g/dL    Globulin 3.0 2.3 - 3.5 g/dL    Albumin/Globulin Ratio 1.1 1.0 - 1.9         Assessment:     Principal Problem (Resolved):    Acute cholecystitis  Active Problems:    KAYLAH (obstructive sleep apnea)    UTI (urinary tract infection)        Plan/Recommendations/Medical Decision Making:     OK to DC after lunch  Patient is in agreement with this plan.  Continue CPAP as used at home   Will Rx Bactrim at dc for UTI (asymptomatic)  Follow up 2 weeks with Dr Kevan TRINH, APRN - CNP

## 2024-11-12 NOTE — DISCHARGE SUMMARY
[place] [address]  [phone]   Discharge Summary     Lisbeth Reyes  MRN: [id number]     : 1950     Age: 74 y.o.                          Admit date: 11/10/2024     Discharge date: 2024  Attending Physician: Ant Mathur MD  Primary Discharge Diagnosis:   Principal Problem (Resolved):    Acute cholecystitis  Active Problems:    KAYLAH (obstructive sleep apnea)    UTI (urinary tract infection)    Primary Operations or Procedures Performed :  Procedure(s):  CHOLECYSTECTOMY LAPAROSCOPIC     Brief History and Reason for Admission: Lisbeth Reyes was admitted with the following history of present illness:      Lisbeth Reyes is a 74 y.o. female who general surgery is admitting for acute cholecystitis.  The patient has a PMHx of paroxysmal A-fib (on Eliquis), sleep apnea, hypothyroidism, HLD, HTN, anxiety, basal cell carcinoma of cheek, acid reflux, OA (hips and knees), elevated blood uric acid level, and chronic back pain.   She presented 11/10/2024 with acute onset (during the day 11/10/2024) of severe upper abdominal pain with radiation to bilateral flanks with associated nausea and bilious vomiting.  Upper abdominal pain with radiation to bilateral flanks is described as constant, aching, sore, 10/10 at worst, not aggravated or alleviated by anything.  She experienced upper abdominal pain similar ,although not as severe, last week 11/3/24.  Upper abdominal pain 1 week ago(11/3/24) resolved over the course of a few days.    Patient endorses eating a hot dog 11/10/2024 prior to presenting symptoms onset.  Patient was doing well and in her normal state of health outside of her 2 episodes of upper abdominal pain.  Positive flatus and normal bowel movements with last bowel movement 11/10/2024.  Pt endorses daily bowel movements at base line but she occasionally has bm every other day that starts as Hard/formed and

## 2024-11-12 NOTE — DISCHARGE INSTRUCTIONS
DISCHARGE SUMMARY from Nurse    PATIENT INSTRUCTIONS:    After general anesthesia or intravenous sedation, for 24 hours or while taking prescription Narcotics:  Limit your activities  Do not drive and operate hazardous machinery  Do not make important personal or business decisions  Do  not drink alcoholic beverages  If you have not urinated within 8 hours after discharge, please contact your surgeon on call.    Report the following to your surgeon:  Excessive pain, swelling, redness or odor of or around the surgical area  Temperature over 100.5  Nausea and vomiting lasting longer than 4 hours or if unable to take medications  Any signs of decreased circulation or nerve impairment to extremity: change in color, persistent  numbness, tingling, coldness or increase pain  Any questions    What to do at Home:  Recommended activity: activity as tolerated, no heavy lifting over 10lbs.    If you experience any of the following symptoms fever greater than 100.5, nausea/vomiting lasting longer than 4 hours and not relieved by anything, new onset of pain/swelling/drainage/redness/odor coming from surgical sites, please follow up with Dr. Mathur.    *  Please give a list of your current medications to your Primary Care Provider.    *  Please update this list whenever your medications are discontinued, doses are      changed, or new medications (including over-the-counter products) are added.    *  Please carry medication information at all times in case of emergency situations.    These are general instructions for a healthy lifestyle:    No smoking/ No tobacco products/ Avoid exposure to second hand smoke  Surgeon General's Warning:  Quitting smoking now greatly reduces serious risk to your health.    Obesity, smoking, and sedentary lifestyle greatly increases your risk for illness    A healthy diet, regular physical exercise & weight monitoring are important for maintaining a healthy lifestyle    You may be retaining

## 2024-11-13 ENCOUNTER — CARE COORDINATION (OUTPATIENT)
Dept: CARE COORDINATION | Facility: CLINIC | Age: 74
End: 2024-11-13

## 2024-11-13 NOTE — CARE COORDINATION
Care Transitions Note    Initial Call - Call within 2 business days of discharge: Yes    Patient Current Location:  Home: [address] [place] [id number]    Care Transition Nurse contacted the patient by telephone to perform post hospital discharge assessment, verified name and  as identifiers. Provided introduction to self, and explanation of the Care Transition Nurse role.     Patient: Lisbeth Reyes    Patient : 1950   MRN: [id number]    Reason for Admission: CHOLECYSTECTOMY LAPAROSCOPIC   Discharge Date: 24  RURS: Readmission Risk Score: 12      Last Discharge Facility       Date Complaint Diagnosis Description Type Department Provider    11/10/24 Abdominal Pain; Flank Pain Cholecystitis ... ED to Hosp-Admission (Discharged) (ADMITTED) [id number] Samano MD; Shelley...            Was this an external facility discharge? No    Additional needs identified to be addressed with provider   No needs identified             Method of communication with provider: none.    Patients top risk factors for readmission: medical condition-CHOLECYSTECTOMY LAPAROSCOPIC,HTN,A-fib, HTN    Interventions to address risk factors:   Reviewed discharge instructions and offered opportunity to ask any questions regarding discharge instructions.  Confirmed medications obtained and taking as ordered  Reviewed upcoming follow up appointments:  [facility]-2024 at 2:40 pm  Patent to call PCP if needed  Patient/Family to contact CTN with any issues, questions, or concerns prior to next outreach.    Care Transition Nurse reviewed discharge instructions, medical action plan, and red flags with patient. The patient was given an opportunity to ask questions; no further questions or concerns at this time.. The patient verbalized understanding.   Were discharge instructions available to patient? Yes.   Reviewed appropriate site of care based on symptoms and resources available to patient

## 2024-11-14 ENCOUNTER — PATIENT MESSAGE (OUTPATIENT)
Dept: ORTHOPEDIC SURGERY | Age: 74
End: 2024-11-14

## 2024-11-18 NOTE — TELEPHONE ENCOUNTER
Called and spoke with pt. Pt was informed to rescheduled MRI and follow up with Oh once she is feeling better from surgery. Pt voiced understanding.

## 2024-11-20 ENCOUNTER — OFFICE VISIT (OUTPATIENT)
Dept: SURGERY | Age: 74
End: 2024-11-20

## 2024-11-20 ENCOUNTER — CARE COORDINATION (OUTPATIENT)
Dept: CARE COORDINATION | Facility: CLINIC | Age: 74
End: 2024-11-20

## 2024-11-20 DIAGNOSIS — Z90.49 S/P CHOLECYSTECTOMY: Primary | ICD-10-CM

## 2024-11-20 PROCEDURE — 99024 POSTOP FOLLOW-UP VISIT: CPT

## 2024-11-20 NOTE — CARE COORDINATION
Care Transitions Note    Follow Up Call     Patient Current Location:  [place] [phone]    N Care Coordinator contacted the patient by telephone. Verified name and  as identifiers.    Additional needs identified to be addressed with provider   No needs identified                 Method of communication with provider: none.    Care Summary Note: Patient reports doing fine at time of call.  She reports improving daily.  She is scheduled for her post op visit with Counts include [facility] beds at the [facility] this afternoon.  No questions or concerns voiced.        Advance Care Planning:   Does patient have an Advance Directive: reviewed during previous call, see note. .    Medication Review:  No changes since last call.       Assessments:   Goals Addressed                   This Visit's Progress     Returns to baseline activity level.   On track       Patient/Family able to obtain medicine after d/c     Patient/Family able to verbalize medicine changes     Patient/Family aware and attends follow up appointments s/p d/c.     Patient/Family agrees to notify provider of any barriers to plan of care.    Patient/Family agrees to notify provider of any symptoms that indicate a worsening of condition    Patient/Family agrees to monitor and record weights daily and report a gain of 2 lbs in a day or 5 lbs in a week to MD for review.    Patient/Family agrees to monitor and record BP and BS daily for MD review.                Follow Up Appointment:   Reviewed upcoming appointment(s).  Future Appointments         Provider Specialty Dept Phone    2024 2:40 PM Carlyle Tavarez PA General Surgery [phone]    2024 1:35 PM [facility] MRI Radiology [phone]    12/3/2024 12:30 PM Oh Ceballos PA Orthopedic Surgery [phone]    3/27/2025 3:15 PM Oh Pickard MD Cardiology [phone]    4/15/2025 3:15 PM Emani Alvarez MD Family Medicine [phone]    10/21/2025 11:00 AM Emani Alvarez MD

## 2024-11-20 NOTE — PROGRESS NOTES
poDate: 2024      Name: Lisbeth Reyes      MRN: [id number]       : 1950       Age: 74 y.o.    Sex: female        Emani Alvarez MD       CC:    Chief Complaint   Patient presents with    Post-Op Check     S/p 2024-CHOLECYSTECTOMY LAPAROSCOPIC         HPI:  The patient presents for a post-op visit s/p cholecystectomy. The patient reports that they are doing well. Pt has some mild post op pain, however, they deny any new or worsening symptoms. Pt denies any difficulty with eating, voiding, or movement. Wound is well appearing with no signs of infection.      FINAL PATHOLOGIC DIAGNOSIS     Gallbladder, cholecystectomy:   Acute and chronic cholecystitis with cholelithiasis.     Physical Exam:     There were no vitals taken for this visit.    General: Alert, oriented, cooperative in no acute distress.     Neck: Supple, trachea midline, no appreciable thyromegaly  Resp: Breathing is  non-labored. Lungs clear to auscultation without wheezing or rhonchi   CV: RRR. No murmurs, rubs or gallops appreciated.  Abd: soft non-tender and non-distended without peritoneal signs. +bs    Skin/incision: No evidence of infection.      Assessment/Plan:  Lisbeth Reyes is a 74 y.o. female who is s/p cholecystectomy.    - Staples were removed.  - avoid heavy lifting for 4-6 weeks.  - Avoid submerging in water for 3 weeks post op.  - Call with any questions or concerns.  - Follow up as needed.      SHAWNA Sheppard   2024  2:53 PM

## 2024-11-27 ENCOUNTER — CARE COORDINATION (OUTPATIENT)
Dept: CARE COORDINATION | Facility: CLINIC | Age: 74
End: 2024-11-27

## 2024-11-27 NOTE — CARE COORDINATION
Care Transitions Note    Follow Up Call     Patient Current Location:  Home[place] [phone]    [facility] Care Coordinator contacted the patient by telephone. Verified name and  as identifiers.    Additional needs identified to be addressed with provider   No needs identified                 Method of communication with provider: none.    Care Summary Note: Patient reports doing great at time of call.  She was seen for post op s/p cholecystectomy 24 at which time her staples were removed with well appearing wound and no signs of infection per notes. She is to avoid heavy lifting 4-6 weeks post op and will f/u prn.   Patient reports no questions or concerns at this time.    Advance Care Planning:   Does patient have an Advance Directive: reviewed during previous call, see note. .    Medication Review:  No changes since last call.     Assessments:   Goals Addressed                   This Visit's Progress     Returns to baseline activity level.   On track       Patient/Family able to obtain medicine after d/c     Patient/Family able to verbalize medicine changes     Patient/Family aware and attends follow up appointments s/p d/c.     Patient/Family agrees to notify provider of any barriers to plan of care.    Patient/Family agrees to notify provider of any symptoms that indicate a worsening of condition    Patient/Family agrees to monitor and record weights daily and report a gain of 2 lbs in a day or 5 lbs in a week to MD for review.    Patient/Family agrees to monitor and record BP and BS daily for MD review.                Follow Up Appointment:   Reviewed upcoming appointment(s).  Future Appointments         Provider Specialty Dept Phone    12/3/2024 12:30 PM Oh Ceballos PA Orthopedic Surgery [phone]    3/27/2025 3:15 PM Oh Pickard MD Cardiology [phone]    4/15/2025 3:15 PM Emani Alvarez MD Family Medicine [phone]    10/21/2025 11:00 AM Emani Alvarez MD

## 2024-12-03 ENCOUNTER — OFFICE VISIT (OUTPATIENT)
Dept: ORTHOPEDIC SURGERY | Age: 74
End: 2024-12-03
Payer: MEDICARE

## 2024-12-03 VITALS — BODY MASS INDEX: 38.98 KG/M2 | WEIGHT: 220 LBS | HEIGHT: 63 IN

## 2024-12-03 DIAGNOSIS — M48.061 LUMBAR FORAMINAL STENOSIS: Primary | ICD-10-CM

## 2024-12-03 DIAGNOSIS — M47.816 LUMBAR SPONDYLOSIS: ICD-10-CM

## 2024-12-03 PROCEDURE — G2211 COMPLEX E/M VISIT ADD ON: HCPCS | Performed by: PHYSICIAN ASSISTANT

## 2024-12-03 PROCEDURE — 1123F ACP DISCUSS/DSCN MKR DOCD: CPT | Performed by: PHYSICIAN ASSISTANT

## 2024-12-03 PROCEDURE — 99214 OFFICE O/P EST MOD 30 MIN: CPT | Performed by: PHYSICIAN ASSISTANT

## 2024-12-03 NOTE — PROGRESS NOTES
T2 hyperintensities, incompletely characterized but  probably cysts.      Level specific findings:    T12-L1: Mild disc degeneration. No significant canal or foraminal stenosis.    L1-L2: Mild disc bulge. No significant canal or foraminal stenosis.    L2-L3: Disc bulge with mild to moderate bilateral neuroforaminal narrowing and  mild spinal canal stenosis.    L3-L4: Mild disc bulge with mild to moderate bilateral neuroforaminal narrowing.  Minimal spinal canal stenosis.    L4-L5: Right asymmetric facet arthropathy and bulging of the disc. There is  moderate to severe right and mild to moderate left neuroforaminal narrowing.  Mild to moderate spinal canal stenosis.    L5-S1: Bilateral facet arthropathy with mild to moderate left and mild right  neuroforaminal narrowing. No significant canal stenosis.    Impression  1.  Lumbar spine degenerative changes are worst at L4-L5, where there is  moderate to severe right neuroforaminal narrowing and mild to moderate spinal  canal stenosis.    2.  Lumbar spine degenerative features are otherwise as fully detailed above.      Electronically signed by KAREN MCCALLUM            Assessment and Plan    I reviewed the MRI findings with the patient.  I personally reviewed the lumbar images myself.  Multilevel degenerative disc disease.  Foraminal stenosis most prominent at L4-5.  Greater on the right than the left.  Relatively mild foraminal stenosis at other levels.  Mild spinal canal stenosis at multiple levels.  Given she is doing well clinically at this time I recommend she continue her directed home exercise program daily.  I will follow-up with patient in 6 months for recheck of her symptoms.  But if her pain returns I recommend right L4 selective nerve root block with Dr. Hurtado or Dr. Chavez.    Clinical Notes:   I/my clinic will serve as the continuing focal point for all needed health care services and/or medical care services that are part of ongoing PAIN care related to

## 2024-12-05 ENCOUNTER — CARE COORDINATION (OUTPATIENT)
Dept: CARE COORDINATION | Facility: CLINIC | Age: 74
End: 2024-12-05

## 2024-12-05 NOTE — CARE COORDINATION
LPN Care Coordinator provided contact information.  Plan for follow-up call in 6-10 days based on severity of symptoms and risk factors.  Plan for next call: symptom management    Sandra Saini LPN

## 2024-12-06 DIAGNOSIS — E11.9 TYPE 2 DIABETES MELLITUS WITHOUT COMPLICATIONS (HCC): ICD-10-CM

## 2024-12-06 RX ORDER — METFORMIN HYDROCHLORIDE 500 MG/1
TABLET, EXTENDED RELEASE ORAL
Qty: 180 TABLET | Refills: 1 | OUTPATIENT
Start: 2024-12-06

## 2024-12-12 PROBLEM — N39.0 UTI (URINARY TRACT INFECTION): Status: RESOLVED | Noted: 2024-11-12 | Resolved: 2024-12-12

## 2024-12-13 ENCOUNTER — CARE COORDINATION (OUTPATIENT)
Dept: CARE COORDINATION | Facility: CLINIC | Age: 74
End: 2024-12-13

## 2024-12-13 NOTE — CARE COORDINATION
Care Transitions Note    Final Call     Patient Current Location:  Home[place] [address]    LPN Care Coordinator contacted the patient by telephone. Verified name and  as identifiers.    Patient graduated from the Care Transitions program on 24.  Patient/family verbalizes confidence in the ability to self-manage at this time..      Advance Care Planning:   Does patient have an Advance Directive: reviewed during previous call, see note. .    Handoff:   Patient was not referred to the ACM team due to patient declined services.      Care Summary Note: Mrs. Reyes reports doing well and is gaining strength daily.  She verbalized confidence in self managing her health at this time.  Provided this LPN Cc'c contact number should I be of assistance in the future.    Assessments:   Goals Addressed                   This Visit's Progress     COMPLETED: Returns to baseline activity level.          Patient/Family able to obtain medicine after d/c     Patient/Family able to verbalize medicine changes     Patient/Family aware and attends follow up appointments s/p d/c.     Patient/Family agrees to notify provider of any barriers to plan of care.    Patient/Family agrees to notify provider of any symptoms that indicate a worsening of condition    Patient/Family agrees to monitor and record weights daily and report a gain of 2 lbs in a day or 5 lbs in a week to MD for review.    Patient/Family agrees to monitor and record BP and BS daily for MD review.                Upcoming Appointments:    Future Appointments         Provider Specialty Dept Phone    3/27/2025 3:15 PM Oh Pickard MD Cardiology [phone]    4/15/2025 3:15 PM Emani Alvarez MD Family Medicine [phone]    6/3/2025 1:00 PM Oh Ceballos PA Orthopedic Surgery [phone]    10/21/2025 11:00 AM mEani Alvarez MD Family Medicine [phone]    11/10/2025 2:45 PM (Arrive by 2:30 PM) Senthil Benton, APRN - CNP

## 2024-12-23 RX ORDER — METFORMIN HYDROCHLORIDE 500 MG/1
TABLET, EXTENDED RELEASE ORAL
Qty: 180 TABLET | Refills: 1 | Status: SHIPPED | OUTPATIENT
Start: 2024-12-23

## 2025-01-29 DIAGNOSIS — M47.816 LUMBAR SPONDYLOSIS: ICD-10-CM

## 2025-01-29 RX ORDER — TIZANIDINE 2 MG/1
2 TABLET ORAL 3 TIMES DAILY PRN
Qty: 270 TABLET | OUTPATIENT
Start: 2025-01-29

## 2025-02-24 ENCOUNTER — TELEPHONE (OUTPATIENT)
Dept: ORTHOPEDIC SURGERY | Age: 75
End: 2025-02-24

## 2025-02-24 NOTE — TELEPHONE ENCOUNTER
LM for patient that we needed to move her to Friday due to [facility] being out    Burow's Advancement Flap Text: The defect edges were debeveled with a #15 scalpel blade. Given the location of the defect and the proximity to free margins a Burow's advancement flap was deemed most appropriate. Using a sterile surgical marker, the appropriate advancement flap was drawn incorporating the defect and placing the expected incisions within the relaxed skin tension lines where possible. The area thus outlined was incised deep to adipose tissue with a #15 scalpel blade. The skin margins were undermined to an appropriate distance in all directions utilizing iris scissors. Following this, the designed flap was advanced and carried over into the primary defect and sutured into place.

## 2025-02-28 ENCOUNTER — OFFICE VISIT (OUTPATIENT)
Dept: ORTHOPEDIC SURGERY | Age: 75
End: 2025-02-28

## 2025-02-28 DIAGNOSIS — M25.552 LEFT HIP PAIN: Primary | ICD-10-CM

## 2025-02-28 DIAGNOSIS — M25.562 LEFT KNEE PAIN, UNSPECIFIED CHRONICITY: ICD-10-CM

## 2025-02-28 RX ORDER — TRIAMCINOLONE ACETONIDE 40 MG/ML
40 INJECTION, SUSPENSION INTRA-ARTICULAR; INTRAMUSCULAR ONCE
Status: COMPLETED | OUTPATIENT
Start: 2025-02-28 | End: 2025-02-28

## 2025-02-28 RX ADMIN — TRIAMCINOLONE ACETONIDE 40 MG: 40 INJECTION, SUSPENSION INTRA-ARTICULAR; INTRAMUSCULAR at 11:24

## 2025-02-28 NOTE — PROGRESS NOTES
Name: Lisbeth Reyes  YOB: 1950  Gender: female  MRN: [id number]    CC:   Chief Complaint   Patient presents with    Knee Pain     R knee new patient     Hip Pain     R hip new patient        HPI:   The pain has been present for months and is becoming worse.  It hurts not now with sleep at night.  The pain is located over the knee and hip regions.  She has been managed for spine  it does hurt to walk and gets worse with increased distances.   The pain does not radiate down the leg.  Numbness and tingling are not noted.   Treatment so far has been anti-inflammatory medications.      Review of Systems  As per HPI.  Pertinent positives and negatives are addressed with the patient, particularly those related to musculoskeletal concerns.  Non-orthopaedic concerns were referred back to the primary care physician.      PMFSH:    Current Outpatient Medications:     metFORMIN (GLUCOPHAGE-XR) 500 MG extended release tablet, TAKE 2 TABLETS BY MOUTH EVERY DAY AT HEAVIEST MEAL, Disp: 180 tablet, Rfl: 1    tiZANidine (ZANAFLEX) 2 MG tablet, Take 1 tablet by mouth 3 times daily as needed (muscle spasm), Disp: 90 tablet, Rfl: 2    cyanocobalamin 1000 MCG/ML injection, 1 ml IM q month Dx: pernicious anemia Indications: anemia due to vitamin B12 deficiency-pernicious anemia, Disp: 10 mL, Rfl: 5    FLUoxetine (PROZAC) 20 MG capsule, TAKE 1 TO 2 CAPSULES BY MOUTH EVERY MORNING FOR MOOD, Disp: 180 capsule, Rfl: 2    furosemide (LASIX) 20 MG tablet, TAKE 1 TO 2 TABLETS BY MOUTH EVERY MORNING, Disp: 180 tablet, Rfl: 2    levothyroxine (SYNTHROID) 88 MCG tablet, Take 1 tablet by mouth every morning (before breakfast), Disp: 90 tablet, Rfl: 1    lisinopril-hydroCHLOROthiazide (PRINZIDE;ZESTORETIC) 20-12.5 MG per tablet, Take 1 tablet by mouth daily, Disp: 90 tablet, Rfl: 2    montelukast (SINGULAIR) 10 MG tablet, TAKE 1 TABLET BY MOUTH DAILY FOR ALLERGIES, Disp: 90 tablet, Rfl: 2    omeprazole (PRILOSEC) 20 MG

## 2025-03-26 ENCOUNTER — OFFICE VISIT (OUTPATIENT)
Dept: FAMILY MEDICINE CLINIC | Facility: CLINIC | Age: 75
End: 2025-03-26
Payer: MEDICARE

## 2025-03-26 VITALS
OXYGEN SATURATION: 99 % | HEIGHT: 63 IN | HEART RATE: 50 BPM | TEMPERATURE: 97.3 F | SYSTOLIC BLOOD PRESSURE: 124 MMHG | DIASTOLIC BLOOD PRESSURE: 43 MMHG | WEIGHT: 207.2 LBS | BODY MASS INDEX: 36.71 KG/M2

## 2025-03-26 DIAGNOSIS — R11.0 NAUSEA: ICD-10-CM

## 2025-03-26 DIAGNOSIS — E03.9 ACQUIRED HYPOTHYROIDISM: ICD-10-CM

## 2025-03-26 DIAGNOSIS — E11.9 TYPE 2 DIABETES MELLITUS WITHOUT COMPLICATION, WITHOUT LONG-TERM CURRENT USE OF INSULIN: ICD-10-CM

## 2025-03-26 DIAGNOSIS — K52.9 CHRONIC DIARRHEA: Primary | ICD-10-CM

## 2025-03-26 DIAGNOSIS — L72.0 EIC (EPIDERMAL INCLUSION CYST): ICD-10-CM

## 2025-03-26 DIAGNOSIS — K52.9 CHRONIC DIARRHEA: ICD-10-CM

## 2025-03-26 LAB
ALBUMIN SERPL-MCNC: 3.6 G/DL (ref 3.2–4.6)
ALBUMIN/GLOB SERPL: 1.1 (ref 1–1.9)
ALP SERPL-CCNC: 97 U/L (ref 35–104)
ALT SERPL-CCNC: 22 U/L (ref 8–45)
ANION GAP SERPL CALC-SCNC: 12 MMOL/L (ref 7–16)
AST SERPL-CCNC: 29 U/L (ref 15–37)
BASOPHILS # BLD: 0.11 K/UL (ref 0–0.2)
BASOPHILS NFR BLD: 0.9 % (ref 0–2)
BILIRUB SERPL-MCNC: 0.5 MG/DL (ref 0–1.2)
BUN SERPL-MCNC: 11 MG/DL (ref 8–23)
C. DIFFICILE TOXIN MOLECULAR: NEGATIVE
CALCIUM SERPL-MCNC: 10.8 MG/DL (ref 8.8–10.2)
CHLORIDE SERPL-SCNC: 97 MMOL/L (ref 98–107)
CO2 SERPL-SCNC: 32 MMOL/L (ref 20–29)
CREAT SERPL-MCNC: 0.95 MG/DL (ref 0.6–1.1)
DIFFERENTIAL METHOD BLD: ABNORMAL
EOSINOPHIL # BLD: 0.13 K/UL (ref 0–0.8)
EOSINOPHIL NFR BLD: 1.1 % (ref 0.5–7.8)
ERYTHROCYTE [DISTWIDTH] IN BLOOD BY AUTOMATED COUNT: 13.8 % (ref 11.9–14.6)
EST. AVERAGE GLUCOSE BLD GHB EST-MCNC: 118 MG/DL
GLOBULIN SER CALC-MCNC: 3.3 G/DL (ref 2.3–3.5)
GLUCOSE SERPL-MCNC: 105 MG/DL (ref 70–99)
HBA1C MFR BLD: 5.8 % (ref 0–5.6)
HCT VFR BLD AUTO: 45.3 % (ref 35.8–46.3)
HGB BLD-MCNC: 14.5 G/DL (ref 11.7–15.4)
IMM GRANULOCYTES # BLD AUTO: 0.05 K/UL (ref 0–0.5)
IMM GRANULOCYTES NFR BLD AUTO: 0.4 % (ref 0–5)
LYMPHOCYTES # BLD: 1.95 K/UL (ref 0.5–4.6)
LYMPHOCYTES NFR BLD: 16.3 % (ref 13–44)
MCH RBC QN AUTO: 29.2 PG (ref 26.1–32.9)
MCHC RBC AUTO-ENTMCNC: 32 G/DL (ref 31.4–35)
MCV RBC AUTO: 91.1 FL (ref 82–102)
MONOCYTES # BLD: 1.1 K/UL (ref 0.1–1.3)
MONOCYTES NFR BLD: 9.2 % (ref 4–12)
NEUTS SEG # BLD: 8.59 K/UL (ref 1.7–8.2)
NEUTS SEG NFR BLD: 72.1 % (ref 43–78)
NRBC # BLD: 0 K/UL (ref 0–0.2)
PLATELET # BLD AUTO: 273 K/UL (ref 150–450)
PMV BLD AUTO: 12 FL (ref 9.4–12.3)
POTASSIUM SERPL-SCNC: 3.6 MMOL/L (ref 3.5–5.1)
PROT SERPL-MCNC: 7 G/DL (ref 6.3–8.2)
RBC # BLD AUTO: 4.97 M/UL (ref 4.05–5.2)
SODIUM SERPL-SCNC: 140 MMOL/L (ref 136–145)
TSH, 3RD GENERATION: 4.3 UIU/ML (ref 0.27–4.2)
WBC # BLD AUTO: 11.9 K/UL (ref 4.3–11.1)

## 2025-03-26 PROCEDURE — 1123F ACP DISCUSS/DSCN MKR DOCD: CPT | Performed by: PHYSICIAN ASSISTANT

## 2025-03-26 PROCEDURE — 99214 OFFICE O/P EST MOD 30 MIN: CPT | Performed by: PHYSICIAN ASSISTANT

## 2025-03-26 PROCEDURE — 3078F DIAST BP <80 MM HG: CPT | Performed by: PHYSICIAN ASSISTANT

## 2025-03-26 PROCEDURE — G2211 COMPLEX E/M VISIT ADD ON: HCPCS | Performed by: PHYSICIAN ASSISTANT

## 2025-03-26 PROCEDURE — 1160F RVW MEDS BY RX/DR IN RCRD: CPT | Performed by: PHYSICIAN ASSISTANT

## 2025-03-26 PROCEDURE — 1126F AMNT PAIN NOTED NONE PRSNT: CPT | Performed by: PHYSICIAN ASSISTANT

## 2025-03-26 PROCEDURE — 1159F MED LIST DOCD IN RCRD: CPT | Performed by: PHYSICIAN ASSISTANT

## 2025-03-26 PROCEDURE — 3074F SYST BP LT 130 MM HG: CPT | Performed by: PHYSICIAN ASSISTANT

## 2025-03-26 RX ORDER — ONDANSETRON 4 MG/1
4 TABLET, FILM COATED ORAL 3 TIMES DAILY PRN
Qty: 30 TABLET | Refills: 0 | Status: SHIPPED | OUTPATIENT
Start: 2025-03-26

## 2025-03-26 ASSESSMENT — ENCOUNTER SYMPTOMS
DIARRHEA: 1
BLOOD IN STOOL: 0
ABDOMINAL DISTENTION: 0
NAUSEA: 1
VOMITING: 0
SHORTNESS OF BREATH: 0
ABDOMINAL PAIN: 0
ANAL BLEEDING: 0
CONSTIPATION: 0

## 2025-03-26 ASSESSMENT — PATIENT HEALTH QUESTIONNAIRE - PHQ9
SUM OF ALL RESPONSES TO PHQ QUESTIONS 1-9: 0
2. FEELING DOWN, DEPRESSED OR HOPELESS: NOT AT ALL
1. LITTLE INTEREST OR PLEASURE IN DOING THINGS: NOT AT ALL

## 2025-03-26 NOTE — PROGRESS NOTES
[facility] [address] [place] [address]  Phone [phone]      Patient: Lisbeth Reyes  YOB: 1950  Age 74 y.o.  Sex female  Medical Record:  [id number]  Visit Date: 03/26/25  Author:  Ant Anderson PA-C    [facility] Note    Chief Complaint   Patient presents with    Diarrhea    Nausea    Other     Patient has a spot on her chest that she would like to discuss.       History of Present Illness  History of Present Illness  The patient is a 74-year-old female who presents for evaluation of diarrhea and nausea.    She underwent a cholecystectomy in 11/2024, after which normal bowel movements were experienced until 02/28/2025, when diarrhea began. Initially attributing this to a viral infection, she has since been unable to have a complete bowel movement, often passing only gas or small amounts of loose stool. She reports no solid stools since the onset of her symptoms. Her last colonoscopy was 5 years ago. No current abdominal pain, hematochezia, melena, fever, or chills are reported. Diet monitoring is ongoing.  She has not had any recent medication changes.  No recent antibiotics.  Probiotics are not taken, and over-the-counter medications such as Imodium have not been used for symptoms.  She notes increased frequency of loose stools during the day.  Approximately 6 bowel movements occurred yesterday, which were not voluminous.    Mild nausea occurs daily but she denies vomiting.    An epidermal inclusion cyst that was previously drained by Dr. Alvarez has since recurred. The cyst does not drain but becomes inflamed. Tenderness was noted a few weeks ago, prompting consideration of treatment.     Previously on metformin and Ozempic for borderline diabetes and obesity.  She discontinued the metformin about a year ago after losing considerable weight, noting that her blood sugars seem to be well-controlled.  Her last hemoglobin A1c was 5.6% (6/2024).  She

## 2025-03-26 NOTE — PATIENT INSTRUCTIONS
*Try a probiotic such as Servoy Colon Health, Florajen, or Align. A probiotic helps to restore the good bacteria in your digestive tract.  to \"reset\" the colon.   *You can use over the counter Imodium as needed to help slow the frequency of the loose stools.  *Use the Zofran as needed for nausea.

## 2025-03-27 ENCOUNTER — OFFICE VISIT (OUTPATIENT)
Age: 75
End: 2025-03-27
Payer: MEDICARE

## 2025-03-27 VITALS
HEART RATE: 52 BPM | WEIGHT: 204 LBS | DIASTOLIC BLOOD PRESSURE: 64 MMHG | BODY MASS INDEX: 36.14 KG/M2 | HEIGHT: 63 IN | SYSTOLIC BLOOD PRESSURE: 132 MMHG

## 2025-03-27 DIAGNOSIS — I10 ESSENTIAL HYPERTENSION: ICD-10-CM

## 2025-03-27 DIAGNOSIS — E78.2 MIXED HYPERLIPIDEMIA: ICD-10-CM

## 2025-03-27 DIAGNOSIS — R60.0 BILATERAL LOWER EXTREMITY EDEMA: ICD-10-CM

## 2025-03-27 DIAGNOSIS — I48.0 PAROXYSMAL ATRIAL FIBRILLATION (HCC): Primary | ICD-10-CM

## 2025-03-27 PROCEDURE — 93000 ELECTROCARDIOGRAM COMPLETE: CPT | Performed by: INTERNAL MEDICINE

## 2025-03-27 PROCEDURE — 1159F MED LIST DOCD IN RCRD: CPT | Performed by: INTERNAL MEDICINE

## 2025-03-27 PROCEDURE — 1126F AMNT PAIN NOTED NONE PRSNT: CPT | Performed by: INTERNAL MEDICINE

## 2025-03-27 PROCEDURE — 3078F DIAST BP <80 MM HG: CPT | Performed by: INTERNAL MEDICINE

## 2025-03-27 PROCEDURE — 1123F ACP DISCUSS/DSCN MKR DOCD: CPT | Performed by: INTERNAL MEDICINE

## 2025-03-27 PROCEDURE — 1160F RVW MEDS BY RX/DR IN RCRD: CPT | Performed by: INTERNAL MEDICINE

## 2025-03-27 PROCEDURE — 3075F SYST BP GE 130 - 139MM HG: CPT | Performed by: INTERNAL MEDICINE

## 2025-03-27 PROCEDURE — 99214 OFFICE O/P EST MOD 30 MIN: CPT | Performed by: INTERNAL MEDICINE

## 2025-03-27 ASSESSMENT — ENCOUNTER SYMPTOMS
HEMATOCHEZIA: 0
HOARSE VOICE: 0
HEMATEMESIS: 0
HEMOPTYSIS: 0
ABDOMINAL PAIN: 0
WHEEZING: 0
DOUBLE VISION: 0
EYE REDNESS: 0
STRIDOR: 0

## 2025-03-27 NOTE — PROGRESS NOTES
[facility] [[email or web address]] CARDIOLOGY  [address] [place] [address], [address]  [place] [address]  PHONE: [phone]          25    NAME:  Lisbeth Reyes  : 1950  MRN: [id number]         SUBJECTIVE:   Lisbeth Reyes is a 74 y.o. female seen for a visit regarding the following:     Chief Complaint   Patient presents with    Follow-up    Atrial Fibrillation    Hypertension    Hyperlipidemia           HPI:    Cardio problem list:  1.  A-fib-noted initially on  EKG from 3/26/2024  -Cardioverted back to sinus rhythm-2024  -Echo 2024-EF 60 to 65%, diastolic dysfunction noted, mildly dilated RV with normal RV SF, mild TR with RVSP 47, moderately dilated LA  2.  Hypertension  3.  Hyperlipidemia  4.  Type 2 diabetes mellitus  5.  Lower extremity edema  6.  Obesity  7.  Sleep apnea on CPAP    Dear Dr. Alvarez,  I saw Ms. Reyes who is a pleasant 74-year-old woman in cardiovascular follow-up for A-fib, hypertension, hyperlipidemia with underlying sleep apnea lower extremity edema.    When we last met with her, we made no significant changes with her medical therapy.  She has lost 16 pounds since she last saw us. We previously  set her up for cardioversion and got her back in sinus rhythm.  We got her through an echo which showed preserved LV systolic function with mildly dilated right ventricle with an RVSP of 47    Atrial fibrillation-diagnosed with it in 2024.  She has done very well since her cardioversion-2024.  No significant palpitations.  No TIAs or strokelike symptoms.  Remains on Eliquis for thromboembolic prophylaxis.  Remains on metoprolol succinate 100 mg once daily and flecainide which has maintained sinus rhythm ever since her cardioversion.    -No chest pain in any way.  No history of any known coronary artery disease.    Hypertension: Denies headaches blurry vision and remains compliant on her current therapy.  She had 1 episode of presyncope after she stood up from a seated

## 2025-03-27 NOTE — PATIENT INSTRUCTIONS
You could certainly cut back your water pill-furosemide to every other day considering your leg swelling is much better than what it used to be.

## 2025-03-29 LAB
BACTERIA SPEC CULT: NORMAL
SERVICE CMNT-IMP: NORMAL

## 2025-03-31 ENCOUNTER — OFFICE VISIT (OUTPATIENT)
Dept: ORTHOPEDIC SURGERY | Age: 75
End: 2025-03-31
Payer: MEDICARE

## 2025-03-31 DIAGNOSIS — M25.562 LEFT KNEE PAIN, UNSPECIFIED CHRONICITY: Primary | ICD-10-CM

## 2025-03-31 LAB
O+P SPEC MICRO: NORMAL
O+P STL CONC: NORMAL
SPECIMEN SOURCE: NORMAL

## 2025-03-31 PROCEDURE — 20611 DRAIN/INJ JOINT/BURSA W/US: CPT | Performed by: PHYSICIAN ASSISTANT

## 2025-03-31 NOTE — PROGRESS NOTES
Name: Lisbeth Reyes  YOB: 1950  Gender: female  MRN: [id number]      CC: Left Knee Pain     PROCEDURE: 1 of 3     DIAGNOSIS:   Encounter Diagnosis   Name Primary?    Left knee pain, unspecified chronicity Yes        besomebody. US unit with variable frequency (6.0-15.0 MHz) linear transducer was used to visualize the retropatellar fat pad, patella tendon, patella, tibia, and to ensure proper intra-articular needle placement.  Injection image was saved to patient's permanent chart.    Procedure Note: Time out was performed which included identifying the patient by name and date of birth.  The procedure site was identified with all present in agreement.   The patient was placed in upright position with knee hanging freely from exam table.  The left knee was prepped in sterile fashion using alcohol wipe.  Using Mindray ultrasound guidance, a 22 gauge needle was then introduced into the knee joint from an infrapatellar approach and 2 mL of Euflexxa was injected freely.  The needle was then removed, pressure hemostasis achieved, injection site was cleansed with alcohol wipe and dressed with band aid.    The patient tolerated the procedure without complication.  The patient  will follow up as scheduled.

## 2025-04-01 ENCOUNTER — RESULTS FOLLOW-UP (OUTPATIENT)
Dept: FAMILY MEDICINE CLINIC | Facility: CLINIC | Age: 75
End: 2025-04-01

## 2025-04-07 ENCOUNTER — OFFICE VISIT (OUTPATIENT)
Dept: ORTHOPEDIC SURGERY | Age: 75
End: 2025-04-07
Payer: MEDICARE

## 2025-04-07 DIAGNOSIS — M25.562 LEFT KNEE PAIN, UNSPECIFIED CHRONICITY: Primary | ICD-10-CM

## 2025-04-07 PROCEDURE — 20611 DRAIN/INJ JOINT/BURSA W/US: CPT | Performed by: PHYSICIAN ASSISTANT

## 2025-04-07 NOTE — PROGRESS NOTES
Name: Lisbeth Reyes  YOB: 1950  Gender: female  MRN: [id number]      CC: Left Knee Pain     PROCEDURE: 2 of 3 HP    DIAGNOSIS:   Encounter Diagnosis   Name Primary?    Left knee pain, unspecified chronicity Yes        Rivalry US unit with variable frequency (6.0-15.0 MHz) linear transducer was used to visualize the retropatellar fat pad, patella tendon, patella, tibia, and to ensure proper intra-articular needle placement.  Injection image was saved to patient's permanent chart.    Procedure Note: Time out was performed which included identifying the patient by name and date of birth.  The procedure site was identified with all present in agreement.   The patient was placed in upright position with knee hanging freely from exam table.  The left knee was prepped in sterile fashion using alcohol wipe.  Using Mindray ultrasound guidance, a 22 gauge needle was then introduced into the knee joint from an infrapatellar approach and 2 mL of Euflexxa was injected freely.  The needle was then removed, pressure hemostasis achieved, injection site was cleansed with alcohol wipe and dressed with band aid.    The patient tolerated the procedure without complication.  The patient  will follow up as scheduled.

## 2025-04-14 ENCOUNTER — OFFICE VISIT (OUTPATIENT)
Dept: ORTHOPEDIC SURGERY | Age: 75
End: 2025-04-14
Payer: MEDICARE

## 2025-04-14 DIAGNOSIS — M25.562 LEFT KNEE PAIN, UNSPECIFIED CHRONICITY: Primary | ICD-10-CM

## 2025-04-14 DIAGNOSIS — M17.12 OSTEOARTHRITIS OF LEFT KNEE, UNSPECIFIED OSTEOARTHRITIS TYPE: ICD-10-CM

## 2025-04-14 PROCEDURE — 20611 DRAIN/INJ JOINT/BURSA W/US: CPT | Performed by: PHYSICIAN ASSISTANT

## 2025-04-14 NOTE — PROGRESS NOTES
Name: Lisbeth Reyes  YOB: 1950  Gender: female  MRN: [id number]      CC: Left Knee Pain     PROCEDURE: 3 of 3 HP    DIAGNOSIS:   Encounter Diagnoses   Name Primary?    Left knee pain, unspecified chronicity Yes    Osteoarthritis of left knee, unspecified osteoarthritis type         GE US unit with variable frequency (6.0-15.0 MHz) linear transducer was used to visualize the retropatellar fat pad, patella tendon, patella, tibia, and to ensure proper intra-articular needle placement.  Injection image was saved to patient's permanent chart.    Procedure Note: Time out was performed which included identifying the patient by name and date of birth.  The procedure site was identified with all present in agreement.   The patient was placed in upright position with knee hanging freely from exam table.  The left knee was prepped in sterile fashion using alcohol wipe.  Using Mindray ultrasound guidance, a 22 gauge needle was then introduced into the knee joint from an infrapatellar approach and 2 mL of Euflexxa was injected freely.  The needle was then removed, pressure hemostasis achieved, injection site was cleansed with alcohol wipe and dressed with band aid.    The patient tolerated the procedure without complication.  She reports moderate relief of her left knee pain thus far and plans to return in 6 months to repeat HPE or sooner for cortisone injection, if needed.

## 2025-04-15 ENCOUNTER — OFFICE VISIT (OUTPATIENT)
Dept: FAMILY MEDICINE CLINIC | Facility: CLINIC | Age: 75
End: 2025-04-15
Payer: MEDICARE

## 2025-04-15 VITALS
OXYGEN SATURATION: 95 % | HEIGHT: 63 IN | WEIGHT: 215 LBS | TEMPERATURE: 97.1 F | DIASTOLIC BLOOD PRESSURE: 57 MMHG | HEART RATE: 55 BPM | SYSTOLIC BLOOD PRESSURE: 130 MMHG | BODY MASS INDEX: 38.09 KG/M2

## 2025-04-15 DIAGNOSIS — E78.5 HYPERLIPIDEMIA, UNSPECIFIED HYPERLIPIDEMIA TYPE: ICD-10-CM

## 2025-04-15 DIAGNOSIS — R60.9 EDEMA, UNSPECIFIED TYPE: ICD-10-CM

## 2025-04-15 DIAGNOSIS — E55.9 VITAMIN D DEFICIENCY: ICD-10-CM

## 2025-04-15 DIAGNOSIS — I50.42 CHRONIC COMBINED SYSTOLIC (CONGESTIVE) AND DIASTOLIC (CONGESTIVE) HEART FAILURE: ICD-10-CM

## 2025-04-15 DIAGNOSIS — E11.9 TYPE 2 DIABETES MELLITUS WITHOUT COMPLICATION, WITHOUT LONG-TERM CURRENT USE OF INSULIN: Primary | ICD-10-CM

## 2025-04-15 DIAGNOSIS — K59.1 FUNCTIONAL DIARRHEA: ICD-10-CM

## 2025-04-15 DIAGNOSIS — G47.33 OSA (OBSTRUCTIVE SLEEP APNEA): ICD-10-CM

## 2025-04-15 DIAGNOSIS — F41.1 GAD (GENERALIZED ANXIETY DISORDER): ICD-10-CM

## 2025-04-15 DIAGNOSIS — J30.1 NON-SEASONAL ALLERGIC RHINITIS DUE TO POLLEN: ICD-10-CM

## 2025-04-15 DIAGNOSIS — E79.0 ELEVATED BLOOD URIC ACID LEVEL: ICD-10-CM

## 2025-04-15 DIAGNOSIS — E66.813 CLASS 3 SEVERE OBESITY DUE TO EXCESS CALORIES WITHOUT SERIOUS COMORBIDITY WITH BODY MASS INDEX (BMI) OF 40.0 TO 44.9 IN ADULT: ICD-10-CM

## 2025-04-15 DIAGNOSIS — E03.9 ACQUIRED HYPOTHYROIDISM: ICD-10-CM

## 2025-04-15 DIAGNOSIS — I48.19 PERSISTENT ATRIAL FIBRILLATION (HCC): ICD-10-CM

## 2025-04-15 DIAGNOSIS — K21.9 GASTROESOPHAGEAL REFLUX DISEASE WITHOUT ESOPHAGITIS: ICD-10-CM

## 2025-04-15 DIAGNOSIS — Z12.31 ENCOUNTER FOR SCREENING MAMMOGRAM FOR BREAST CANCER: ICD-10-CM

## 2025-04-15 DIAGNOSIS — I50.22 CHRONIC SYSTOLIC (CONGESTIVE) HEART FAILURE: ICD-10-CM

## 2025-04-15 DIAGNOSIS — E53.8 B12 DEFICIENCY: ICD-10-CM

## 2025-04-15 DIAGNOSIS — R10.84 GENERALIZED ABDOMINAL PAIN: ICD-10-CM

## 2025-04-15 DIAGNOSIS — R19.7 OVERFLOW DIARRHEA: ICD-10-CM

## 2025-04-15 DIAGNOSIS — Z78.0 ASYMPTOMATIC MENOPAUSE: ICD-10-CM

## 2025-04-15 DIAGNOSIS — I10 ESSENTIAL HYPERTENSION: ICD-10-CM

## 2025-04-15 DIAGNOSIS — E55.9 VITAMIN D DEFICIENCY, UNSPECIFIED: ICD-10-CM

## 2025-04-15 PROCEDURE — 3074F SYST BP LT 130 MM HG: CPT | Performed by: FAMILY MEDICINE

## 2025-04-15 PROCEDURE — 1159F MED LIST DOCD IN RCRD: CPT | Performed by: FAMILY MEDICINE

## 2025-04-15 PROCEDURE — G2211 COMPLEX E/M VISIT ADD ON: HCPCS | Performed by: FAMILY MEDICINE

## 2025-04-15 PROCEDURE — 1160F RVW MEDS BY RX/DR IN RCRD: CPT | Performed by: FAMILY MEDICINE

## 2025-04-15 PROCEDURE — 1123F ACP DISCUSS/DSCN MKR DOCD: CPT | Performed by: FAMILY MEDICINE

## 2025-04-15 PROCEDURE — 3044F HG A1C LEVEL LT 7.0%: CPT | Performed by: FAMILY MEDICINE

## 2025-04-15 PROCEDURE — 99214 OFFICE O/P EST MOD 30 MIN: CPT | Performed by: FAMILY MEDICINE

## 2025-04-15 PROCEDURE — 3078F DIAST BP <80 MM HG: CPT | Performed by: FAMILY MEDICINE

## 2025-04-15 RX ORDER — OMEPRAZOLE 20 MG/1
CAPSULE, DELAYED RELEASE ORAL
Qty: 90 CAPSULE | Refills: 2 | Status: SHIPPED | OUTPATIENT
Start: 2025-04-15

## 2025-04-15 RX ORDER — ATORVASTATIN CALCIUM 20 MG/1
TABLET, FILM COATED ORAL
Qty: 90 TABLET | Refills: 2 | Status: SHIPPED | OUTPATIENT
Start: 2025-04-15

## 2025-04-15 RX ORDER — FUROSEMIDE 20 MG/1
TABLET ORAL
Qty: 180 TABLET | Refills: 2 | Status: SHIPPED | OUTPATIENT
Start: 2025-04-15

## 2025-04-15 RX ORDER — LISINOPRIL AND HYDROCHLOROTHIAZIDE 12.5; 2 MG/1; MG/1
1 TABLET ORAL DAILY
Qty: 90 TABLET | Refills: 2 | Status: SHIPPED | OUTPATIENT
Start: 2025-04-15

## 2025-04-15 RX ORDER — POLYETHYLENE GLYCOL 3350 17 G/17G
POWDER, FOR SOLUTION ORAL
Qty: 510 G | Refills: 5 | Status: SHIPPED | OUTPATIENT
Start: 2025-04-15

## 2025-04-15 RX ORDER — LEVOTHYROXINE SODIUM 88 UG/1
88 TABLET ORAL
Qty: 90 TABLET | Refills: 1 | Status: SHIPPED | OUTPATIENT
Start: 2025-04-15

## 2025-04-15 RX ORDER — MONTELUKAST SODIUM 10 MG/1
TABLET ORAL
Qty: 90 TABLET | Refills: 2 | Status: SHIPPED | OUTPATIENT
Start: 2025-04-15

## 2025-04-15 RX ORDER — ERGOCALCIFEROL 1.25 MG/1
CAPSULE, LIQUID FILLED ORAL
Qty: 12 CAPSULE | Refills: 3 | Status: SHIPPED | OUTPATIENT
Start: 2025-04-15

## 2025-04-15 ASSESSMENT — ENCOUNTER SYMPTOMS
BLURRED VISION: 0
ORTHOPNEA: 0
BOWEL INCONTINENCE: 0
BACK PAIN: 1
SHORTNESS OF BREATH: 0
VISUAL CHANGE: 0
ABDOMINAL PAIN: 1

## 2025-04-15 NOTE — PROGRESS NOTES
HISTORY OF PRESENT ILLNESS  Chief Complaint   Patient presents with    6 Month Follow-Up     HTN, diabetes, hypothyroidism,     Follow-up     Diarrhea-saw Ant, and in afib-per cardio     NOTICE FOR THE PATIENT: This clinical note is not designed to be interpreted by patients.  We do not recommend reading it unless you have medical training. These notes may contain candid and (unintentionally) offensive descriptions, which are sometimes required for accurate documentation. If you would like more information about your healthcare, please obtain it directly by myself or my staff/colleagues - never solely from the notes. Thank you for your understanding and cooperation.       6 Month Follow-Up - HTN, diabetes, hypothyroidism,   Follow-up - Diarrhea-saw Ant, and in afib-per cardio    Started 2/28/25    Off of the ozempic right now.      Got her last injection in the knee.    Previously said, \" 03/26/25  She underwent a cholecystectomy in 11/2024, after which normal bowel movements were experienced until 02/28/2025, when diarrhea began. Initially attributing this to a viral infection, she has since been unable to have a complete bowel movement, often passing only gas or small amounts of loose stool. She reports no solid stools since the onset of her symptoms. Her last colonoscopy was 5 years ago. No current abdominal pain, hematochezia, melena, fever, or chills are reported. Diet monitoring is ongoing. She has not had any recent medication changes. No recent antibiotics. Probiotics are not taken, and over-the-counter medications such as Imodium have not been used for symptoms. She notes increased frequency of loose stools during the day. Approximately 6 bowel movements occurred yesterday, which were not voluminous.     Mild nausea occurs daily but she denies vomiting.     An epidermal inclusion cyst that was previously drained by Dr. Alvarez has since recurred. The cyst does not drain but becomes inflamed.

## 2025-04-16 ENCOUNTER — TELEPHONE (OUTPATIENT)
Dept: FAMILY MEDICINE CLINIC | Facility: CLINIC | Age: 75
End: 2025-04-16

## 2025-04-16 ENCOUNTER — RESULTS FOLLOW-UP (OUTPATIENT)
Dept: FAMILY MEDICINE CLINIC | Facility: CLINIC | Age: 75
End: 2025-04-16

## 2025-04-17 NOTE — RESULT ENCOUNTER NOTE
Your urine test does not show any elevation of microscopic protein in your urine.   We test for that because it can be an early sign of kidney damage from high blood pressure or diabetes.  Recheck that test in 1 year.

## 2025-04-28 ENCOUNTER — PATIENT MESSAGE (OUTPATIENT)
Dept: FAMILY MEDICINE CLINIC | Facility: CLINIC | Age: 75
End: 2025-04-28

## 2025-04-28 DIAGNOSIS — K52.9 CHRONIC DIARRHEA: ICD-10-CM

## 2025-04-28 DIAGNOSIS — R10.84 GENERALIZED ABDOMINAL PAIN: Primary | ICD-10-CM

## 2025-04-28 DIAGNOSIS — R10.31 RIGHT LOWER QUADRANT ABDOMINAL PAIN: ICD-10-CM

## 2025-04-30 NOTE — TELEPHONE ENCOUNTER
Orders Placed This Encounter   Procedures    [facility], [place]     Referral Priority:   Routine     Referral Type:   Eval and Treat     Referral Reason:   Specialty Services Required     Requested Specialty:   Gastroenterology     Number of Visits Requested:   1

## 2025-05-13 ENCOUNTER — TELEPHONE (OUTPATIENT)
Dept: FAMILY MEDICINE CLINIC | Facility: CLINIC | Age: 75
End: 2025-05-13

## 2025-05-13 DIAGNOSIS — Z12.31 ENCOUNTER FOR SCREENING MAMMOGRAM FOR BREAST CANCER: ICD-10-CM

## 2025-06-06 ENCOUNTER — PATIENT MESSAGE (OUTPATIENT)
Age: 75
End: 2025-06-06

## 2025-06-12 NOTE — PROGRESS NOTES
GASTROENTEROLOGY CLINIC VISIT    CC: diarrhea    HPI  Lisbeth Reyes is a 75 y.o. year old female, new patient, who presents to the clinic today for evaluation of diarrhea. PMH pertinent for cholecystectomy (11/2024), GERD. Pt was referred by PCP. Referral note reviewed. Pt initially saw PCP 3/26 after about 3 wks of diarrhea. At the time, states she was having about 8-10 BMs/day. She states it was suspected to be a viral illness at that time. Stool culture, O&P, C diff negative. She followed up with PCP 4/15. She states BMs were less frequent, now about 3-4 BMs/day and more of an \"oatmeal\" instead of watery consistency. XR showed mild retention of stool. She states she was instructed to use miralax for \"mini bowel cleanse\". She states she noticed no improvement after this. Denies fever, wt loss, abdominal pain, melena, hematochezia.     PMHx/PSHx  PMHx: GERD, HTN, HLD, anxiety, KAYLAH, hypothyroidism, afib on Eliquis, CHF  PSHx: cholecystectomy, hysterectomy     Family History  Denies FMH gastric or colorectal cancer.   Denies FMH autoimmune disease.     Social History  Smoking history: former  Alcohol use: no    ROS  Review of Systems   Constitutional:  Negative for activity change, appetite change, chills, fatigue, fever and unexpected weight change.   Gastrointestinal:  Positive for diarrhea. Negative for abdominal distention, abdominal pain, anal bleeding, blood in stool, constipation, nausea and vomiting.       Vitals:    06/13/25 1040   BP: (!) 142/62   Pulse: (!) 48   SpO2: 95%       Physical Exam  Vitals and nursing note reviewed.   Constitutional:       General: She is not in acute distress.     Appearance: Normal appearance. She is not ill-appearing, toxic-appearing or diaphoretic.   HENT:      Head: Normocephalic and atraumatic.   Eyes:      General: No scleral icterus.     Conjunctiva/sclera: Conjunctivae normal.   Cardiovascular:      Rate and Rhythm: Normal rate.   Pulmonary:      Effort:

## 2025-06-13 ENCOUNTER — OFFICE VISIT (OUTPATIENT)
Dept: GASTROENTEROLOGY | Age: 75
End: 2025-06-13
Payer: MEDICARE

## 2025-06-13 VITALS
BODY MASS INDEX: 38.98 KG/M2 | SYSTOLIC BLOOD PRESSURE: 142 MMHG | WEIGHT: 220 LBS | HEIGHT: 63 IN | OXYGEN SATURATION: 95 % | HEART RATE: 48 BPM | DIASTOLIC BLOOD PRESSURE: 62 MMHG

## 2025-06-13 DIAGNOSIS — R19.7 DIARRHEA, UNSPECIFIED TYPE: Primary | ICD-10-CM

## 2025-06-13 PROCEDURE — 1123F ACP DISCUSS/DSCN MKR DOCD: CPT | Performed by: PHYSICIAN ASSISTANT

## 2025-06-13 PROCEDURE — 99204 OFFICE O/P NEW MOD 45 MIN: CPT | Performed by: PHYSICIAN ASSISTANT

## 2025-06-13 PROCEDURE — 3078F DIAST BP <80 MM HG: CPT | Performed by: PHYSICIAN ASSISTANT

## 2025-06-13 PROCEDURE — 1160F RVW MEDS BY RX/DR IN RCRD: CPT | Performed by: PHYSICIAN ASSISTANT

## 2025-06-13 PROCEDURE — 1159F MED LIST DOCD IN RCRD: CPT | Performed by: PHYSICIAN ASSISTANT

## 2025-06-13 PROCEDURE — 3077F SYST BP >= 140 MM HG: CPT | Performed by: PHYSICIAN ASSISTANT

## 2025-06-13 RX ORDER — COLESTIPOL HYDROCHLORIDE 1 G/1
1 TABLET ORAL 2 TIMES DAILY
Qty: 180 TABLET | Refills: 0 | Status: SHIPPED | OUTPATIENT
Start: 2025-06-13 | End: 2025-09-11

## 2025-06-13 ASSESSMENT — ENCOUNTER SYMPTOMS
ABDOMINAL PAIN: 0
CONSTIPATION: 0
DIARRHEA: 1
NAUSEA: 0
ANAL BLEEDING: 0
BLOOD IN STOOL: 0
VOMITING: 0
ABDOMINAL DISTENTION: 0

## 2025-06-24 ENCOUNTER — OFFICE VISIT (OUTPATIENT)
Dept: ORTHOPEDIC SURGERY | Age: 75
End: 2025-06-24
Payer: MEDICARE

## 2025-06-24 DIAGNOSIS — M17.11 OSTEOARTHRITIS OF RIGHT KNEE, UNSPECIFIED OSTEOARTHRITIS TYPE: ICD-10-CM

## 2025-06-24 DIAGNOSIS — M25.561 RIGHT KNEE PAIN, UNSPECIFIED CHRONICITY: Primary | ICD-10-CM

## 2025-06-24 PROCEDURE — 1123F ACP DISCUSS/DSCN MKR DOCD: CPT | Performed by: PHYSICIAN ASSISTANT

## 2025-06-24 PROCEDURE — 99214 OFFICE O/P EST MOD 30 MIN: CPT | Performed by: PHYSICIAN ASSISTANT

## 2025-06-24 PROCEDURE — 20611 DRAIN/INJ JOINT/BURSA W/US: CPT | Performed by: PHYSICIAN ASSISTANT

## 2025-06-24 RX ORDER — TRIAMCINOLONE ACETONIDE 40 MG/ML
40 INJECTION, SUSPENSION INTRA-ARTICULAR; INTRAMUSCULAR ONCE
Status: COMPLETED | OUTPATIENT
Start: 2025-06-24 | End: 2025-06-24

## 2025-06-24 RX ADMIN — TRIAMCINOLONE ACETONIDE 40 MG: 40 INJECTION, SUSPENSION INTRA-ARTICULAR; INTRAMUSCULAR at 13:44

## 2025-06-24 NOTE — PROGRESS NOTES
the right knee are reviewed.  4 views standing reveal some joint space loss, eburnated bone, and osteophyte formation present.      X-ray impression:  Moderate osteoarthritis of the right knee.    IMPRESSION:    Diagnosis Orders   1. Right knee pain, unspecified chronicity  XR KNEE RIGHT (MIN 4 VIEWS)      2. Osteoarthritis of right knee, unspecified osteoarthritis type        .    RECOMMENDATIONS:    Reviewed x-ray findings with the patient.  The concerns with functional limitations are increasing and response is variable with conservative measures. Surgery was discussed today with the patient.  She is not limited enough to warrant any type of surgical consideration.  We will additionally try injection therapies as we process management of this progressive and chronic condition.    Procedure Note: Time out was performed which included identifying the patient by name and date of birth.  The procedure site was identified with all present in agreement.   The right knee was prepped with alcohol and injected with 40 mg of Kenalog and 2 mL of 0.5 % marcaine.  NorSun US unit with a variable frequency (6.0-15.0 MHz) linear transducer was used to visualize the retropatellar fat pad, patella, patellar tendon, tibia, and to ensure proper intra-articular placement of medication.  Injection image was stored in the patient's permanent chart.  The injection was without event and I will follow them as scheduled.  We discussed starting HP at follow-up if cortisone does not provide adequate pain relief.    Approximately 30 minutes was spent reviewing the medical record, imaging, performing history and physical examination, discussing the diagnosis and treatment plan with the patient, and completing documentation for this visit.

## 2025-07-28 ENCOUNTER — OFFICE VISIT (OUTPATIENT)
Dept: ORTHOPEDIC SURGERY | Age: 75
End: 2025-07-28
Payer: MEDICARE

## 2025-07-28 DIAGNOSIS — M17.11 OSTEOARTHRITIS OF RIGHT KNEE, UNSPECIFIED OSTEOARTHRITIS TYPE: Primary | ICD-10-CM

## 2025-07-28 PROCEDURE — 20611 DRAIN/INJ JOINT/BURSA W/US: CPT | Performed by: PHYSICIAN ASSISTANT

## 2025-07-28 NOTE — PROGRESS NOTES
Name: Lisbeth Reyes  YOB: 1950  Gender: female  MRN: [id number]      CC: Right Knee Pain     PROCEDURE: 1 of 3     DIAGNOSIS:    Encounter Diagnosis   Name Primary?    Osteoarthritis of right knee, unspecified osteoarthritis type Yes       Cinepapaya US unit with variable frequency (6.0-15.0 MHz) linear transducer was used visualize the intracondylar notch, retropatellar fat pad, patella tendon, patella, tibia, and to ensure proper intra-articular needle placement.  Injection image was saved to patient's permanent chart.    Procedure Note: Time out was performed which included identifying the patient by name and date of birth.  The procedure site was identified with all present in agreement.   The patient was placed in upright position with knee hanging freely from exam table.  The right  knee was prepped in sterile fashion using alcohol wipe.  Using Mindray ultrasound guidance, a 22 gauge needle was then introduced into the knee joint from an infrapatellar approach and  2 mL of Euflexxa was injected freely.  The needle was then removed, pressure hemostasis achieved, injection site was cleansed with alcohol wipe and dressed with band aid.    The patient tolerated the procedure without complication.  The patient  will follow up as scheduled.

## 2025-08-04 ENCOUNTER — OFFICE VISIT (OUTPATIENT)
Dept: ORTHOPEDIC SURGERY | Age: 75
End: 2025-08-04
Payer: MEDICARE

## 2025-08-04 ENCOUNTER — OFFICE VISIT (OUTPATIENT)
Dept: GASTROENTEROLOGY | Age: 75
End: 2025-08-04
Payer: MEDICARE

## 2025-08-04 VITALS
BODY MASS INDEX: 37.21 KG/M2 | WEIGHT: 210 LBS | OXYGEN SATURATION: 94 % | HEART RATE: 55 BPM | SYSTOLIC BLOOD PRESSURE: 138 MMHG | HEIGHT: 63 IN | DIASTOLIC BLOOD PRESSURE: 57 MMHG

## 2025-08-04 DIAGNOSIS — R19.7 DIARRHEA, UNSPECIFIED TYPE: Primary | ICD-10-CM

## 2025-08-04 DIAGNOSIS — M17.11 OSTEOARTHRITIS OF RIGHT KNEE, UNSPECIFIED OSTEOARTHRITIS TYPE: Primary | ICD-10-CM

## 2025-08-04 DIAGNOSIS — R10.84 GENERALIZED ABDOMINAL PAIN: ICD-10-CM

## 2025-08-04 PROCEDURE — 99214 OFFICE O/P EST MOD 30 MIN: CPT | Performed by: PHYSICIAN ASSISTANT

## 2025-08-04 PROCEDURE — 1160F RVW MEDS BY RX/DR IN RCRD: CPT | Performed by: PHYSICIAN ASSISTANT

## 2025-08-04 PROCEDURE — 3075F SYST BP GE 130 - 139MM HG: CPT | Performed by: PHYSICIAN ASSISTANT

## 2025-08-04 PROCEDURE — 3078F DIAST BP <80 MM HG: CPT | Performed by: PHYSICIAN ASSISTANT

## 2025-08-04 PROCEDURE — 1123F ACP DISCUSS/DSCN MKR DOCD: CPT | Performed by: PHYSICIAN ASSISTANT

## 2025-08-04 PROCEDURE — 20611 DRAIN/INJ JOINT/BURSA W/US: CPT | Performed by: ORTHOPAEDIC SURGERY

## 2025-08-04 PROCEDURE — 1159F MED LIST DOCD IN RCRD: CPT | Performed by: PHYSICIAN ASSISTANT

## 2025-08-04 ASSESSMENT — ENCOUNTER SYMPTOMS
NAUSEA: 0
DIARRHEA: 0
VOMITING: 0
ANAL BLEEDING: 0
ABDOMINAL PAIN: 1
ABDOMINAL DISTENTION: 0
BLOOD IN STOOL: 0
CONSTIPATION: 0

## 2025-08-18 ENCOUNTER — OFFICE VISIT (OUTPATIENT)
Dept: ORTHOPEDIC SURGERY | Age: 75
End: 2025-08-18
Payer: MEDICARE

## 2025-08-18 DIAGNOSIS — M17.11 OSTEOARTHRITIS OF RIGHT KNEE, UNSPECIFIED OSTEOARTHRITIS TYPE: Primary | ICD-10-CM

## 2025-08-18 PROCEDURE — 20611 DRAIN/INJ JOINT/BURSA W/US: CPT | Performed by: PHYSICIAN ASSISTANT

## (undated) DEVICE — STRIP,CLOSURE,WOUND,MEDI-STRIP,1/2X4: Brand: MEDLINE

## (undated) DEVICE — APPLIER CLP M/L SHFT DIA5MM 15 LIG LIGAMAX 5

## (undated) DEVICE — MASTISOL ADHESIVE LIQ 2/3ML

## (undated) DEVICE — TUBING INSUFFLATION SMK EVAC HI FLO SET PNEUMOCLEAR

## (undated) DEVICE — GENERAL LAPAROSCOPY: Brand: MEDLINE INDUSTRIES, INC.

## (undated) DEVICE — TROCAR: Brand: KII® SLEEVE

## (undated) DEVICE — Z DISCONTINUED USE 2889526 SHEARS ENDOSCP L36CM DIA5MM ULTRASONIC CRV TIP W/ ADV

## (undated) DEVICE — INTENDED FOR TISSUE SEPARATION, AND OTHER PROCEDURES THAT REQUIRE A SHARP SURGICAL BLADE TO PUNCTURE OR CUT.: Brand: BARD-PARKER ® STAINLESS STEEL BLADES

## (undated) DEVICE — TROCAR: Brand: KII FIOS FIRST ENTRY

## (undated) DEVICE — LIQUIBAND RAPID ADHESIVE 36/CS 0.8ML: Brand: MEDLINE

## (undated) DEVICE — SUTURE VICRYL + SZ 0 L27IN ABSRB VLT L26MM UR-6 5/8 CIR VCP603H

## (undated) DEVICE — SUTURE MONOCRYL SZ 4-0 L18IN ABSRB UD L19MM PS-2 3/8 CIR PRIM Y496G

## (undated) DEVICE — SUTURE VICRYL ENDOLOOP SZ 0 L18IN ABSRB VLT LIG SLDE KNOT

## (undated) DEVICE — TROCARS: Brand: KII® BLUNT TIP ACCESS SYSTEM

## (undated) DEVICE — GLOVE SURG SZ 75 CRM LTX FREE POLYISOPRENE POLYMER BEAD ANTI